# Patient Record
Sex: MALE | Race: WHITE | NOT HISPANIC OR LATINO | Employment: OTHER | URBAN - METROPOLITAN AREA
[De-identification: names, ages, dates, MRNs, and addresses within clinical notes are randomized per-mention and may not be internally consistent; named-entity substitution may affect disease eponyms.]

---

## 2017-01-01 ENCOUNTER — APPOINTMENT (INPATIENT)
Dept: RADIOLOGY | Facility: HOSPITAL | Age: 82
DRG: 673 | End: 2017-01-01
Payer: MEDICARE

## 2017-01-01 ENCOUNTER — ALLSCRIPTS OFFICE VISIT (OUTPATIENT)
Dept: OTHER | Facility: OTHER | Age: 82
End: 2017-01-01

## 2017-01-01 ENCOUNTER — APPOINTMENT (INPATIENT)
Dept: DIALYSIS | Facility: HOSPITAL | Age: 82
DRG: 673 | End: 2017-01-01
Attending: INTERNAL MEDICINE
Payer: MEDICARE

## 2017-01-01 ENCOUNTER — GENERIC CONVERSION - ENCOUNTER (OUTPATIENT)
Dept: OTHER | Facility: OTHER | Age: 82
End: 2017-01-01

## 2017-01-01 ENCOUNTER — LAB REQUISITION (OUTPATIENT)
Dept: LAB | Facility: HOSPITAL | Age: 82
End: 2017-01-01
Payer: MEDICARE

## 2017-01-01 ENCOUNTER — APPOINTMENT (INPATIENT)
Dept: RADIOLOGY | Facility: HOSPITAL | Age: 82
DRG: 673 | End: 2017-01-01
Attending: INTERNAL MEDICINE
Payer: MEDICARE

## 2017-01-01 ENCOUNTER — HOSPITAL ENCOUNTER (INPATIENT)
Facility: HOSPITAL | Age: 82
LOS: 13 days | Discharge: NURSING, REHAB OR LONG-TERM CARE | DRG: 673 | End: 2018-01-08
Attending: EMERGENCY MEDICINE | Admitting: INTERNAL MEDICINE
Payer: MEDICARE

## 2017-01-01 ENCOUNTER — LAB CONVERSION - ENCOUNTER (OUTPATIENT)
Dept: OTHER | Facility: OTHER | Age: 82
End: 2017-01-01

## 2017-01-01 ENCOUNTER — HOSPITAL ENCOUNTER (OUTPATIENT)
Dept: RADIOLOGY | Facility: MEDICAL CENTER | Age: 82
Discharge: HOME | End: 2017-01-31
Payer: MEDICARE

## 2017-01-01 ENCOUNTER — APPOINTMENT (INPATIENT)
Dept: DIALYSIS | Facility: HOSPITAL | Age: 82
DRG: 673 | End: 2017-01-01
Payer: MEDICARE

## 2017-01-01 DIAGNOSIS — E87.5 HYPERKALEMIA: ICD-10-CM

## 2017-01-01 DIAGNOSIS — N18.9 CHRONIC KIDNEY DISEASE: ICD-10-CM

## 2017-01-01 DIAGNOSIS — N18.4 CKD (CHRONIC KIDNEY DISEASE) STAGE 4, GFR 15-29 ML/MIN (HCC): ICD-10-CM

## 2017-01-01 DIAGNOSIS — M79.672 PAIN OF LEFT FOOT: ICD-10-CM

## 2017-01-01 DIAGNOSIS — L03.119 CELLULITIS OF EXTREMITY: ICD-10-CM

## 2017-01-01 DIAGNOSIS — L98.9 DISORDER OF SKIN OR SUBCUTANEOUS TISSUE: ICD-10-CM

## 2017-01-01 DIAGNOSIS — N17.9 ACUTE RENAL FAILURE (ARF) (HCC): ICD-10-CM

## 2017-01-01 DIAGNOSIS — N17.9 AKI (ACUTE KIDNEY INJURY) (HCC): ICD-10-CM

## 2017-01-01 DIAGNOSIS — T50.4X5A: Primary | ICD-10-CM

## 2017-01-01 DIAGNOSIS — N17.9 ACUTE KIDNEY INJURY SUPERIMPOSED ON CHRONIC KIDNEY DISEASE (HCC): ICD-10-CM

## 2017-01-01 DIAGNOSIS — N18.9 ACUTE KIDNEY INJURY SUPERIMPOSED ON CHRONIC KIDNEY DISEASE (HCC): ICD-10-CM

## 2017-01-01 LAB
ALBUMIN SERPL BCP-MCNC: 2.9 G/DL (ref 3.5–5)
ALBUMIN SERPL BCP-MCNC: 3 G/DL (ref 3.5–5)
ALBUMIN SERPL BCP-MCNC: 3.1 G/DL (ref 3.5–5)
ALBUMIN SERPL BCP-MCNC: 3.2 G/DL (ref 3.5–5)
ALBUMIN SERPL BCP-MCNC: 3.2 G/DL (ref 3.5–5)
ALBUMIN SERPL BCP-MCNC: 3.6 G/DL (ref 3.5–5)
ALBUMIN SERPL BCP-MCNC: 3.6 G/DL (ref 3.5–5)
ALP SERPL-CCNC: 112 U/L (ref 46–116)
ALP SERPL-CCNC: 68 U/L (ref 46–116)
ALP SERPL-CCNC: 89 U/L (ref 46–116)
ALP SERPL-CCNC: 91 U/L (ref 46–116)
ALP SERPL-CCNC: 91 U/L (ref 46–116)
ALT SERPL W P-5'-P-CCNC: 18 U/L (ref 12–78)
ALT SERPL W P-5'-P-CCNC: 352 U/L (ref 12–78)
ALT SERPL W P-5'-P-CCNC: 55 U/L (ref 12–78)
ALT SERPL W P-5'-P-CCNC: 56 U/L (ref 12–78)
ALT SERPL W P-5'-P-CCNC: 56 U/L (ref 12–78)
ANION GAP SERPL CALCULATED.3IONS-SCNC: 10 MMOL/L (ref 4–13)
ANION GAP SERPL CALCULATED.3IONS-SCNC: 12 MMOL/L (ref 4–13)
ANION GAP SERPL CALCULATED.3IONS-SCNC: 12 MMOL/L (ref 4–13)
ANION GAP SERPL CALCULATED.3IONS-SCNC: 13 MMOL/L (ref 4–13)
ANION GAP SERPL CALCULATED.3IONS-SCNC: 13 MMOL/L (ref 4–13)
ANION GAP SERPL CALCULATED.3IONS-SCNC: 15 MMOL/L (ref 4–13)
ANION GAP SERPL CALCULATED.3IONS-SCNC: 6 MMOL/L (ref 4–13)
ANION GAP SERPL CALCULATED.3IONS-SCNC: 9 MMOL/L (ref 4–13)
APTT PPP: 27 SECONDS (ref 23–35)
AST SERPL W P-5'-P-CCNC: 11 U/L (ref 5–45)
AST SERPL W P-5'-P-CCNC: 180 U/L (ref 5–45)
AST SERPL W P-5'-P-CCNC: 38 U/L (ref 5–45)
ATRIAL RATE: 60 BPM
BACTERIA UR QL AUTO: ABNORMAL /HPF
BASOPHILS # BLD AUTO: 0.03 THOUSANDS/ΜL (ref 0–0.1)
BASOPHILS # BLD AUTO: 0.04 THOUSANDS/ΜL (ref 0–0.1)
BASOPHILS # BLD AUTO: 0.9 %
BASOPHILS # BLD AUTO: 59 CELLS/UL (ref 0–200)
BASOPHILS NFR BLD AUTO: 0 % (ref 0–1)
BASOPHILS NFR BLD AUTO: 1 % (ref 0–1)
BILIRUB DIRECT SERPL-MCNC: 0.42 MG/DL (ref 0–0.2)
BILIRUB SERPL-MCNC: 0.31 MG/DL (ref 0.2–1)
BILIRUB SERPL-MCNC: 0.34 MG/DL (ref 0.2–1)
BILIRUB SERPL-MCNC: 0.36 MG/DL (ref 0.2–1)
BILIRUB SERPL-MCNC: 0.57 MG/DL (ref 0.2–1)
BILIRUB SERPL-MCNC: 0.82 MG/DL (ref 0.2–1)
BILIRUB UR QL STRIP: ABNORMAL
BUN SERPL-MCNC: 100 MG/DL (ref 5–25)
BUN SERPL-MCNC: 109 MG/DL (ref 5–25)
BUN SERPL-MCNC: 56 MG/DL (ref 7–25)
BUN SERPL-MCNC: 57 MG/DL (ref 7–25)
BUN SERPL-MCNC: 58 MG/DL (ref 7–25)
BUN SERPL-MCNC: 63 MG/DL (ref 5–25)
BUN SERPL-MCNC: 63 MG/DL (ref 7–25)
BUN SERPL-MCNC: 64 MG/DL (ref 5–25)
BUN SERPL-MCNC: 80 MG/DL (ref 5–25)
BUN SERPL-MCNC: 84 MG/DL (ref 5–25)
BUN SERPL-MCNC: 89 MG/DL (ref 5–25)
BUN SERPL-MCNC: 90 MG/DL (ref 5–25)
BUN/CREA RATIO (HISTORICAL): 19 (CALC) (ref 6–22)
BUN/CREA RATIO (HISTORICAL): 21 (CALC) (ref 6–22)
BUN/CREA RATIO (HISTORICAL): 22 (CALC) (ref 6–22)
BUN/CREA RATIO (HISTORICAL): 23 (CALC) (ref 6–22)
CALCIUM SERPL-MCNC: 8.1 MG/DL (ref 8.3–10.1)
CALCIUM SERPL-MCNC: 8.2 MG/DL (ref 8.3–10.1)
CALCIUM SERPL-MCNC: 8.3 MG/DL (ref 8.3–10.1)
CALCIUM SERPL-MCNC: 8.4 MG/DL (ref 8.3–10.1)
CALCIUM SERPL-MCNC: 8.4 MG/DL (ref 8.3–10.1)
CALCIUM SERPL-MCNC: 8.5 MG/DL (ref 8.3–10.1)
CALCIUM SERPL-MCNC: 8.9 MG/DL (ref 8.6–10.3)
CALCIUM SERPL-MCNC: 9.1 MG/DL (ref 8.6–10.3)
CALCIUM SERPL-MCNC: 9.1 MG/DL (ref 8.6–10.3)
CALCIUM SERPL-MCNC: 9.2 MG/DL (ref 8.3–10.1)
CALCIUM SERPL-MCNC: 9.3 MG/DL (ref 8.6–10.3)
CALCIUM SERPL-MCNC: 9.4 MG/DL (ref 8.3–10.1)
CHLORIDE SERPL-SCNC: 100 MMOL/L (ref 100–108)
CHLORIDE SERPL-SCNC: 100 MMOL/L (ref 100–108)
CHLORIDE SERPL-SCNC: 101 MMOL/L (ref 100–108)
CHLORIDE SERPL-SCNC: 101 MMOL/L (ref 100–108)
CHLORIDE SERPL-SCNC: 101 MMOL/L (ref 98–110)
CHLORIDE SERPL-SCNC: 102 MMOL/L (ref 100–108)
CHLORIDE SERPL-SCNC: 102 MMOL/L (ref 98–110)
CHLORIDE SERPL-SCNC: 102 MMOL/L (ref 98–110)
CHLORIDE SERPL-SCNC: 104 MMOL/L (ref 98–110)
CHLORIDE SERPL-SCNC: 99 MMOL/L (ref 100–108)
CK SERPL-CCNC: 103 U/L (ref 39–308)
CLARITY UR: CLEAR
CO2 SERPL-SCNC: 22 MMOL/L (ref 21–32)
CO2 SERPL-SCNC: 22 MMOL/L (ref 21–32)
CO2 SERPL-SCNC: 24 MMOL/L (ref 21–32)
CO2 SERPL-SCNC: 24 MMOL/L (ref 21–32)
CO2 SERPL-SCNC: 26 MMOL/L (ref 21–32)
CO2 SERPL-SCNC: 27 MMOL/L (ref 21–32)
CO2 SERPL-SCNC: 28 MMOL/L (ref 21–32)
CO2 SERPL-SCNC: 30 MMOL/L (ref 20–31)
CO2 SERPL-SCNC: 31 MMOL/L (ref 20–31)
CO2 SERPL-SCNC: 32 MMOL/L (ref 20–31)
CO2 SERPL-SCNC: 33 MMOL/L (ref 20–31)
CO2 SERPL-SCNC: 36 MMOL/L (ref 21–32)
COLOR UR: YELLOW
CREAT SERPL-MCNC: 2.49 MG/DL (ref 0.7–1.11)
CREAT SERPL-MCNC: 2.67 MG/DL (ref 0.6–1.3)
CREAT SERPL-MCNC: 2.8 MG/DL (ref 0.7–1.11)
CREAT SERPL-MCNC: 2.9 MG/DL (ref 0.7–1.11)
CREAT SERPL-MCNC: 2.96 MG/DL (ref 0.7–1.11)
CREAT SERPL-MCNC: 4.03 MG/DL (ref 0.6–1.3)
CREAT SERPL-MCNC: 4.37 MG/DL (ref 0.6–1.3)
CREAT SERPL-MCNC: 4.42 MG/DL (ref 0.6–1.3)
CREAT SERPL-MCNC: 4.43 MG/DL (ref 0.6–1.3)
CREAT SERPL-MCNC: 4.5 MG/DL (ref 0.6–1.3)
CREAT SERPL-MCNC: 4.7 MG/DL (ref 0.6–1.3)
CREAT SERPL-MCNC: 5.33 MG/DL (ref 0.6–1.3)
CREAT UR-MCNC: 287 MG/DL
DEPRECATED RDW RBC AUTO: 12.4 % (ref 11–15)
EGFR AFRICAN AMERICAN (HISTORICAL): 21 ML/MIN/1.73M2
EGFR AFRICAN AMERICAN (HISTORICAL): 21 ML/MIN/1.73M2
EGFR AFRICAN AMERICAN (HISTORICAL): 22 ML/MIN/1.73M2
EGFR AFRICAN AMERICAN (HISTORICAL): 26 ML/MIN/1.73M2
EGFR-AMERICAN CALC (HISTORICAL): 18 ML/MIN/1.73M2
EGFR-AMERICAN CALC (HISTORICAL): 18 ML/MIN/1.73M2
EGFR-AMERICAN CALC (HISTORICAL): 19 ML/MIN/1.73M2
EGFR-AMERICAN CALC (HISTORICAL): 22 ML/MIN/1.73M2
EOSINOPHIL # BLD AUTO: 0.06 THOUSAND/ΜL (ref 0–0.61)
EOSINOPHIL # BLD AUTO: 0.15 THOUSAND/ΜL (ref 0–0.61)
EOSINOPHIL # BLD AUTO: 1.2 %
EOSINOPHIL # BLD AUTO: 78 CELLS/UL (ref 15–500)
EOSINOPHIL NFR BLD AUTO: 1 % (ref 0–6)
EOSINOPHIL NFR BLD AUTO: 2 % (ref 0–6)
EOSINOPHIL NFR URNS MANUAL: 0 %
ERYTHROCYTE [DISTWIDTH] IN BLOOD BY AUTOMATED COUNT: 13.6 % (ref 11.6–15.1)
ERYTHROCYTE [DISTWIDTH] IN BLOOD BY AUTOMATED COUNT: 14.3 % (ref 11.6–15.1)
ERYTHROCYTE [DISTWIDTH] IN BLOOD BY AUTOMATED COUNT: 14.4 % (ref 11.6–15.1)
ERYTHROCYTE [DISTWIDTH] IN BLOOD BY AUTOMATED COUNT: 14.4 % (ref 11.6–15.1)
ERYTHROCYTE [DISTWIDTH] IN BLOOD BY AUTOMATED COUNT: 14.5 % (ref 11.6–15.1)
ERYTHROCYTE [DISTWIDTH] IN BLOOD BY AUTOMATED COUNT: 14.6 % (ref 11.6–15.1)
ERYTHROCYTE [DISTWIDTH] IN BLOOD BY AUTOMATED COUNT: 14.7 % (ref 11.6–15.1)
ERYTHROCYTE [SEDIMENTATION RATE] IN BLOOD: 11 MM/HOUR (ref 0–10)
FERRITIN SERPL-MCNC: 116 NG/ML (ref 8–388)
FOLATE SERPL-MCNC: >20 NG/ML (ref 3.1–17.5)
GFR SERPL CREATININE-BSD FRML MDRD: 10 ML/MIN/1.73SQ M
GFR SERPL CREATININE-BSD FRML MDRD: 11 ML/MIN/1.73SQ M
GFR SERPL CREATININE-BSD FRML MDRD: 12 ML/MIN/1.73SQ M
GFR SERPL CREATININE-BSD FRML MDRD: 22.8 ML/MIN/1.73SQ M
GFR SERPL CREATININE-BSD FRML MDRD: 9 ML/MIN/1.73SQ M
GLUCOSE (HISTORICAL): 113 MG/DL (ref 65–99)
GLUCOSE (HISTORICAL): 120 MG/DL (ref 65–99)
GLUCOSE (HISTORICAL): 125 MG/DL (ref 65–99)
GLUCOSE (HISTORICAL): 97 MG/DL (ref 65–99)
GLUCOSE SERPL-MCNC: 100 MG/DL (ref 65–140)
GLUCOSE SERPL-MCNC: 101 MG/DL (ref 65–140)
GLUCOSE SERPL-MCNC: 110 MG/DL (ref 65–140)
GLUCOSE SERPL-MCNC: 115 MG/DL (ref 65–140)
GLUCOSE SERPL-MCNC: 142 MG/DL (ref 65–140)
GLUCOSE SERPL-MCNC: 145 MG/DL (ref 65–140)
GLUCOSE SERPL-MCNC: 152 MG/DL (ref 65–140)
GLUCOSE SERPL-MCNC: 96 MG/DL (ref 65–140)
GLUCOSE UR STRIP-MCNC: NEGATIVE MG/DL
HBV CORE AB SER QL: NORMAL
HBV CORE IGM SER QL: NORMAL
HBV SURFACE AB SER-ACNC: <3.1 MIU/ML
HBV SURFACE AG SER QL: NORMAL
HCT VFR BLD AUTO: 31.7 % (ref 36.5–49.3)
HCT VFR BLD AUTO: 33.4 % (ref 36.5–49.3)
HCT VFR BLD AUTO: 34.3 % (ref 36.5–49.3)
HCT VFR BLD AUTO: 35.5 % (ref 36.5–49.3)
HCT VFR BLD AUTO: 35.7 % (ref 36.5–49.3)
HCT VFR BLD AUTO: 37.5 % (ref 36.5–49.3)
HCT VFR BLD AUTO: 37.8 % (ref 38.5–50)
HCT VFR BLD AUTO: 42.8 % (ref 36.5–49.3)
HCV AB SER QL: NORMAL
HEMOCCULT STL QL: POSITIVE
HGB BLD-MCNC: 10.1 G/DL (ref 12–17)
HGB BLD-MCNC: 10.6 G/DL (ref 12–17)
HGB BLD-MCNC: 10.9 G/DL (ref 12–17)
HGB BLD-MCNC: 11.1 G/DL (ref 12–17)
HGB BLD-MCNC: 11.5 G/DL (ref 12–17)
HGB BLD-MCNC: 12 G/DL (ref 12–17)
HGB BLD-MCNC: 12.5 G/DL (ref 13.2–17.1)
HGB BLD-MCNC: 13.5 G/DL (ref 12–17)
HGB UR QL STRIP.AUTO: NEGATIVE
INR PPP: 1.12 (ref 0.86–1.16)
IRON SATN MFR SERPL: 11 %
IRON SERPL-MCNC: 34 UG/DL (ref 65–175)
KETONES UR STRIP-MCNC: NEGATIVE MG/DL
LEUKOCYTE ESTERASE UR QL STRIP: NEGATIVE
LIPASE SERPL-CCNC: 74 U/L (ref 73–393)
LYMPHOCYTES # BLD AUTO: 0.94 THOUSANDS/ΜL (ref 0.6–4.47)
LYMPHOCYTES # BLD AUTO: 1.01 THOUSANDS/ΜL (ref 0.6–4.47)
LYMPHOCYTES # BLD AUTO: 12.7 %
LYMPHOCYTES # BLD AUTO: 826 CELLS/UL (ref 850–3900)
LYMPHOCYTES NFR BLD AUTO: 11 % (ref 14–44)
LYMPHOCYTES NFR BLD AUTO: 12 % (ref 14–44)
MAGNESIUM SERPL-MCNC: 2.9 MG/DL (ref 1.6–2.6)
MCH RBC QN AUTO: 30.4 PG (ref 26.8–34.3)
MCH RBC QN AUTO: 30.5 PG (ref 27–33)
MCH RBC QN AUTO: 30.7 PG (ref 26.8–34.3)
MCH RBC QN AUTO: 30.8 PG (ref 26.8–34.3)
MCH RBC QN AUTO: 31.1 PG (ref 26.8–34.3)
MCH RBC QN AUTO: 31.2 PG (ref 26.8–34.3)
MCHC RBC AUTO-ENTMCNC: 31.3 G/DL (ref 31.4–37.4)
MCHC RBC AUTO-ENTMCNC: 31.5 G/DL (ref 31.4–37.4)
MCHC RBC AUTO-ENTMCNC: 31.7 G/DL (ref 31.4–37.4)
MCHC RBC AUTO-ENTMCNC: 31.8 G/DL (ref 31.4–37.4)
MCHC RBC AUTO-ENTMCNC: 31.9 G/DL (ref 31.4–37.4)
MCHC RBC AUTO-ENTMCNC: 32 G/DL (ref 31.4–37.4)
MCHC RBC AUTO-ENTMCNC: 32.2 G/DL (ref 31.4–37.4)
MCHC RBC AUTO-ENTMCNC: 33.1 G/DL (ref 32–36)
MCV RBC AUTO: 92.2 FL (ref 80–100)
MCV RBC AUTO: 96 FL (ref 82–98)
MCV RBC AUTO: 96 FL (ref 82–98)
MCV RBC AUTO: 97 FL (ref 82–98)
MCV RBC AUTO: 98 FL (ref 82–98)
MCV RBC AUTO: 99 FL (ref 82–98)
MONOCYTES # BLD AUTO: 0.71 THOUSAND/ΜL (ref 0.17–1.22)
MONOCYTES # BLD AUTO: 0.99 THOUSAND/ΜL (ref 0.17–1.22)
MONOCYTES # BLD AUTO: 748 CELLS/UL (ref 200–950)
MONOCYTES (HISTORICAL): 11.5 %
MONOCYTES NFR BLD AUTO: 11 % (ref 4–12)
MONOCYTES NFR BLD AUTO: 9 % (ref 4–12)
NEUTROPHILS # BLD AUTO: 4791 CELLS/UL (ref 1500–7800)
NEUTROPHILS # BLD AUTO: 6.52 THOUSANDS/ΜL (ref 1.85–7.62)
NEUTROPHILS # BLD AUTO: 6.55 THOUSANDS/ΜL (ref 1.85–7.62)
NEUTROPHILS # BLD AUTO: 73.7 %
NEUTS SEG NFR BLD AUTO: 75 % (ref 43–75)
NEUTS SEG NFR BLD AUTO: 78 % (ref 43–75)
NITRITE UR QL STRIP: NEGATIVE
NON-SQ EPI CELLS URNS QL MICRO: ABNORMAL /HPF
NRBC BLD AUTO-RTO: 0 /100 WBCS
NRBC BLD AUTO-RTO: 0 /100 WBCS
P AXIS: -7 DEGREES
PH UR STRIP.AUTO: 5 [PH] (ref 4.5–8)
PHOSPHATE SERPL-MCNC: 5 MG/DL (ref 2.3–4.1)
PHOSPHATE SERPL-MCNC: 6.1 MG/DL (ref 2.3–4.1)
PLATELET # BLD AUTO: 137 THOUSANDS/UL (ref 149–390)
PLATELET # BLD AUTO: 166 THOUSANDS/UL (ref 149–390)
PLATELET # BLD AUTO: 172 THOUSANDS/UL (ref 149–390)
PLATELET # BLD AUTO: 172 THOUSANDS/UL (ref 149–390)
PLATELET # BLD AUTO: 179 THOUSANDS/UL (ref 149–390)
PLATELET # BLD AUTO: 194 THOUSAND/UL (ref 140–400)
PLATELET # BLD AUTO: 199 THOUSANDS/UL (ref 149–390)
PLATELET # BLD AUTO: 244 THOUSANDS/UL (ref 149–390)
PMV BLD AUTO: 11.1 FL (ref 8.9–12.7)
PMV BLD AUTO: 11.5 FL (ref 7.5–12.5)
PMV BLD AUTO: 11.6 FL (ref 8.9–12.7)
PMV BLD AUTO: 11.7 FL (ref 8.9–12.7)
PMV BLD AUTO: 11.7 FL (ref 8.9–12.7)
PMV BLD AUTO: 11.8 FL (ref 8.9–12.7)
PMV BLD AUTO: 12 FL (ref 8.9–12.7)
PMV BLD AUTO: 12.1 FL (ref 8.9–12.7)
POTASSIUM SERPL-SCNC: 4.3 MMOL/L (ref 3.5–5.3)
POTASSIUM SERPL-SCNC: 4.4 MMOL/L (ref 3.5–5.3)
POTASSIUM SERPL-SCNC: 4.7 MMOL/L (ref 3.5–5.3)
POTASSIUM SERPL-SCNC: 4.7 MMOL/L (ref 3.5–5.3)
POTASSIUM SERPL-SCNC: 4.8 MMOL/L (ref 3.5–5.3)
POTASSIUM SERPL-SCNC: 4.8 MMOL/L (ref 3.5–5.3)
POTASSIUM SERPL-SCNC: 4.9 MMOL/L (ref 3.5–5.3)
POTASSIUM SERPL-SCNC: 5 MMOL/L (ref 3.5–5.3)
POTASSIUM SERPL-SCNC: 5.2 MMOL/L (ref 3.5–5.3)
POTASSIUM SERPL-SCNC: 5.3 MMOL/L (ref 3.5–5.3)
POTASSIUM SERPL-SCNC: 5.4 MMOL/L (ref 3.5–5.3)
POTASSIUM SERPL-SCNC: 5.6 MMOL/L (ref 3.5–5.3)
PR INTERVAL: 204 MS
PROT SERPL-MCNC: 6.5 G/DL (ref 6.4–8.2)
PROT SERPL-MCNC: 6.6 G/DL (ref 6.4–8.2)
PROT SERPL-MCNC: 6.8 G/DL (ref 6.4–8.2)
PROT SERPL-MCNC: 7.6 G/DL (ref 6.4–8.2)
PROT SERPL-MCNC: 7.7 G/DL (ref 6.4–8.2)
PROT UR STRIP-MCNC: ABNORMAL MG/DL
PROT UR-MCNC: 34 MG/DL
PROT/CREAT UR: 0.12 MG/G{CREAT} (ref 0–0.1)
PROTHROMBIN TIME: 14.5 SECONDS (ref 12.1–14.4)
PTH-INTACT SERPL-MCNC: 453.5 PG/ML (ref 14–72)
QRS AXIS: 12 DEGREES
QRSD INTERVAL: 100 MS
QT INTERVAL: 462 MS
QTC INTERVAL: 462 MS
RBC # BLD AUTO: 3.32 MILLION/UL (ref 3.88–5.62)
RBC # BLD AUTO: 3.44 MILLION/UL (ref 3.88–5.62)
RBC # BLD AUTO: 3.54 MILLION/UL (ref 3.88–5.62)
RBC # BLD AUTO: 3.62 MILLION/UL (ref 3.88–5.62)
RBC # BLD AUTO: 3.73 MILLION/UL (ref 3.88–5.62)
RBC # BLD AUTO: 3.85 MILLION/UL (ref 3.88–5.62)
RBC # BLD AUTO: 4.1 MILLION/UL (ref 4.2–5.8)
RBC # BLD AUTO: 4.34 MILLION/UL (ref 3.88–5.62)
RBC #/AREA URNS AUTO: ABNORMAL /HPF
SODIUM SERPL-SCNC: 136 MMOL/L (ref 136–145)
SODIUM SERPL-SCNC: 137 MMOL/L (ref 136–145)
SODIUM SERPL-SCNC: 137 MMOL/L (ref 136–145)
SODIUM SERPL-SCNC: 138 MMOL/L (ref 136–145)
SODIUM SERPL-SCNC: 138 MMOL/L (ref 136–145)
SODIUM SERPL-SCNC: 139 MMOL/L (ref 136–145)
SODIUM SERPL-SCNC: 139 MMOL/L (ref 136–145)
SODIUM SERPL-SCNC: 142 MMOL/L (ref 136–145)
SODIUM SERPL-SCNC: 143 MMOL/L (ref 135–146)
SODIUM SERPL-SCNC: 144 MMOL/L (ref 135–146)
SP GR UR STRIP.AUTO: 1.02 (ref 1–1.03)
T WAVE AXIS: 228 DEGREES
TIBC SERPL-MCNC: 316 UG/DL (ref 250–450)
URATE SERPL-MCNC: 12.3 MG/DL (ref 4.2–8)
URATE SERPL-MCNC: 13.9 MG/DL (ref 4.2–8)
UROBILINOGEN UR QL STRIP.AUTO: 0.2 E.U./DL
VENTRICULAR RATE: 60 BPM
VIT B12 SERPL-MCNC: 706 PG/ML (ref 100–900)
WBC # BLD AUTO: 6.5 THOUSAND/UL (ref 3.8–10.8)
WBC # BLD AUTO: 8.36 THOUSAND/UL (ref 4.31–10.16)
WBC # BLD AUTO: 8.46 THOUSAND/UL (ref 4.31–10.16)
WBC # BLD AUTO: 8.48 THOUSAND/UL (ref 4.31–10.16)
WBC # BLD AUTO: 8.65 THOUSAND/UL (ref 4.31–10.16)
WBC # BLD AUTO: 8.77 THOUSAND/UL (ref 4.31–10.16)
WBC # BLD AUTO: 9.55 THOUSAND/UL (ref 4.31–10.16)
WBC # BLD AUTO: 9.94 THOUSAND/UL (ref 4.31–10.16)
WBC #/AREA URNS AUTO: ABNORMAL /HPF

## 2017-01-01 PROCEDURE — 87340 HEPATITIS B SURFACE AG IA: CPT | Performed by: INTERNAL MEDICINE

## 2017-01-01 PROCEDURE — 80069 RENAL FUNCTION PANEL: CPT | Performed by: PHYSICIAN ASSISTANT

## 2017-01-01 PROCEDURE — 83690 ASSAY OF LIPASE: CPT | Performed by: EMERGENCY MEDICINE

## 2017-01-01 PROCEDURE — 96361 HYDRATE IV INFUSION ADD-ON: CPT

## 2017-01-01 PROCEDURE — 82746 ASSAY OF FOLIC ACID SERUM: CPT | Performed by: INTERNAL MEDICINE

## 2017-01-01 PROCEDURE — 71020 HB CHEST X-RAY 2VW FRONTAL&LATL: CPT

## 2017-01-01 PROCEDURE — 77001 FLUOROGUIDE FOR VEIN DEVICE: CPT

## 2017-01-01 PROCEDURE — 71020 HB X-RAY EXAM CHEST 2 VIEWS: CPT

## 2017-01-01 PROCEDURE — 83735 ASSAY OF MAGNESIUM: CPT | Performed by: EMERGENCY MEDICINE

## 2017-01-01 PROCEDURE — 73630 X-RAY EXAM OF FOOT: CPT

## 2017-01-01 PROCEDURE — C1752 CATH,HEMODIALYSIS,SHORT-TERM: HCPCS

## 2017-01-01 PROCEDURE — 93005 ELECTROCARDIOGRAM TRACING: CPT

## 2017-01-01 PROCEDURE — 86803 HEPATITIS C AB TEST: CPT | Performed by: INTERNAL MEDICINE

## 2017-01-01 PROCEDURE — 82728 ASSAY OF FERRITIN: CPT | Performed by: PHYSICIAN ASSISTANT

## 2017-01-01 PROCEDURE — 85027 COMPLETE CBC AUTOMATED: CPT | Performed by: PHYSICIAN ASSISTANT

## 2017-01-01 PROCEDURE — 83970 ASSAY OF PARATHORMONE: CPT | Performed by: PHYSICIAN ASSISTANT

## 2017-01-01 PROCEDURE — 36415 COLL VENOUS BLD VENIPUNCTURE: CPT | Performed by: EMERGENCY MEDICINE

## 2017-01-01 PROCEDURE — 81001 URINALYSIS AUTO W/SCOPE: CPT

## 2017-01-01 PROCEDURE — 80048 BASIC METABOLIC PNL TOTAL CA: CPT | Performed by: PHYSICIAN ASSISTANT

## 2017-01-01 PROCEDURE — 85610 PROTHROMBIN TIME: CPT | Performed by: EMERGENCY MEDICINE

## 2017-01-01 PROCEDURE — 80048 BASIC METABOLIC PNL TOTAL CA: CPT | Performed by: INTERNAL MEDICINE

## 2017-01-01 PROCEDURE — 02HV33Z INSERTION OF INFUSION DEVICE INTO SUPERIOR VENA CAVA, PERCUTANEOUS APPROACH: ICD-10-PCS | Performed by: RADIOLOGY

## 2017-01-01 PROCEDURE — 84156 ASSAY OF PROTEIN URINE: CPT | Performed by: INTERNAL MEDICINE

## 2017-01-01 PROCEDURE — 5A1D70Z PERFORMANCE OF URINARY FILTRATION, INTERMITTENT, LESS THAN 6 HOURS PER DAY: ICD-10-PCS | Performed by: INTERNAL MEDICINE

## 2017-01-01 PROCEDURE — 80053 COMPREHEN METABOLIC PANEL: CPT | Performed by: EMERGENCY MEDICINE

## 2017-01-01 PROCEDURE — 36556 INSERT NON-TUNNEL CV CATH: CPT

## 2017-01-01 PROCEDURE — 80053 COMPREHEN METABOLIC PANEL: CPT | Performed by: PHYSICIAN ASSISTANT

## 2017-01-01 PROCEDURE — 85027 COMPLETE CBC AUTOMATED: CPT | Performed by: EMERGENCY MEDICINE

## 2017-01-01 PROCEDURE — 83540 ASSAY OF IRON: CPT | Performed by: INTERNAL MEDICINE

## 2017-01-01 PROCEDURE — 85652 RBC SED RATE AUTOMATED: CPT | Performed by: PHYSICIAN ASSISTANT

## 2017-01-01 PROCEDURE — 85730 THROMBOPLASTIN TIME PARTIAL: CPT | Performed by: EMERGENCY MEDICINE

## 2017-01-01 PROCEDURE — 82272 OCCULT BLD FECES 1-3 TESTS: CPT | Performed by: INTERNAL MEDICINE

## 2017-01-01 PROCEDURE — 99285 EMERGENCY DEPT VISIT HI MDM: CPT

## 2017-01-01 PROCEDURE — 86706 HEP B SURFACE ANTIBODY: CPT | Performed by: INTERNAL MEDICINE

## 2017-01-01 PROCEDURE — 83550 IRON BINDING TEST: CPT | Performed by: INTERNAL MEDICINE

## 2017-01-01 PROCEDURE — 93005 ELECTROCARDIOGRAM TRACING: CPT | Performed by: EMERGENCY MEDICINE

## 2017-01-01 PROCEDURE — 81002 URINALYSIS NONAUTO W/O SCOPE: CPT | Performed by: EMERGENCY MEDICINE

## 2017-01-01 PROCEDURE — 96374 THER/PROPH/DIAG INJ IV PUSH: CPT

## 2017-01-01 PROCEDURE — 80076 HEPATIC FUNCTION PANEL: CPT | Performed by: PHYSICIAN ASSISTANT

## 2017-01-01 PROCEDURE — 82570 ASSAY OF URINE CREATININE: CPT | Performed by: INTERNAL MEDICINE

## 2017-01-01 PROCEDURE — 85025 COMPLETE CBC W/AUTO DIFF WBC: CPT | Performed by: EMERGENCY MEDICINE

## 2017-01-01 PROCEDURE — 84550 ASSAY OF BLOOD/URIC ACID: CPT | Performed by: PHYSICIAN ASSISTANT

## 2017-01-01 PROCEDURE — 76937 US GUIDE VASCULAR ACCESS: CPT

## 2017-01-01 PROCEDURE — 84550 ASSAY OF BLOOD/URIC ACID: CPT | Performed by: INTERNAL MEDICINE

## 2017-01-01 PROCEDURE — 87205 SMEAR GRAM STAIN: CPT | Performed by: INTERNAL MEDICINE

## 2017-01-01 PROCEDURE — 82550 ASSAY OF CK (CPK): CPT | Performed by: EMERGENCY MEDICINE

## 2017-01-01 PROCEDURE — 82607 VITAMIN B-12: CPT | Performed by: INTERNAL MEDICINE

## 2017-01-01 PROCEDURE — 86705 HEP B CORE ANTIBODY IGM: CPT | Performed by: INTERNAL MEDICINE

## 2017-01-01 PROCEDURE — 85025 COMPLETE CBC W/AUTO DIFF WBC: CPT | Performed by: PHYSICIAN ASSISTANT

## 2017-01-01 PROCEDURE — 86704 HEP B CORE ANTIBODY TOTAL: CPT | Performed by: INTERNAL MEDICINE

## 2017-01-01 RX ORDER — HEPARIN SODIUM 5000 [USP'U]/ML
5000 INJECTION, SOLUTION INTRAVENOUS; SUBCUTANEOUS EVERY 8 HOURS SCHEDULED
Status: DISCONTINUED | OUTPATIENT
Start: 2017-01-01 | End: 2017-01-01

## 2017-01-01 RX ORDER — HEPARIN SODIUM 5000 [USP'U]/ML
5000 INJECTION, SOLUTION INTRAVENOUS; SUBCUTANEOUS EVERY 8 HOURS SCHEDULED
Status: DISCONTINUED | OUTPATIENT
Start: 2017-01-01 | End: 2018-01-01 | Stop reason: HOSPADM

## 2017-01-01 RX ORDER — ACETAMINOPHEN 160 MG/5ML
650 SUSPENSION, ORAL (FINAL DOSE FORM) ORAL EVERY 6 HOURS PRN
Status: DISCONTINUED | OUTPATIENT
Start: 2017-01-01 | End: 2018-01-01 | Stop reason: HOSPADM

## 2017-01-01 RX ORDER — SODIUM CHLORIDE 9 MG/ML
60 INJECTION, SOLUTION INTRAVENOUS CONTINUOUS
Status: DISCONTINUED | OUTPATIENT
Start: 2017-01-01 | End: 2017-01-01

## 2017-01-01 RX ORDER — ONDANSETRON 2 MG/ML
4 INJECTION INTRAMUSCULAR; INTRAVENOUS ONCE
Status: COMPLETED | OUTPATIENT
Start: 2017-01-01 | End: 2017-01-01

## 2017-01-01 RX ORDER — CARVEDILOL 3.12 MG/1
3.12 TABLET ORAL 2 TIMES DAILY WITH MEALS
Status: DISCONTINUED | OUTPATIENT
Start: 2017-01-01 | End: 2018-01-01

## 2017-01-01 RX ORDER — ONDANSETRON 2 MG/ML
4 INJECTION INTRAMUSCULAR; INTRAVENOUS EVERY 6 HOURS PRN
Status: DISCONTINUED | OUTPATIENT
Start: 2017-01-01 | End: 2018-01-01 | Stop reason: HOSPADM

## 2017-01-01 RX ORDER — ASPIRIN 81 MG/1
81 TABLET ORAL DAILY
Status: DISCONTINUED | OUTPATIENT
Start: 2017-01-01 | End: 2018-01-01 | Stop reason: HOSPADM

## 2017-01-01 RX ORDER — COLCHICINE 0.6 MG/1
0.6 TABLET ORAL EVERY OTHER DAY
COMMUNITY
End: 2018-01-01 | Stop reason: HOSPADM

## 2017-01-01 RX ORDER — METOCLOPRAMIDE HYDROCHLORIDE 5 MG/ML
10 INJECTION INTRAMUSCULAR; INTRAVENOUS EVERY 6 HOURS PRN
Status: DISCONTINUED | OUTPATIENT
Start: 2017-01-01 | End: 2017-01-01

## 2017-01-01 RX ORDER — PANTOPRAZOLE SODIUM 40 MG/1
40 TABLET, DELAYED RELEASE ORAL
Status: DISCONTINUED | OUTPATIENT
Start: 2017-01-01 | End: 2018-01-01 | Stop reason: HOSPADM

## 2017-01-01 RX ORDER — CALCITRIOL 0.25 UG/1
0.25 CAPSULE, LIQUID FILLED ORAL DAILY
Status: DISCONTINUED | OUTPATIENT
Start: 2017-01-01 | End: 2017-01-01

## 2017-01-01 RX ORDER — METOCLOPRAMIDE HYDROCHLORIDE 5 MG/ML
5 INJECTION INTRAMUSCULAR; INTRAVENOUS EVERY 8 HOURS PRN
Status: DISCONTINUED | OUTPATIENT
Start: 2017-01-01 | End: 2018-01-01 | Stop reason: HOSPADM

## 2017-01-01 RX ORDER — DEXTROSE AND SODIUM CHLORIDE 5; .45 G/100ML; G/100ML
100 INJECTION, SOLUTION INTRAVENOUS CONTINUOUS
Status: DISCONTINUED | OUTPATIENT
Start: 2017-01-01 | End: 2017-01-01

## 2017-01-01 RX ORDER — ACETAMINOPHEN 325 MG/1
650 TABLET ORAL EVERY 6 HOURS PRN
Status: DISCONTINUED | OUTPATIENT
Start: 2017-01-01 | End: 2018-01-01 | Stop reason: HOSPADM

## 2017-01-01 RX ORDER — SODIUM CHLORIDE 9 MG/ML
125 INJECTION, SOLUTION INTRAVENOUS CONTINUOUS
Status: DISCONTINUED | OUTPATIENT
Start: 2017-01-01 | End: 2017-01-01

## 2017-01-01 RX ORDER — SODIUM CHLORIDE, SODIUM LACTATE, POTASSIUM CHLORIDE, CALCIUM CHLORIDE 600; 310; 30; 20 MG/100ML; MG/100ML; MG/100ML; MG/100ML
100 INJECTION, SOLUTION INTRAVENOUS CONTINUOUS
Status: DISCONTINUED | OUTPATIENT
Start: 2017-01-01 | End: 2017-01-01

## 2017-01-01 RX ORDER — GUAIFENESIN 100 MG/5ML
200 SOLUTION ORAL EVERY 4 HOURS PRN
Status: DISCONTINUED | OUTPATIENT
Start: 2017-01-01 | End: 2018-01-01 | Stop reason: HOSPADM

## 2017-01-01 RX ORDER — IRON POLYSACCHARIDE COMPLEX 150 MG
150 CAPSULE ORAL DAILY
Status: DISCONTINUED | OUTPATIENT
Start: 2017-01-01 | End: 2017-01-01

## 2017-01-01 RX ORDER — LEVOTHYROXINE SODIUM 0.05 MG/1
50 TABLET ORAL
Status: DISCONTINUED | OUTPATIENT
Start: 2017-01-01 | End: 2018-01-01 | Stop reason: HOSPADM

## 2017-01-01 RX ORDER — FUROSEMIDE 10 MG/ML
60 INJECTION INTRAMUSCULAR; INTRAVENOUS
Status: DISCONTINUED | OUTPATIENT
Start: 2017-01-01 | End: 2018-01-01

## 2017-01-01 RX ADMIN — HEPARIN SODIUM 5000 UNITS: 5000 INJECTION, SOLUTION INTRAVENOUS; SUBCUTANEOUS at 06:05

## 2017-01-01 RX ADMIN — PANTOPRAZOLE SODIUM 40 MG: 40 TABLET, DELAYED RELEASE ORAL at 16:13

## 2017-01-01 RX ADMIN — FUROSEMIDE 60 MG: 10 INJECTION, SOLUTION INTRAMUSCULAR; INTRAVENOUS at 16:13

## 2017-01-01 RX ADMIN — FUROSEMIDE 60 MG: 10 INJECTION, SOLUTION INTRAMUSCULAR; INTRAVENOUS at 12:23

## 2017-01-01 RX ADMIN — ONDANSETRON 4 MG: 2 INJECTION INTRAMUSCULAR; INTRAVENOUS at 20:34

## 2017-01-01 RX ADMIN — ONDANSETRON 4 MG: 2 INJECTION INTRAMUSCULAR; INTRAVENOUS at 13:06

## 2017-01-01 RX ADMIN — ACETAMINOPHEN 650 MG: 160 SUSPENSION ORAL at 23:59

## 2017-01-01 RX ADMIN — PANTOPRAZOLE SODIUM 40 MG: 40 TABLET, DELAYED RELEASE ORAL at 06:14

## 2017-01-01 RX ADMIN — LEVOTHYROXINE SODIUM 50 MCG: 50 TABLET ORAL at 06:03

## 2017-01-01 RX ADMIN — ASPIRIN 81 MG: 81 TABLET, COATED ORAL at 09:14

## 2017-01-01 RX ADMIN — LEVOTHYROXINE SODIUM 50 MCG: 50 TABLET ORAL at 06:05

## 2017-01-01 RX ADMIN — HEPARIN SODIUM 5000 UNITS: 5000 INJECTION, SOLUTION INTRAVENOUS; SUBCUTANEOUS at 06:16

## 2017-01-01 RX ADMIN — HEPARIN SODIUM 5000 UNITS: 5000 INJECTION, SOLUTION INTRAVENOUS; SUBCUTANEOUS at 21:27

## 2017-01-01 RX ADMIN — ASPIRIN 81 MG: 81 TABLET, COATED ORAL at 08:38

## 2017-01-01 RX ADMIN — ACETAMINOPHEN 650 MG: 160 SUSPENSION ORAL at 21:43

## 2017-01-01 RX ADMIN — HEPARIN SODIUM 5000 UNITS: 5000 INJECTION, SOLUTION INTRAVENOUS; SUBCUTANEOUS at 05:59

## 2017-01-01 RX ADMIN — DEXTROSE AND SODIUM CHLORIDE 100 ML/HR: 5; .45 INJECTION, SOLUTION INTRAVENOUS at 17:36

## 2017-01-01 RX ADMIN — PANTOPRAZOLE SODIUM 40 MG: 40 TABLET, DELAYED RELEASE ORAL at 06:00

## 2017-01-01 RX ADMIN — ONDANSETRON 4 MG: 2 INJECTION INTRAMUSCULAR; INTRAVENOUS at 14:25

## 2017-01-01 RX ADMIN — HEPARIN SODIUM 5000 UNITS: 5000 INJECTION, SOLUTION INTRAVENOUS; SUBCUTANEOUS at 13:40

## 2017-01-01 RX ADMIN — HEPARIN SODIUM 5000 UNITS: 5000 INJECTION, SOLUTION INTRAVENOUS; SUBCUTANEOUS at 14:08

## 2017-01-01 RX ADMIN — LEVOTHYROXINE SODIUM 50 MCG: 50 TABLET ORAL at 06:04

## 2017-01-01 RX ADMIN — SODIUM CHLORIDE 125 ML/HR: 0.9 INJECTION, SOLUTION INTRAVENOUS at 16:17

## 2017-01-01 RX ADMIN — PANTOPRAZOLE SODIUM 40 MG: 40 TABLET, DELAYED RELEASE ORAL at 06:03

## 2017-01-01 RX ADMIN — GUAIFENESIN 200 MG: 100 SOLUTION ORAL at 12:50

## 2017-01-01 RX ADMIN — HEPARIN SODIUM 5000 UNITS: 5000 INJECTION, SOLUTION INTRAVENOUS; SUBCUTANEOUS at 22:49

## 2017-01-01 RX ADMIN — HEPARIN SODIUM 5000 UNITS: 5000 INJECTION, SOLUTION INTRAVENOUS; SUBCUTANEOUS at 21:42

## 2017-01-01 RX ADMIN — LEVOTHYROXINE SODIUM 50 MCG: 50 TABLET ORAL at 06:14

## 2017-01-01 RX ADMIN — ASPIRIN 81 MG: 81 TABLET, COATED ORAL at 13:24

## 2017-01-01 RX ADMIN — PANTOPRAZOLE SODIUM 40 MG: 40 TABLET, DELAYED RELEASE ORAL at 17:07

## 2017-01-01 RX ADMIN — ACETAMINOPHEN 650 MG: 160 SUSPENSION ORAL at 22:43

## 2017-01-01 RX ADMIN — HEPARIN SODIUM 5000 UNITS: 5000 INJECTION, SOLUTION INTRAVENOUS; SUBCUTANEOUS at 06:04

## 2017-01-01 RX ADMIN — HEPARIN SODIUM 5000 UNITS: 5000 INJECTION, SOLUTION INTRAVENOUS; SUBCUTANEOUS at 13:25

## 2017-01-01 RX ADMIN — LEVOTHYROXINE SODIUM 50 MCG: 50 TABLET ORAL at 05:59

## 2017-01-01 RX ADMIN — HEPARIN SODIUM 5000 UNITS: 5000 INJECTION, SOLUTION INTRAVENOUS; SUBCUTANEOUS at 14:18

## 2017-01-01 RX ADMIN — ONDANSETRON HYDROCHLORIDE 4 MG: 2 INJECTION, SOLUTION INTRAVENOUS at 15:38

## 2017-01-01 RX ADMIN — HEPARIN SODIUM 5000 UNITS: 5000 INJECTION, SOLUTION INTRAVENOUS; SUBCUTANEOUS at 14:53

## 2017-01-01 RX ADMIN — PANTOPRAZOLE SODIUM 40 MG: 40 TABLET, DELAYED RELEASE ORAL at 06:04

## 2017-01-01 RX ADMIN — ASPIRIN 81 MG: 81 TABLET, COATED ORAL at 12:32

## 2017-01-01 RX ADMIN — SODIUM CHLORIDE 500 ML: 0.9 INJECTION, SOLUTION INTRAVENOUS at 15:38

## 2017-01-01 RX ADMIN — ASPIRIN 81 MG: 81 TABLET, COATED ORAL at 12:17

## 2017-01-01 RX ADMIN — PANTOPRAZOLE SODIUM 40 MG: 40 TABLET, DELAYED RELEASE ORAL at 16:03

## 2017-01-01 RX ADMIN — CALCITRIOL 0.25 MCG: 0.25 CAPSULE ORAL at 08:38

## 2017-01-01 RX ADMIN — GUAIFENESIN 200 MG: 100 SOLUTION ORAL at 12:16

## 2017-01-01 RX ADMIN — SODIUM CHLORIDE 60 ML/HR: 0.9 INJECTION, SOLUTION INTRAVENOUS at 14:09

## 2017-01-01 RX ADMIN — PANTOPRAZOLE SODIUM 40 MG: 40 TABLET, DELAYED RELEASE ORAL at 06:05

## 2017-01-01 RX ADMIN — METOCLOPRAMIDE 5 MG: 5 INJECTION, SOLUTION INTRAMUSCULAR; INTRAVENOUS at 17:57

## 2017-01-01 RX ADMIN — Medication 150 MG: at 08:38

## 2017-01-01 RX ADMIN — ACETAMINOPHEN 650 MG: 325 TABLET, FILM COATED ORAL at 14:54

## 2017-01-01 RX ADMIN — PANTOPRAZOLE SODIUM 40 MG: 40 TABLET, DELAYED RELEASE ORAL at 16:05

## 2017-01-01 RX ADMIN — SODIUM CHLORIDE 60 ML/HR: 0.9 INJECTION, SOLUTION INTRAVENOUS at 06:11

## 2017-01-01 RX ADMIN — HEPARIN SODIUM 5000 UNITS: 5000 INJECTION, SOLUTION INTRAVENOUS; SUBCUTANEOUS at 21:09

## 2017-01-01 RX ADMIN — PANTOPRAZOLE SODIUM 40 MG: 40 TABLET, DELAYED RELEASE ORAL at 16:54

## 2017-01-12 ENCOUNTER — LAB REQUISITION (OUTPATIENT)
Dept: LAB | Facility: HOSPITAL | Age: 82
End: 2017-01-12
Payer: MEDICARE

## 2017-01-12 ENCOUNTER — ALLSCRIPTS OFFICE VISIT (OUTPATIENT)
Dept: OTHER | Facility: OTHER | Age: 82
End: 2017-01-12

## 2017-01-12 DIAGNOSIS — E07.9 DISORDER OF THYROID: ICD-10-CM

## 2017-01-12 PROCEDURE — 84443 ASSAY THYROID STIM HORMONE: CPT | Performed by: PHYSICIAN ASSISTANT

## 2017-01-13 ENCOUNTER — GENERIC CONVERSION - ENCOUNTER (OUTPATIENT)
Dept: OTHER | Facility: OTHER | Age: 82
End: 2017-01-13

## 2017-01-13 LAB — TSH SERPL DL<=0.05 MIU/L-ACNC: 1.83 UIU/ML (ref 0.36–3.74)

## 2017-01-24 ENCOUNTER — ALLSCRIPTS OFFICE VISIT (OUTPATIENT)
Dept: OTHER | Facility: OTHER | Age: 82
End: 2017-01-24

## 2017-11-02 NOTE — PROGRESS NOTES
Assessment  Assessed    1  AI (aortic insufficiency) (424 1) (I35 1)   2  Benign essential hypertension (401 1) (I10)   3  Combined systolic and diastolic heart failure (820 16) (I50 40)   4  PAF (paroxysmal atrial fibrillation) (427 31) (I48 0)   5  Severe aortic stenosis (424 1) (I35 0)    Plan  AI (aortic insufficiency), Combined systolic and diastolic heart failure, PAF (paroxysmal  atrial fibrillation), Severe aortic stenosis    · EKG/ECG- POC; Status:Complete;   Done: 81ZCM8681 02:04PM   Perform: In Office; Last Updated By:Irwin Logan; 11/1/2017 2:04:28 PM;Ordered; For:AI (aortic insufficiency), Combined systolic and diastolic heart failure, PAF (paroxysmal atrial fibrillation), Severe aortic stenosis; Ordered By:Darren Donato;  PAF (paroxysmal atrial fibrillation), Severe aortic stenosis    · Follow-up visit in 4 Months Evaluation and Treatment  Follow-up  Status: Hold For -  Scheduling  Requested for: 53HYC4949   Ordered; For: PAF (paroxysmal atrial fibrillation), Severe aortic stenosis; Ordered By: Shital Renteria Performed:  Due: 81LTY8357    Discussion/Summary  Cardiology Discussion Summary Free Text Note Form [address]:   #1  Chronic combined congestive heart failure: New [place] Heart Association class II-III  Stable, no progression of chronic stable exertional dyspnea  He is able to do his usual activities without much trouble, and exertional fatigue actually improved since PPM placement  Con't furosemide, coreg  No change since last visit in terms of functional capacity her symptoms  Cardiomyopathy, ejection fraction 30%: Suspect secondary to severe aortic stenosis  Cannot rule out coronary artery disease, no prior evaluation  Continue furosemide and Coreg  No ACE inhibitor/ARB secondary to significant chronic kidney disease  Pt continues to prefer conservative management which I think is reasonable due to multiple comorbidities  Severe aortic stenosis: Suspect patient suboptimal candidate for open heart surgery, and transcatheter aortic valve replacement in light of comorbidities including cardiomyopathy/chronic kidney disease/advanced age and frail appearance  Both pt and son continue to prefer conservative approach w/ medical management  Syncope secondary to sinus pause: 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  Continue interrogations as scheduled  Paroxysmal atrial fibrillation: Seen on device check from April 2017 with one atrial fibrillation episode lasting for hours  No recurrent atrial fibrillation seen on prior device check of June 2017  This was discussed with the patient in April 2017 and we had decided to hold off in light of history of hemorrhagic gastritis resulting in hospitalization February 2016, as well as multiple comorbidities and elevated bleeding risk   Will continue to monitor frequency of paroxysmal atrial fibrillation by device interrogations  Will continue readdressing in the future  in 4 months, pt to call if any changes  Chief Complaint  Chief Complaint Free Text Note Form: AS      History of Present Illness  Cardiology HPI Free Text Note Form [facility]: This is an 71-year-old male who is hospitalized at [facility] in December 2015 secondary to shortness of breath  He was noted to be in congestive heart failure with a large left pleural effusion  He underwent thoracentesis with a transmitted effusion noted  Echocardiogram had revealed moderate to severe aortic regurgitation  As per discharge summary, there was also a cardiomyopathy present, with unknown ejection fraction  He was discharged on furosemide  Thereafter, in February 2016, he had hemorrhagic gastritis and anemia  At that time he had poor oral intake, and had significant weight loss  Since then, his diet has progressively improved and he has gained much of his weight back    echocardiogram on 8/12/16 revealed an ejection fraction of 48%, grade 2 diastolic dysfunction, severe aortic valve stenosis, moderate aortic regurgitation  Thereafter, he had opted for conservative management, as he would likely be a poor candidate for open heart surgery, and a suboptimal candidate for transcatheter aortic valve replacement   early October 2016, he had 2 syncopal episodes while in the passenger seat of a car  He was admitted to the hospital, and was noted to have 2 separate 5-second pauses on telemetry on 10/8/16  Thereafter, he underwent placement of a dual chamber permanent pacemaker on 11/2/2016  pacemaker implantation he actually feels better, less tired with physical exertion  He has chronic stable exertional dyspnea, denies lower extremity edema, exertional chest discomfort, or any further episodes of presyncope or syncope  He walks outside when the weather is good, with a cane, and feels very well  His ISAAC has not been worsening  HE HAS NO NEW COMPLAINTS ON TODAY'S VISIT  Review of Systems  Cardiology Male ROS:     Cardiac: No complaints of chest pain, no palpitations, no fainiting  Skin: No complaints of nonhealing sores or skin rash  Genitourinary: No complaints of recurrent urinary tract infections, frequent urination at night, difficult urination, blood in urine, kidney stones, loss of bladder control, no kidney or prostate problems, no erectile dysfunction  Psychological: No complaints of feeling depressed, anxiety, panic attacks, or difficulty concentrating  General: trouble sleeping, but-- no appetite changes,-- no changes in weight,-- no lack of energy/fatigue,-- no fever,-- no night sweats-- and-- no frequent infections  Respiratory: No complaints of shortness of breath, cough with sputum, or wheezing  HEENT: No complaints of serious problems, hearing problems, nose problems, throat problems, or snoring  Gastrointestinal: No complaints of liver problems, nausea, vomiting, heartburn, constipation, bloody stools, diarrhea, problems swallowing, adbominal pain, or rectal bleeding     Hematologic: No complaints of bleeding disorders, anemia, blood clots, or excessive brusing  Neurological: No complaints of numbness, tingling, dizziness, weakness, seizures, headaches, syncope or fainting, AM fatigue, daytime sleepiness, no witnessed apnea episodes  Musculoskeletal: arthritis, but-- no back pain-- and-- no swelling/pain       ROS Reviewed:   ROS reviewed  Active Problems  Problems    1  Abnormal chest CT (793 2) (R93 8)   2  AI (aortic insufficiency) (424 1) (I35 1)   3  Benign essential hypertension (401 1) (I10)   4  Cellulitis of foot (682 7) (L03 119)   5  Chronic pneumothorax (512 83) (J93 81)   6  Chronic renal insufficiency (585 9) (N18 9)   7  Combined systolic and diastolic heart failure (316 57) (I50 40)   8  Impacted cerumen of both ears (380 4) (H61 23)   9  Ingrown toenail (703 0) (L60 0)   10  Iron deficiency anemia (280 9) (D50 9)   11  Left foot pain (729 5) (M79 672)   12  PAF (paroxysmal atrial fibrillation) (427 31) (I48 0)   13  Pleural effusion on left (511 9) (J90)   14  Secondary pulmonary hypertension (416 8)   15  Severe aortic stenosis (424 1) (I35 0)   16  Skin lesion of face (709 9) (L98 9)   17  Thyroid disorder (246 9) (E07 9)    Past Medical History  Problems    1  History of Acute gastritis with hemorrhage (535 01) (K29 01)   2  History of Fatigue (780 79) (R53 83)   3  History of congestive heart failure (V12 59) (Z86 79)   4  History of insomnia (V13 89) (Z87 898)   5  History of weakness (V13 89) (Z87 898)   6  History of Medicare annual wellness visit, initial (V70 0) (Z00 00)   7  History of Melena (578 1) (K92 1)   8  History of Moderate protein-calorie malnutrition (263 0) (E44 0)   9  History of Need for immunization against influenza (V04 81) (Z23)   10  History of Need for shingles vaccine (V04 89) (Z23)   11  History of Need for vaccination with 13-polyvalent pneumococcal conjugate vaccine    (V03 82) (Z23)   12  History of Nocturia (788 43) (R35 1)   13   History of Screening for depression (V79 0) (Z13 89)   14  History of Screening for genitourinary condition (V81 6) (Z13 89)   15  History of Screening for neurological condition (V80 09) (Z13 89)  Active Problems And Past Medical History Reviewed: The active problems and past medical history were reviewed and updated today  Surgical History  Problems    1  History of Thoracentesis (Diagnostic)   2  History of Thoracentesis (Therapeutic)  Surgical History Reviewed: The surgical history was reviewed and updated today  Family History  Mother    1  No pertinent family history  Father    2  No pertinent family history  Family History Reviewed: The family history was reviewed and updated today  Social History  Problems    · Denied: Alcohol use (V49 89) (Z78 9)   · Current every day smoker (305 1) (F17 200)   · History of Current Some Day Smoker (305 1)   · Daily caffeine consumption, 1 serving a day   · Denied: Drug use (305 90) (F19 90)  Social History Reviewed: The social history was reviewed and updated today  Current Meds   1  Aspirin 81 MG Oral Tablet Delayed Release; TAKE 1 TABLET DAILY AS DIRECTED; Therapy: 74WWL7463 to Recorded   2  Calcitriol 0 25 MCG Oral Capsule; TAKE ONE CAPSULE BY MOUTH ONCE DAILY; Therapy: 64BIA7711 to (Evaluate:31Mar2018)  Requested for: 94FIG4515; Last   Rx:49Kev9609 Ordered   3  Carvedilol 3 125 MG Oral Tablet; TAKE 1 TABLET TWICE DAILY  Requested for:   50FSU4552; Last Rx:09Vyr2595 Ordered   4  Ferrex 150 150 MG Oral Capsule; TAKE ONE CAPSULE BY MOUTH TWICE DAILY; Therapy: 41Ewr4580 to (Evaluate:50Acw6678)  Requested for: 86Isu5174; Last   Rx:13Evu0411 Ordered   5  Furosemide 20 MG Oral Tablet; TAKE 1 TABLET TWICE DAILY; Therapy: 43Xhk4197 to (Dearl Rohith)  Requested for: 04Itr7933; Last   Rx:30Tdm8409 Ordered   6  iFerex 150 150 MG Oral Capsule; Take 1 capsule twice daily; Therapy: 38QFV0120 to (Last Rx:40Rvn8177)  Requested for: 52Zww5152 Ordered   7   Levothyroxine Sodium 50 MCG Oral Tablet; TAKE ONE TABLET BY MOUTH ONCE DAILY; Therapy: 72PIQ0439 to (Evaluate:31Mar2018)  Requested for: 02Oct2017; Last   Rx:02Oct2017 Ordered   8  Pantoprazole Sodium 40 MG Oral Tablet Delayed Release; take one tablet by mouth   twice daily; Therapy: 44DGQ9452 to (Evaluate:82Fgs5152)  Requested for: 01Aug2017; Last   Rx:51Mqq6271 Ordered  Medication List Reviewed: The medication list was reviewed and updated today  Allergies  Medication    1  No Known Drug Allergies    Vitals  Vital Signs    Recorded: 85BAM3172 02:14PM   Heart Rate 60   Systolic 253   Diastolic 82   Weight 619 lb 2 oz   BMI Calculated 23 13   BSA Calculated 1 82     Physical Exam    Constitutional   General appearance: No acute distress, well appearing and well nourished  Eyes   Conjunctiva and Sclera examination: Conjunctiva pink, sclera anicteric  Neck   Neck and thyroid: Normal, supple, trachea midline, no thyromegaly  Pulmonary   Respiratory effort: No increased work of breathing or signs of respiratory distress  Auscultation of lungs: Abnormal  -- Mildly decreased breath sounds on left, mild bibasilar crackles  Cardiovascular   Auscultation of heart: Abnormal  -- 3/6 systolic ejection murmur, minimally audible S2, diastolic murmur heard at left and right upper sternal borders  Carotid pulses: Normal, 2+ bilaterally  Pedal pulses: Normal, 2+ bilaterally  Examination of extremities for edema and/or varicosities: Normal  -- no edema  Abdomen   Abdomen: Non-tender and no distention  Liver and spleen: No hepatomegaly or splenomegaly  Musculoskeletal Gait and station: Normal gait  -- Digits and nails: Abnormal -- Arthritis changes in both hands  -- Inspection/palpation of joints, bones, and muscles: Abnormal -- Arthritis changes in both hands  Skin - Skin and subcutaneous tissue: Abnormal -- Thin skin with no significant rash  Neurologic - Cranial nerves: II - XII intact  -- Speech: Normal     Psychiatric - Orientation to person, place, and time: Normal -- Mood and affect: Normal       Future Appointments    Date/Time Provider Specialty Site   03/13/2018 01:00 PM Cardiology, Device Clinic [facility] CARDIOLOGY  [place]   12/12/2017 01:00 PM Cardiology[address]   11/15/2017 03:00 PM GUSTAVO Blanco   Nephrology [facility]     Signatures   Electronically signed by : Jennifer Willett DO; Nov 1 2017  2:38PM EST                       (Author)

## 2017-11-16 NOTE — PROGRESS NOTES
Assessment  1  Chronic renal insufficiency (585 9) (N18 9)   2  Benign essential hypertension (401 1) (I10)    Plan  Chronic renal insufficiency    · (1) BASIC METABOLIC PROFILE; Status:Active; Requested for:02Apr2018; Perform:[facility]; JRU:10HET1753;IWDMFIC; For:Chronic renal insufficiency; Ordered By:Phillip Odom;   · (1) CBC/PLT/DIFF; Status:Active; Requested for:02Apr2018; Perform:[facility]; TVT:30KFE7557;OPBFKOC;SFSGI insufficiency; Ordered By:Phillip Odom;  Stable kidney function  No changes today  Follow up as scheduled  Discussion/Summary  The creatinine is stable in patient baseline range  He has no volume overloaded and is well compensated from a CHF standpoint  Continue current dose of diuretics  No changes  Reason For Visit  follow up chronic kidney disease      History of Present Illness  Here for routine follow up  No acute complaints  No changes in medications  Review of Systems   Constitutional: no fever,-- no chills-- and-- no anorexia  Gastrointestinal: no abdominal pain,-- no nausea,-- no diarrhea-- and-- no vomiting  Respiratory: no shortness of breath-- and-- no cough  Cardiovascular: no orthopnea,-- no chest pain-- and-- no lower extremity edema  Neurological: no headache,-- no lightheadedness-- and-- no dizziness  Genitourinary: nocturia-- and-- occasional incontinence ( stress ), but-- no dysuria  Current Meds   1  Aspirin 81 MG Oral Tablet Delayed Release; TAKE 1 TABLET DAILY AS DIRECTED; Therapy: 71WOH4155 to Recorded   2  Calcitriol 0 25 MCG Oral Capsule; TAKE ONE CAPSULE BY MOUTH ONCE DAILY; Therapy: 84ICQ0677 to (Evaluate:31Mar2018)  Requested for: 38KTK5399; Last Rx:02Oct2017 Ordered   3  Carvedilol 3 125 MG Oral Tablet; TAKE 1 TABLET TWICE DAILY  Requested for: 89BYQ8945; Last Rx:10Try1592 Ordered   4  Ferrex 150 150 MG Oral Capsule; TAKE ONE CAPSULE BY MOUTH TWICE DAILY;  Therapy: 86Hdt6096 to (Evaluate:07Fxv9726)  Requested for: 49QWZ9330; Last Rx:86Efk7298 Ordered   5  Furosemide 20 MG Oral Tablet; TAKE 1 TABLET TWICE DAILY; Therapy: 86Ylj5791 to (Marleni Guillermo)  Requested for: 05Gpk7171; Last Rx:39Dqz1513 Ordered   6  iFerex 150 150 MG Oral Capsule; Take 1 capsule twice daily; Therapy: 92TPS5610 to (Last Rx:93Sem7026)  Requested for: 66Hnm7498 Ordered   7  Levothyroxine Sodium 50 MCG Oral Tablet; TAKE ONE TABLET BY MOUTH ONCE DAILY; Therapy: 03NON8407 to (Evaluate:31Mar2018)  Requested for: 02Oct2017; Last Rx:05Dmb6936 Ordered   8  Pantoprazole Sodium 40 MG Oral Tablet Delayed Release; take one tablet by mouth twice daily; Therapy: 56FNQ5667 to (Evaluate:49Abs8321)  Requested for: 03Rvu4728; Last Rx:60Zsy3569 Ordered    The medication list was reviewed and updated today  Allergies  1  No Known Drug Allergies    Vitals  Vital Signs    Recorded: 48DYC6426 03:32PM Recorded: 97APY4812 03:10PM   Heart Rate 62    Respiration 16    Systolic 984    Diastolic 78    Height  5 ft 8 in   Weight  154 lb    BMI Calculated  23 42   BSA Calculated  1 83       Physical Exam   Constitutional: General appearance: No acute distress, well appearing and well nourished  ENT: External ears and nose appear normal     Eyes: Anicteric sclerae  JVD:  No JVD present  Pulmonary: Respiratory effort: No increased work of breathing or signs of respiratory distress  -- Auscultation of lungs: Clear to auscultation  Cardiovascular: 9-0/3 systolic murmur Regular  Abdomen: Non-tender, no masses  Extremities: No cyanosis, clubbing or edema    Neurologic: Non Focal     Psychiatric: Orientation to person, place, and time: Normal        Results/Data  (1) BASIC METABOLIC PROFILE 09HJB1556 10:57AM Maye Santiago     Test Name Result Flag Reference   GLUCOSE 120 mg/dL H 65-99     Fasting reference interval   For someone without known diabetes, a glucose value between 100 and 125 mg/dL is consistent with prediabetes and should be confirmed with a follow-up test  UREA NITROGEN (BUN) 63 mg/dL H 7-25   CREATININE 2 90 mg/dL H 0 70-1 11     For patients >52years of age, the reference limit for Creatinine is approximately 13% higher for people identified as -American  eGFR NON-AFR  AMERICAN 18 mL/min/1 73m2 L > OR = 60   eGFR AFRICAN AMERICAN 21 mL/min/1 73m2 L > OR = 60   BUN/CREATININE RATIO 22 (calc)  6-22   SODIUM 143 mmol/L  135-146   POTASSIUM 4 8 mmol/L  3 5-5 3   CHLORIDE 102 mmol/L     CARBON DIOXIDE 30 mmol/L  20-31   CALCIUM 9 1 mg/dL  8 6-10 3     (1) CBC/PLT/DIFF 12MHA5904 10:57AM Phillip Odom     REPORT COMMENT: FASTING:NO     Test Name Result Flag Reference   WHITE BLOOD CELL COUNT 6 5 Thousand/uL  3 8-10 8   RED BLOOD CELL COUNT 4 10 Million/uL L 4 20-5 80   HEMOGLOBIN 12 5 g/dL L 13 2-17 1   HEMATOCRIT 37 8 % L 38 5-50 0   MCV 92 2 fL  80 0-100 0   MCH 30 5 pg  27 0-33 0   MCHC 33 1 g/dL  32 0-36 0   RDW 12 4 %  11 0-15 0   PLATELET COUNT 373 Thousand/uL  140-400   ABSOLUTE NEUTROPHILS 4791 cells/uL  2248-0308   ABSOLUTE LYMPHOCYTES 826 cells/uL L 850-3900   ABSOLUTE MONOCYTES 748 cells/uL  200-950   ABSOLUTE EOSINOPHILS 78 cells/uL     ABSOLUTE BASOPHILS 59 cells/uL  0-200   NEUTROPHILS 73 7 %     LYMPHOCYTES 12 7 %     MONOCYTES 11 5 %     EOSINOPHILS 1 2 %     BASOPHILS 0 9 %     MPV 11 5 fL  7 5-12 5       Future Appointments    Date/Time Provider Specialty Site   03/13/2018 01:00 PM Cardiology, Device Clinic [facility] CARDIOLOGY  [place]   12/12/2017 01:00 PM Cardiology, Device Remote  [address]       Signatures   Electronically signed by :  GUSTAVO More ; Nov 15 2017  3:33PM EST                       (Author)

## 2017-12-26 NOTE — ED PROVIDER NOTES
History  Chief Complaint   Patient presents with    Vomiting     Patient reports having a few episodes of diarrhea and vomiting over last few days  Unable to keep down food and liquids  Denies abdominal pain, chest pain or SOB  Thinks its r/t his gout medication  Denies sick contacts  History provided by:  Patient and relative   used: No    Medical Problem - Major   Location:  Not feeling well, vomiting and diarrhea and anorexia  Severity:  Moderate  Onset quality:  Sudden  Duration: 2-3 days  Timing:  Constant  Progression:  Worsening  Chronicity:  New  Relieved by:  Nothing  Worsened by:  Nothing  Ineffective treatments:  None tried  Associated symptoms: diarrhea, nausea and vomiting    Associated symptoms: no abdominal pain, no chest pain, no congestion, no cough, no fever, no headaches, no rash, no shortness of breath and no sore throat     Patient was recently prescribed colchicine 0 6 mg to take every other day on the 21st and ever since he started taking this medicine is when he has been feeling bad  He has not taken any for the last several days  He does have underlying renal insufficiency as well as cardiac disease, pacemaker and cardiomyopathy  He also has aortic stenosis  Complains of a lot of generalized symptoms such as anorexia generalized weakness  He has had nausea and vomiting as well as some diarrhea  He states he has only been throwing up 1 time a day and had a loose stool 1 time a day for the last couple of days  There is no chest pain or shortness of breath  Prior to Admission Medications   Prescriptions Last Dose Informant Patient Reported?  Taking?   acetaminophen (TYLENOL) 325 mg tablet   No Yes   Sig: Take 2 tablets by mouth every 6 (six) hours as needed for mild pain   aspirin (ECOTRIN LOW STRENGTH) 81 mg EC tablet   Yes Yes   Sig: Take 81 mg by mouth daily   calcitriol (ROCALTROL) 0 25 mcg capsule   Yes Yes   Sig: Take 0 25 mcg by mouth daily carvedilol (COREG) 3 125 mg tablet   No Yes   Sig: Take 1 tablet by mouth 2 (two) times a day with meals for 30 days   colchicine (COLCRYS) 0 6 mg tablet   Yes Yes   Sig: Take 0 6 mg by mouth every other day   furosemide (LASIX) 20 mg tablet   Yes Yes   Sig: Take 20 mg by mouth 2 (two) times a day   iron polysaccharides (NIFEREX) 150 mg capsule   Yes Yes   Sig: Take 150 mg by mouth 2 (two) times a day   levothyroxine 50 mcg tablet   Yes Yes   Sig: Take 50 mcg by mouth daily   pantoprazole (PROTONIX) 40 mg tablet   Yes Yes   Sig: Take 40 mg by mouth 2 (two) times a day      Facility-Administered Medications: None       Past Medical History:   Diagnosis Date    Aortic stenosis     severe    Cardiomyopathy ([address] )     [facility]     CKD (chronic kidney disease) stage 3, GFR 30-59 ml/min     Combined systolic and diastolic heart failure (HCC)     GERD (gastroesophageal reflux disease)     Heart murmur     Hypertension     Hypothyroidism     hypothyroid    Pulmonary hypertension     Pulmonary hypertension, moderate to severe     Severe aortic stenosis     Sinus pause     Syncope        Past Surgical History:   Procedure Laterality Date    BACK SURGERY      BACK SURGERY      disc surgery    LUNG BIOPSY      pneumothorax       Family History   Problem Relation Age of Onset    Cancer Brother      I have reviewed and agree with the history as documented  Social History   Substance Use Topics    Smoking status: Former Smoker    Smokeless tobacco: Never Used    Alcohol use No        Review of Systems   Constitutional: Negative for chills and fever  HENT: Negative for congestion and sore throat  Respiratory: Negative for cough, chest tightness and shortness of breath  Cardiovascular: Negative for chest pain  Gastrointestinal: Positive for diarrhea, nausea and vomiting  Negative for abdominal pain  Genitourinary: Negative for dysuria and flank pain     Musculoskeletal: Negative for neck pain  Skin: Negative for rash  Neurological: Negative for weakness, numbness and headaches  All other systems reviewed and are negative  Physical Exam  ED Triage Vitals   Temperature Pulse Respirations Blood Pressure SpO2   12/26/17 1453 12/26/17 1447 12/26/17 1447 12/26/17 1447 12/26/17 1447   97 9 °F (36 6 °C) 69 18 124/69 96 %      Temp Source Heart Rate Source Patient Position - Orthostatic VS BP Location FiO2 (%)   12/26/17 1453 12/26/17 1447 12/26/17 1447 12/26/17 1447 --   Temporal Monitor Sitting Left arm       Pain Score       12/26/17 1447       No Pain           Orthostatic Vital Signs  Vitals:    12/26/17 1447 12/26/17 1639 12/26/17 1739   BP: 124/69 127/58 120/53   Pulse: 69 61 60   Patient Position - Orthostatic VS: Sitting Sitting        Physical Exam   Constitutional: He appears well-developed and well-nourished  He is cooperative  Non-toxic appearance  He does not have a sickly appearance  He does not appear ill  No distress  HENT:   Head: Normocephalic and atraumatic  Right Ear: Hearing normal  No drainage or swelling  Left Ear: Hearing normal  No drainage or swelling  Mouth/Throat: Uvula is midline and oropharynx is clear and moist  Mucous membranes are dry  Eyes: Conjunctivae and lids are normal  Right eye exhibits no discharge  Left eye exhibits no discharge  Neck: Trachea normal and normal range of motion  No JVD present  Cardiovascular: Normal rate, regular rhythm, intact distal pulses and normal pulses  Exam reveals no gallop and no friction rub  Murmur heard  Pulmonary/Chest: Effort normal and breath sounds normal  No stridor  No respiratory distress  He has no wheezes  He has no rales  Abdominal: Soft  Normal appearance  He exhibits no distension, no ascites and no mass  There is no hepatosplenomegaly  There is no tenderness  There is no rebound, no guarding and no CVA tenderness  Musculoskeletal: Normal range of motion   He exhibits no edema    Lymphadenopathy:     He has no cervical adenopathy  Right: No inguinal adenopathy present  Left: No inguinal adenopathy present  Neurological: He is alert  He has normal strength  He exhibits normal muscle tone  Gait normal  GCS eye subscore is 4  GCS verbal subscore is 5  GCS motor subscore is 6  Skin: Skin is warm, dry and intact  No rash noted  He is not diaphoretic  No pallor  Psychiatric: He has a normal mood and affect  His speech is normal  Cognition and memory are normal    Nursing note and vitals reviewed  ED Medications  Medications   dextrose 5 % and sodium chloride 0 45 % infusion (100 mL/hr Intravenous New Bag 12/26/17 1736)   sodium chloride 0 9 % bolus 500 mL (0 mL Intravenous Stopped 12/26/17 1615)   ondansetron (ZOFRAN) injection 4 mg (4 mg Intravenous Given 12/26/17 1538)       Diagnostic Studies  Results Reviewed     Procedure Component Value Units Date/Time    CK Total with Reflex CKMB [[id number]]  (Normal) Collected:  12/26/17 1457    Lab Status:  Final result Specimen:  Blood from Arm, Right Updated:  12/26/17 1738     Total  U/L     Protime-INR [[id number]]  (Abnormal) Collected:  12/26/17 1457    Lab Status:  Final result Specimen:  Blood from Arm, Right Updated:  12/26/17 1558     Protime 14 5 (H) seconds      INR 1 12    APTT [[id number]]  (Normal) Collected:  12/26/17 1457    Lab Status:  Final result Specimen:  Blood from Arm, Right Updated:  12/26/17 1558     PTT 27 seconds     Narrative:          Therapeutic Heparin Range = 60-90 seconds    Comprehensive metabolic panel [[id number]]  (Abnormal) Collected:  12/26/17 1457    Lab Status:  Final result Specimen:  Blood from Arm, Right Updated:  12/26/17 1544     Sodium 138 mmol/L      Potassium 5 4 (H) mmol/L      Chloride 101 mmol/L      CO2 28 mmol/L      Anion Gap 9 mmol/L      BUN 84 (H) mg/dL      Creatinine 4 03 (H) mg/dL      Glucose 145 (H) mg/dL      Calcium 9 4 mg/dL      AST 38 U/L      ALT 55 U/L Alkaline Phosphatase 91 U/L      Total Protein 7 7 g/dL      Albumin 3 6 g/dL      Total Bilirubin 0 57 mg/dL      eGFR 12 ml/min/1 73sq m     Narrative:         National Kidney Disease Education Program recommendations are as follows:  GFR calculation is accurate only with a steady state creatinine  Chronic Kidney disease less than 60 ml/min/1 73 sq  meters  Kidney failure less than 15 ml/min/1 73 sq  meters      Lipase [[id number]]  (Normal) Collected:  12/26/17 1457    Lab Status:  Final result Specimen:  Blood from Arm, Right Updated:  12/26/17 1544     Lipase 74 u/L     Magnesium [[id number]]  (Abnormal) Collected:  12/26/17 1457    Lab Status:  Final result Specimen:  Blood from Arm, Right Updated:  12/26/17 1544     Magnesium 2 9 (H) mg/dL     CBC and differential [[id number]]  (Abnormal) Collected:  12/26/17 1457    Lab Status:  Final result Specimen:  Blood from Arm, Right Updated:  12/26/17 1528     WBC 8 36 Thousand/uL      RBC 3 85 (L) Million/uL      Hemoglobin 12 0 g/dL      Hematocrit 37 5 %      MCV 97 fL      MCH 31 2 pg      MCHC 32 0 g/dL      RDW 14 3 %      MPV 12 1 fL      Platelets 769 Thousands/uL      nRBC 0 /100 WBCs      Neutrophils Relative 78 (H) %      Lymphocytes Relative 12 (L) %      Monocytes Relative 9 %      Eosinophils Relative 1 %      Basophils Relative 0 %      Neutrophils Absolute 6 55 Thousands/µL      Lymphocytes Absolute 1 01 Thousands/µL      Monocytes Absolute 0 71 Thousand/µL      Eosinophils Absolute 0 06 Thousand/µL      Basophils Absolute 0 03 Thousands/µL     POCT urinalysis dipstick [[id number]]     Lab Status:  No result Specimen:  Urine                  No orders to display              Procedures  ECG 12 Lead Documentation  Date/Time: 12/26/2017 5:00 PM  Performed by: Junaid Cortez by: Amadou Mendoza     Indications / Diagnosis:  Renal failure and hyperkalemia  ECG reviewed by me, the ED Provider: yes    Patient location:  ED  Previous ECG:     Previous ECG:  Compared to current    Similarity:  Changes noted  Interpretation:     Interpretation: non-specific    Rate:     ECG rate assessment: normal    Rhythm:     Rhythm: paced    Pacing:     Type of pacing:  Atrial  Ectopy:     Ectopy: none    QRS:     QRS axis:  Left  ST segments:     ST segments:  Non-specific  T waves:     T waves: inverted      Inverted:  II and V6  Other findings:     Other findings: LVH    CriticalCare Time  Performed by: Lizett Blanc by: Josseline Brown     Critical care provider statement:     Critical care time (minutes):  35    Critical care time was exclusive of:  Separately billable procedures and treating other patients and teaching time    Critical care was necessary to treat or prevent imminent or life-threatening deterioration of the following conditions:  Toxidrome and renal failure    Critical care was time spent personally by me on the following activities:  Development of treatment plan with patient or surrogate, obtaining history from patient or surrogate, discussions with consultants, discussions with primary provider, evaluation of patient's response to treatment, examination of patient, review of old charts, re-evaluation of patient's condition and ordering and review of laboratory studies    I assumed direction of critical care for this patient from another provider in my specialty: no             Phone Contacts  ED Phone Contact    ED Course  ED Course                                MDM  Number of Diagnoses or Management Options  Acute renal failure (ARF) ([facility] [address] ):   Colchicine adverse reaction:   Diagnosis management comments: There is concerned that the patient has an adverse reaction to the colchicine which is cause renal failure acutely given his nonspecific symptoms as well  Also could be dehydrated so I started some IV fluids on him    I did discuss the case with the  who agrees with the current treatment plan of stopping the colchicine  and continuing some IV fluid  Case was discussed with nephrology and they recommend D5 half normal saline as hydration fluid an are not concerned about the potassium level at its present level  They wanted to stop all of his antihypertensives and diuretics  A fluid bolus was ordered and given to the patient as well as he started on maintenance fluid  Admitted to Internal Medicine  Amount and/or Complexity of Data Reviewed  Clinical lab tests: reviewed and ordered  Tests in the medicine section of CPT®: ordered and reviewed  Obtain history from someone other than the patient: yes  Discuss the patient with other providers: yes    Patient Progress  Patient progress: stable    The patient presented with a condition in which there was a high probability of imminent or life-threatening deterioration, and critical care services (excluding separately billable procedures) totalled 30-74 minutes  Disposition  Final diagnoses:   Colchicine adverse reaction   Acute renal failure (ARF) ([facility] [address] )     Time reflects when diagnosis was documented in both MDM as applicable and the Disposition within this note     Time User Action Codes Description Comment    12/26/2017  4:33 PM Mee Degroot [T50 [id number]] Colchicine adverse reaction     12/26/2017  5:14 PM Mee Degroot [N17 9] Acute renal failure (ARF) [facility])       ED Disposition     ED Disposition Condition Comment    Admit  Case was discussed with NP and the patient's admission status was agreed to be Admission Status: inpatient status to the service of Dr Alec Oconnor     Follow-up Information    None       Patient's Medications   Discharge Prescriptions    No medications on file     No discharge procedures on file      ED Provider  Electronically Signed by           Elsa Crystal MD  12/26/17 0621

## 2017-12-27 PROBLEM — N17.9 AKI (ACUTE KIDNEY INJURY) (HCC): Status: ACTIVE | Noted: 2017-01-01

## 2017-12-27 PROBLEM — E87.5 HYPERKALEMIA: Status: ACTIVE | Noted: 2017-01-01

## 2017-12-27 NOTE — PLAN OF CARE

## 2017-12-27 NOTE — CONSULTS
Consultation - Medical Toxicology  Lidia Robison 80 y o  male MRN: [id number]  Unit/Bed#: [place] [address] 223-02 Encounter: [id number]      Reason for Consult / Principal Problem: chronic colchicine toxicity   Inpatient consult to Toxicology  Consult performed by: Osvaldo Haas ordered by: Keny Dominguez        12/27/17  1500PM    ASSESSMENT:   80-year-old male with chronic colchicine toxicity  1  Chronic colchicine toxicity  2  Acute on chronic renal insufficiency       RECOMMENDATIONS:     Please continue to provide supportive care with intravenous fluids and p o  diet to tolerance  The patient's presentation is consistent with chronic colchicine toxicity  Colchicine should be discontinued and he should not go back on colchicine at any point  Also, please hold any diuretics at this time unless discussed with nephrology as volume status should be closely monitored  Patient may be cleared from toxicological standpoint once renal function has improved and he has no gastrointestinal symptoms for 12 hours and laboratory markers have normalized, in particular renal function, white blood cells, and platelets  There is no specific antidote for colchicine  Should GI symptoms continue, please keep patient admitted and monitor for leukopenia, thrombocytopenia, and elevated CK  Please draw baseline CK at this time and check CBC and BMP Q6-8 hours initially  If stable, please then check CBC Q12 hours monitoring for leukopenia and thrombocytopenia as markers of bone marrow suppression  Please also monitor for worsening renal insufficiency and consider dialysis should patient be unable to urinate or meet other acute indications as colchicine is not removed by dialysis  His worsening renal dysfunction is likely associated with and precipitated by colchicine use  I defer care in this matter to nephrology  Should acidosis occur with his GI symptoms consider checking iron concentration   Once patient is discharged, he should have follow-up CBC and CMP at days 2-3 and 7-10 post discharge to reevaluate for delayed bone marrow suppression, transaminitis and renal dysfunction         Colchicine, often prescribed for gout among other indications, can be deadly in acute overdose  In chronic toxicity can occur with therapeutic use, and predisposition can be secondary to preexisiting renal or hepatic dysfunction  In acute toxicity, three phases generally occur: Phase 1 (0-24 hours; nausea, vomiting, bloody diarrhea, volume depletion, leukocytosis), Phase 2 (1-7 days; risk of sudden cardiac death, dysrhythmias, confusion, seizure, coma, pancytopenia, renal failure, hepatic failure, acute lung injury, rhabdomyolysis, death), Phase 3 (over 7 days; recovery or death, rebound leukocytosis, myopathy, neuropathy, alopecia, respiratory or cardiac failure)  Elevated white blood cell count greater than 18,000 and elevated INR within 24 hours of ingestion are poor prognostic indicators after an acute overdose  Mechanism of action for colchicine is inhibition of microtubular formation and function, arresting cell division during mitosis  Colchicine is rapidly absorbed intracellularly and is not amenable to enhanced elimination  It is eliminated by the liver with a half-life of 4-31 hours  Colchicine toxicity should be suspected in chronic setting when accompanied by early symptoms of gastrointestinal distress including nausea, vomiting, diarrhea and abdominal pain  The syndrome may progress with potential for severe bloody diarrhea, shock, depressed cardiac contractility, and possibly delirium  The factors that contribute to precipitation of chronic colchicine poisoning include renal insufficiency, liver disease, and other drug interactions that may inhibit colchicine clearance  In this patient's setting, he has pre-existing renal insufficiency that likely contributed to colchicine toxicity   The diagnosis is clinical  Colchicine levels are not readily attainable and there is no specific antidote  Emergent complications may include supportive treatment of sudden respiratory depression, cardiac arrest, shock, rhabdomyolysis, and bone marrow suppression  Later complications include bone marrow suppression and patient should be monitored for leukopenia and thrombocytopenia which may occur as late as 4-5 days out  Alopecia may occur in 2-3 weeks  Myopathy and polyneuropathy may also result from chronic toxicity  Treatment is supportive  Dialysis is ineffective  Colchicine undergoes enterohepatic recirculation and multidose activated charcoal may assist in elimination, but is not a proven treatment  Should patient becomes severely leukopenic, please treat with granulocyte colony-stimulating factor  Please discuss this with toxicology if considered         For further questions, please call [facility]  Service or Patient [address] to reach the medical  on call  Available on [employer] Text when on call  Please see additional teaching note below (if available)      HPI: Zeferino Neri is a 80y o  year old male who presents with nausea, vomiting and diarrhea that occurred for about 2 days, while taking colchicine as an outpatient for his gout  He reports taking 0 6mg every other day for a total of four doses  After second dose, he began to feel nauseated  By third dose he was worsening with diarrhea  Then after vomiting on day of fourth dose he discontinued therapy and sought medical care  He was found to have worsened renal failure with creatinine elevated form baseline and was admitted to hospital  I was initially consulted by phone and performed bedside consultation next day  Patient now has been closely followed by nephrology and his renal function seems to have stabilized  He has urinated twice today and reports no further vomiting or diarrhea in 24 hours but still has nausea  He is tolerating a normal diet       Review of Systems Constitutional: Negative  HENT: Negative  Respiratory: Negative  Cardiovascular: Negative  Gastrointestinal: Positive for nausea  Negative for blood in stool, diarrhea and vomiting  Genitourinary: Positive for decreased urine volume  Musculoskeletal: Negative  Skin: Negative  Neurological: Negative  Hematological: Negative  Psychiatric/Behavioral: Negative  All other systems reviewed and are negative  Historical Information   Past Medical History:   Diagnosis Date    [facility] 12/27/2017    Aortic stenosis     severe    [facility]omy[facility] )     [facility]     CKD (chronic kidney disease) stage 3, GFR 30-59 ml/min     Combined systolic and diastolic heart failure ([facility])     GERD (gastroesophageal reflux disease)     Heart murmur     Hyperkalemia 12/27/2017    Hypertension     Hypothyroidism     hypothyroid    Pulmonary hypertension     Pulmonary hypertension, moderate to severe     Severe aortic stenosis     Sinus pause     Syncope      Past Surgical History:   Procedure Laterality Date    BACK SURGERY      BACK SURGERY      disc surgery    LUNG BIOPSY      pneumothorax     Social History   History   Alcohol Use No     History   Drug Use No     History   Smoking Status    Former Smoker   Smokeless Tobacco    Never Used     Family History   Problem Relation Age of Onset    Cancer Brother         Prior to Admission medications    Medication Sig Start Date End Date Taking?  Authorizing Provider   acetaminophen (TYLENOL) 325 mg tablet Take 2 tablets by mouth every 6 (six) hours as needed for mild pain 11/3/16  Yes Darren Donato, DO   aspirin (ECOTRIN LOW STRENGTH) 81 mg EC tablet Take 81 mg by mouth daily   Yes Historical Provider, MD   calcitriol (ROCALTROL) 0 25 mcg capsule Take 0 25 mcg by mouth daily   Yes Historical Provider, MD   carvedilol (COREG) 3 125 mg tablet Take 1 tablet by mouth 2 (two) times a day with meals for 30 days 11/3/16 12/26/17 Yes Rujul Donato, DO   colchicine (COLCRYS) 0 6 mg tablet Take 0 6 mg by mouth every other day   Yes Historical Provider, MD   furosemide (LASIX) 20 mg tablet Take 20 mg by mouth 2 (two) times a day   Yes Historical Provider, MD   iron polysaccharides (NIFEREX) 150 mg capsule Take 150 mg by mouth 2 (two) times a day   Yes Historical Provider, MD   levothyroxine 50 mcg tablet Take 50 mcg by mouth daily   Yes Historical Provider, MD   pantoprazole (PROTONIX) 40 mg tablet Take 40 mg by mouth 2 (two) times a day   Yes Historical Provider, MD       Current Facility-Administered Medications   Medication Dose Route Frequency    acetaminophen (TYLENOL) oral suspension 650 mg  650 mg Oral Q6H PRN    acetaminophen (TYLENOL) tablet 650 mg  650 mg Oral Q6H PRN    aspirin (ECOTRIN LOW STRENGTH) EC tablet 81 mg  81 mg Oral Daily    heparin (porcine) subcutaneous injection 5,000 Units  5,000 Units Subcutaneous Q8H [address]    heparin (porcine) subcutaneous injection 5,000 Units  5,000 Units Subcutaneous Q8H [address]    levothyroxine tablet 50 mcg  50 mcg Oral Early Morning    ondansetron (ZOFRAN) injection 4 mg  4 mg Intravenous Q6H PRN    ondansetron (ZOFRAN) injection 4 mg  4 mg Intravenous Q6H PRN    pantoprazole (PROTONIX) EC tablet 40 mg  40 mg Oral BID AC    sodium chloride 0 9 % infusion  60 mL/hr Intravenous Continuous       No Known Allergies    Objective       Intake/Output Summary (Last 24 hours) at 12/27/17 1403  Last data filed at 12/26/17 1640   Gross per 24 hour   Intake              600 ml   Output                0 ml   Net              600 ml       Invasive Devices:   Peripheral IV 12/26/17 Right Antecubital (Active)   Site Assessment Clean;Dry; Intact 12/27/2017  8:00 AM   Dressing Type Transparent 12/27/2017  8:00 AM   Line Status Infusing 12/27/2017  8:00 AM   Dressing Status Clean;Dry; Intact 12/27/2017  8:00 AM       Vitals   Vitals:    12/26/17 1739 12/26/17 1812 12/27/17 0016 12/27/17 0718   BP: 120/53 121/56 118/57 107/54   TempSrc:  Tympanic Tympanic Tympanic   Pulse: 60 60 60 61   Resp: 18 17 17 18   Patient Position - Orthostatic VS:  Lying Lying Lying   Temp:  97 5 °F (36 4 °C) 97 8 °F (36 6 °C) (!) 97 4 °F (36 3 °C)       Physical Exam   Constitutional: He is oriented to person, place, and time  HENT:   Head: Normocephalic  Eyes: Conjunctivae and EOM are normal  Pupils are equal, round, and reactive to light  Neck: Normal range of motion  Cardiovascular: Normal rate, regular rhythm, normal heart sounds and intact distal pulses  Pulmonary/Chest: Effort normal and breath sounds normal    Abdominal: Soft  Bowel sounds are normal  He exhibits no distension  There is no tenderness  Neurological: He is alert and oriented to person, place, and time  Skin: Skin is warm and dry  Psychiatric: He has a normal mood and affect  His behavior is normal  Judgment and thought content normal    Nursing note and vitals reviewed  Lab Results: I have personally reviewed pertinent reports  Labs:    Results from last 7 days  Lab Units 12/27/17  0554  12/26/17  1457   WBC Thousand/uL 8 48  < > 8 36   HEMOGLOBIN g/dL 10 6*  < > 12 0   HEMATOCRIT % 33 4*  < > 37 5   PLATELETS Thousands/uL 172  < > 199   NEUTROS PCT %  --   --  78*   LYMPHS PCT %  --   --  12*   MONOS PCT %  --   --  9   < > = values in this interval not displayed  Results from last 7 days  Lab Units 12/27/17  0555  12/26/17  1457   SODIUM mmol/L 139  < > 138   POTASSIUM mmol/L 4 9  < > 5 4*   CHLORIDE mmol/L 102  < > 101   CO2 mmol/L 24  < > 28   BUN mg/dL 90*  < > 84*   CREATININE mg/dL 4 37*  < > 4 03*   CALCIUM mg/dL 8 3  < > 9 4   TOTAL PROTEIN g/dL 6 5  < > 7 7   BILIRUBIN TOTAL mg/dL 0 31  < > 0 57   ALK PHOS U/L 89  < > 91   ALT U/L 56  < > 55   AST U/L 38  < > 38   GLUCOSE RANDOM mg/dL 142*  < > 145*   MAGNESIUM mg/dL  --   --  2 9*   < > = values in this interval not displayed       Results from last 7 days  Lab Units 12/26/17  1457   INR  1 12   PTT seconds 27                         Counseling / Coordination of Care  Total floor / unit time spent today 62 minutes  Greater than 50% of total time was spent with the patient and / or family counseling and / or coordination of care

## 2017-12-27 NOTE — SOCIAL WORK
CM met with patient to discuss d/c plan  Patient's wife, Tam Tenorio was also present  Patient lives in a home with his wife and son  He is independent with ADLs, uses a cane, and has a hx of VNA through [facility]  Patient does nto drive, and wife or son will transport home at time of d/c  Patient has help in the home and rx coverage, uses [facility] in [facility]  PCP is Dr Lauri Sadler  No d/c needs expressed or identified at this time  CM to follow as needed

## 2017-12-27 NOTE — CASE MANAGEMENT
Initial Clinical Review    Admission: Date/Time/Statement: 12/26/17 @ 1715     Orders Placed This Encounter   Procedures    Inpatient Admission (expected length of stay for this patient is greater than two midnights)     Standing Status:   Standing     Number of Occurrences:   1     Order Specific Question:   Admitting Physician     Answer:   Derrick Stanley [[id number]]     Order Specific Question:   Level of Care     Answer:   Med Surg [[id number]]     Order Specific Question:   Estimated length of stay     Answer:   More than 2 Midnights     Order Specific Question:   Certification     Answer:   I certify that inpatient services are medically necessary for this patient for a duration of greater than two midnights  See H&P and MD Progress Notes for additional information about the patient's course of treatment  ED: Date/Time/Mode of Arrival:   ED Arrival Information     Expected Arrival Acuity Means of Arrival Escorted By Service Admission Type    - 12/26/2017 14:43 Urgent Ambulance [facility] Urgent    Arrival Complaint    ABDOMINAL PAIN          Chief Complaint:   Chief Complaint   Patient presents with    Vomiting     Patient reports having a few episodes of diarrhea and vomiting over last few days  Unable to keep down food and liquids  Denies abdominal pain, chest pain or SOB  Thinks its r/t his gout medication  Denies sick contacts  History of Illness: Becca Valdovinos is a 80 y o  male with nausea, vomiting and diarrhea that started last evening  It began out of the blue  He did recently start colchicine  He has had decreased oral intake because of decreased appetite  He reports not seeing blood in either the vomit or his diarrhea but did see some red specks that he attributed to cranberry sauce  He has no dysuria  He has no chest pain, shortness of breath    He has no abdominal pain        ED Vital Signs:   ED Triage Vitals   Temperature Pulse Respirations Blood Pressure SpO2 12/26/17 1453 12/26/17 1447 12/26/17 1447 12/26/17 1447 12/26/17 1447   97 9 °F (36 6 °C) 69 18 124/69 96 %      Temp Source Heart Rate Source Patient Position - Orthostatic VS BP Location FiO2 (%)   12/26/17 1453 12/26/17 1447 12/26/17 1447 12/26/17 1447 --   Temporal Monitor Sitting Left arm       Pain Score       12/26/17 1447       No Pain        Wt Readings from Last 1 Encounters:   12/26/17 68 9 kg (152 lb)       Vital Signs (abnormal): wnl     Abnormal Labs/Diagnostic Test Results: pt inr  14 5  1 12, k   5 4, BUN creat   84  4 03, gluc 145, mg   2 9  EKG- atrial paced     ED Treatment:   Medication Administration from 12/26/2017 1443 to 12/26/2017 1803       Date/Time Order Dose Route Action Action by Comments     12/26/2017 1615 sodium chloride 0 9 % bolus 500 mL 0 mL Intravenous Stopped Wilda Degroot RN      12/26/2017 1538 sodium chloride 0 9 % bolus 500 mL 500 mL Intravenous New Arcenio Lan RN      12/26/2017 1640 sodium chloride 0 9 % infusion 0 mL/hr Intravenous Stopped Wilda Degroot RN      12/26/2017 1617 sodium chloride 0 9 % infusion 125 mL/hr Intravenous [facility] Vj Mancini RN      12/26/2017 1538 ondansetron (ZOFRAN) injection 4 mg 4 mg Intravenous Given Lorie Lan RN      12/26/2017 1736 dextrose 5 % and sodium chloride 0 45 % infusion 100 mL/hr Intravenous New Bag Lorie Lan RN           Past Medical/Surgical History:    Active Ambulatory Problems     Diagnosis Date Noted    CKD (chronic kidney disease) stage 4, GFR 15-29 ml/min ([facility])     Hypertension     Severe aortic stenosis     [facility]     Hypothyroidism     Pulmonary hypertension, moderate to severe     Syncope and collapse 10/07/2016    Pneumothorax 10/07/2016    GERD (gastroesophageal reflux disease)     Sinus bradycardia 10/07/2016    Sinus pause      Resolved Ambulatory Problems     Diagnosis Date Noted    No Resolved Ambulatory Problems Past Medical History:   Diagnosis Date    KEO (acute kidney injury) ([facility] ) 12/27/2017    Aortic stenosis     Cardiomyopathy ([place] [address] )     [facility]     CKD (chronic kidney disease) stage 3, GFR 30-59 ml/min     Combined systolic and diastolic heart failure ([facility])     GERD (gastroesophageal reflux disease)     Heart murmur     Hyperkalemia 12/27/2017    Hypertension     Hypothyroidism     Pulmonary hypertension     Pulmonary hypertension, moderate to severe     Severe aortic stenosis     Sinus pause     Syncope        Admitting Diagnosis: Abdominal pain [R10 9]  Acute renal failure (ARF) ([facility]) [N17 9]  Colchicine adverse reaction [T50 4X5A]    Age/Sex: 80 y o  male    Assessment/Plan: Assessment:     KEO (acute kidney injury) ([place] [address] )     Plan:     KEO (acute kidney injury) ([place] [address] )  · Creatinine today:  4 03; creatinine 1/2017:  2 67  · Underlying chronic renal insufficiency  · Will hydrate with D5 0 5% normal saline  · Possible reaction to recent initiation of colchicine  · Will consult Nephrology  · CK: 103     Hyperkalemia  · Will recheck in the morning     Benign essential hypertension  · Will hold antihypertensives and diuretics at this time as recommendation of Nephrology     Combined systolic and diastolic congestive heart failure  · Pre hospital, patient was on furosemide, carvedilol which will be held     Severe aortic stenosis     Paroxysmal atrial fibrillation  · Pacemaker implantation November 2016 with scheduled interrogations as per Cardiology     Cardiomyopathy  · Ejection fraction of 30%  · Echocardiogram performed August 12, 2016     History of syncope secondary to sinus pause  · Pacemaker     History of left-sided pneumothorax  · Stable, chronic       Admission Orders:  Scheduled Meds:   aspirin 81 mg Oral Daily   calcitriol 0 25 mcg Oral Daily   heparin (porcine) 5,000 Units Subcutaneous Q8H [facility]   heparin (porcine) 5,000 Units Subcutaneous Q8H [facility]   iron polysaccharides 150 mg Oral Daily   levothyroxine 50 mcg Oral Early Morning   pantoprazole 40 mg Oral BID AC     Continuous Infusions:   dextrose 5 % and sodium chloride 0 45 % 100 mL/hr     PRN Meds:   acetaminophen    acetaminophen    ondansetron      NPO   Act as apolinar   BRP   Up and OOB as apolinar   Nephrology consult   Toxicology consult   12/27  Cmp, cbc   BUN creat   89  4 42, gluc  152, H&H   10 9  34 3    Toxicology consult   ASSESSMENT:   61-year-old male with chronic colchicine toxicity  1  Chronic colchicine toxicity  2  Acute on chronic renal insufficiency  3  Positive troponin     RECOMMENDATIONS:     Please continue to provide supportive care with intravenous fluids and p o  diet to tolerance  Colchicine should be discontinued and he should not go back on colchicine at any point  Patient may be cleared from toxicological standpoint once renal function has improved and he has no gastrointestinal symptoms for 12 hours and laboratory markers have normalized, in particular renal function, white blood cells, and platelets  There is no specific antidote for colchicine  Should GI symptoms continue, please keep patient admitted and monitor for leukopenia, thrombocytopenia, and elevated CK  Please draw baseline CK at this time and check CBC and BMP Q6-8 hours  Please also monitor for worsening renal insufficiency and consider dialysis should patient be unable to urinate or meet other acute indications as colchicine is not removed by dialysis  Should acidosis occur with his GI symptoms consider checking iron concentration       The patient's presentation is consistent with chronic colchicine toxicity  Mechanism of action for colchicine is inhibition of microtubular formation and function, arresting cell division during mitosis  Colchicine is rapidly absorbed intracellularly and is not amenable to enhanced elimination  It is eliminated by the liver with a half-life of 4-31 hours   Colchicine toxicity should be suspected in chronic setting when accompanied by early symptoms of gastrointestinal distress including nausea, vomiting, diarrhea and abdominal pain  The syndrome may progress with potential for severe bloody diarrhea, shock, depressed cardiac contractility, and possibly delirium  The factors that contribute to precipitation of chronic colchicine poisoning include renal insufficiency, liver disease, and other drug interactions that may inhibit colchicine clearance  In this patient's setting, he has pre-existing renal insufficiency that likely contributed to colchicine toxicity  The diagnosis is clinical  Colchicine levels are not readily attainable and there is no specific antidote  Emergent complications may include supportive treatment of sudden respiratory depression, cardiac arrest, shock, rhabdomyolysis, and bone marrow suppression  Later complications include bone marrow suppression and patient should be monitored for leukopenia and thrombocytopenia which may occur as late as 4-5 days out  Alopecia may occur in 2-3 weeks  Myopathy and polyneuropathy may also result from chronic toxicity  Treatment is supportive  Dialysis is ineffective  Colchicine undergoes enterohepatic recirculation and multidose activated charcoal may assist in elimination, but is not a proven treatment  Should patient becomes severely leukopenic, please treat with granulocyte colony-stimulating factor  Please discuss this with toxicology if considered

## 2017-12-27 NOTE — CONSULTS
Consultation - Nephrology   Vlad Koroma 80 y o  male MRN: [id number]  Unit/Bed#: [facility] 2 [address] 223-02 Encounter: [id number]      ASSESSMENT and PLAN:    1  Acute kidney injury  -etiology has been thought to be related to a chronic colchicine toxicity per Toxicology note  -patient's creatinine has remained elevated around 4 3 and his BUN is around 90 fortunately is not increasing  -he states his urine output has been minimal  -urinalysis does show trace proteinuria, will check a urine protein to creatinine ratio  -check a bladder scan  -strict intakes and outputs and daily weights  -check a urine eosinophil, 2-4 WBCs on urinalysis  -decrease IV fluids, he is currently on D5 half normal saline-decrease rate to 75 cc an hour  -currently NPO potentially related to his colchicine toxicity, if cleared would encourage oral intake increase solute intake can help increased blood pressure  -hold all BP meds to avoid further hypotension  -stop all nonessential medicines    2  Stage 4 chronic kidney disease with a baseline creatinine of around 2 9 and a BUN of around 6  -baseline creatinine is around 2 9  -hopeful that renal replacement therapy will not be needed initiated  -continue to monitor until numbers improve currently asymptomatic    3  Gout  -check a uric acid, uric acid nephropathy could be contributing  -will need higher doses of prednisone for gout flares    4  Anemia of chronic kidney disease  -currently on oral iron supplementation as an outpatient  -check iron studies, B12, folate, stool occult  -hemoglobins currently stable around 10 MCV around 90    5  Electrolyte  -electrolytes are currently stable potassium was slightly elevated at 5 4 this is improved now to 4 9  -avoid Kayexalate for increased risk of bowel necrosis    6   Acid-base status  -currently acceptable no need for bicarb supplementation    7  Bone mineral disease  -will check PTH, vitamin-D, monitor calcium as an outpatient    8    Combined systolic and diastolic congestive heart failure  -avoid fluid overload, will check daily weights, if more short of breath okay to give a a dose of Lasix 40 mg IV x1  -will change to normal saline at 60 cc an hour for now  -if cleared by Toxicology okay for oral intake      HISTORY OF PRESENT ILLNESS:  Requesting Physician: Sergei Hopkins MD  Reason for Consult:  Acute kidney injury    Perry Hubbard is a 80y o  year old male who was admitted to [facility] after presenting with abnormal labs nausea vomiting diarrhea  A renal consultation is requested today for assistance in the management of acute kidney injury  He presented yesterday evening with increased nausea vomiting and diarrhea that started a day prior  Patient had a gout flare-up 1 was originally treated with prednisone however this was not continued because of concern for side effects so colchicine was initiated for every other day  Patient continued to have gout but also subsequently had diarrhea along with nausea and vomiting that increased  He came into the ER yesterday evening and was found to have a creatinine that had gone up to 4 3 with his baseline at 2 9 he had a significant azotemia as well around   He is feeling better today with the nausea and vomiting improving his diarrhea also has improved as well  He has been kept NPO overnight as well  His renal function has remained stable although he states his urine output has been minimal   He has a significant history of cardiomyopathy with valvular disease and follows up with Cardiology regularly    Currently is not complaining of any shortness of breath he denies any fevers or chills no blood in his urine no foamy urine no increased swelling    PAST MEDICAL HISTORY:  Past Medical History:   Diagnosis Date    [facility] 12[facility]27/2017    Aortic stenosis     severe    Cardiomyopathy ([facility] )     CHF (congestive heart failure) (HCC)     CKD (chronic kidney disease) stage 3, GFR 30-59 ml/min     Combined systolic and diastolic heart failure (HCC)     GERD (gastroesophageal reflux disease)     Heart murmur     Hyperkalemia 12/27/2017    Hypertension     Hypothyroidism     hypothyroid    Pulmonary hypertension     Pulmonary hypertension, moderate to severe     Severe aortic stenosis     Sinus pause     Syncope        PAST SURGICAL HISTORY:  Past Surgical History:   Procedure Laterality Date    BACK SURGERY      BACK SURGERY      disc surgery    LUNG BIOPSY      pneumothorax       ALLERGIES:  No Known Allergies    SOCIAL HISTORY:  History   Alcohol Use No     History   Drug Use No     History   Smoking Status    Former Smoker   Smokeless Tobacco    Never Used       FAMILY HISTORY:  Family History   Problem Relation Age of Onset    Cancer Brother        MEDICATIONS:    Current Facility-Administered Medications:     acetaminophen (TYLENOL) oral suspension 650 mg, 650 mg, Oral, Q6H PRN, Angeles Ally, PA-C, 650 mg at 12/26/17 5919    acetaminophen (TYLENOL) tablet 650 mg, 650 mg, Oral, Q6H PRN, Angeles Ally, PA-C    aspirin (ECOTRIN LOW STRENGTH) EC tablet 81 mg, 81 mg, Oral, Daily, Homero Mercer, PA-C, 81 mg at 12/27/17 1827    calcitriol (ROCALTROL) capsule 0 25 mcg, 0 25 mcg, Oral, Daily, Angeles Ally, PA-C, 0 25 mcg at 12/27/17 4481    dextrose 5 % and sodium chloride 0 45 % infusion, 100 mL/hr, Intravenous, Continuous, Angeles Ally, PA-C    heparin (porcine) subcutaneous injection 5,000 Units, 5,000 Units, Subcutaneous, Q8H [address] **AND** Platelet count, , , Once, Angeles Ally, PA-C    heparin (porcine) subcutaneous injection 5,000 Units, 5,000 Units, Subcutaneous, Q8H [address], 5,000 Units at 12/27/17 0604 **AND** Platelet count, , , Once, Angeles Ally, PA-C    iron polysaccharides (NIFEREX) capsule 150 mg, 150 mg, Oral, Daily, Angeles Ally, PA-C, 150 mg at 12/27/17 6008    levothyroxine tablet 50 mcg, 50 mcg, Oral, Early Morning, Augustine Dutta BETTY Mercer, 50 mcg at 12/27/17 0604    ondansetron (ZOFRAN) injection 4 mg, 4 mg, Intravenous, Q6H PRN, Yumiko Rousseau PA-C    ondansetron [facility]) injection 4 mg, 4 mg, Intravenous, Q6H PRN, Yumiko Rousseau PA-C    pantoprazole (PROTONIX) EC tablet 40 mg, 40 mg, Oral, BID AC, Yumiko Rousseau PA-C, 40 mg at 12/27/17 6516    REVIEW OF SYSTEMS:  All the systems were reviewed and were negative except as documented on the HPI        PHYSICAL EXAM:  Current Weight: Weight - Scale: 68 9 kg (152 lb)  First Weight: Weight - Scale: 68 9 kg (152 lb)  Vitals:    12/26/17 1739 12/26/17 1812 12/27/17 0016 12/27/17 0718   BP: 120/53 121/56 118/57 107/54   Pulse: 60 60 60 61   Resp: 18 17 17 18   Temp:  97 5 °F (36 4 °C) 97 8 °F (36 6 °C) (!) 97 4 °F (36 3 °C)   TempSrc:  Tympanic Tympanic Tympanic   SpO2: 97% 98% 97% 94%   Weight: 68 9 kg (152 lb)      Height: 5' 5" (1 651 m)          Intake/Output Summary (Last 24 hours) at 12/27/17 1224  Last data filed at 12/26/17 1640   Gross per 24 hour   Intake              600 ml   Output                0 ml   Net              600 ml     General: conscious, cooperative, in not acute distress  Eyes: conjunctivae pink, anicteric sclerae  ENT: lips and mucous membranes moist  Neck: supple, no JVD  Chest: clear breath sounds bilateral, no crackles, ronchus or wheezings  CVS:  Positive murmur normal rate, regular rhythm  Abdomen: soft, non-tender, non-distended, normoactive bowel sounds  Extremities: no edema of both legs  Skin: no rash  Neuro: awake, alert, oriented     Lab Results:     Results from last 7 days  Lab Units 12/27/17  0555 12/27/17  0554 12/27/17  0159 12/26/17  1812 12/26/17  1457   WBC Thousand/uL  --  8 48 9 55  --  8 36   HEMOGLOBIN g/dL  --  10 6* 10 9*  --  12 0   HEMATOCRIT %  --  33 4* 34 3*  --  37 5   PLATELETS Thousands/uL  --  172 172  --  199   SODIUM mmol/L 139  --  139  --  138   POTASSIUM mmol/L 4 9  --  5 3  --  5 4*   CHLORIDE mmol/L 102  --  102  --  101 CO2 mmol/L 24  --  27  --  28   BUN mg/dL 90*  --  89*  --  84*   CREATININE mg/dL 4 37*  --  4 42*  --  4 03*   CALCIUM mg/dL 8 3  --  8 4  --  9 4   MAGNESIUM mg/dL  --   --   --   --  2 9*   ALBUMIN g/dL 3 0*  --  3 1*  --  3 6   TOTAL PROTEIN g/dL 6 5  --  6 6  --  7 7   BILIRUBIN TOTAL mg/dL 0 31  --  0 34  --  0 57   ALK PHOS U/L 89  --  91  --  91   ALT U/L 56  --  56  --  55   AST U/L 38  --  38  --  38   GLUCOSE RANDOM mg/dL 142*  --  152*  --  145*   PROTEIN UA mg/dl  --   --   --  Trace*  --    NITRITE UA   --   --   --  Negative  --    BLOOD UA   --   --   --  Negative  --    LEUKOCYTES UA   --   --   --  Negative  --

## 2017-12-27 NOTE — PLAN OF CARE
DISCHARGE PLANNING     Discharge to home or other facility with appropriate resources Progressing        Knowledge Deficit     Patient/family/caregiver demonstrates understanding of disease process, treatment plan, medications, and discharge instructions Progressing        Potential for Falls     Patient will remain free of falls Progressing

## 2017-12-27 NOTE — H&P
History and Physical - [facility] Internal Medicine    Patient Information: Rafa Paulson 80 y o  male MRN: [id number]  [facility] [address] [facility] 223-02 Encounter: [id number]  Admitting Physician: Jesse Grimaldo PA-C  PCP: Pamela Dill DO  Date of Admission:  12/27/17      Assessment:    KEO (acute kidney injury) ([facility] )    Plan:    KEO (acute kidney injury) ([facility] )  · Creatinine today:  4 03; creatinine 1/2017:  2 67  · Underlying chronic renal insufficiency  · Will hydrate with D5 0 5% normal saline  · Possible reaction to recent initiation of colchicine  · Will consult Nephrology  · CK: 103    Hyperkalemia  · Will recheck in the morning    Benign essential hypertension  · Will hold antihypertensives and diuretics at this time as recommendation of Nephrology    Combined systolic and diastolic congestive heart failure  · Pre hospital, patient was on furosemide, carvedilol which will be held    Severe aortic stenosis    Paroxysmal atrial fibrillation  · Pacemaker implantation November 2016 with scheduled interrogations as per Cardiology    Cardiomyopathy  · Ejection fraction of 30%  · Echocardiogram performed August 12, 2016    History of syncope secondary to sinus pause  · Pacemaker    History of left-sided pneumothorax  · Stable, chronic    HPI:   Jailyn Montiel is a 80 y o  male with nausea, vomiting and diarrhea that started last evening  It began out of the blue  He did recently start colchicine  He has had decreased oral intake because of decreased appetite  He reports not seeing blood in either the vomit or his diarrhea but did see some red specks that he attributed to cranberry sauce  He has no dysuria  He has no chest pain, shortness of breath  He has no abdominal pain  Denies: Chest pain, Shortness of Breath, Nausea, Vomiting, Diarrhea, Dysuria    ROS:  A 12-point review of systems was done  Please see the HPI for the full details  All other systems negative      PMH:  Principal Problem:    KEO (acute kidney injury) ([facility] )  Active Problems:    CKD (chronic kidney disease) stage 4, GFR 15-29 ml/min ([facility])    Hypertension    Severe aortic stenosis    Combined systolic and diastolic heart failure ([facility])    Hypothyroidism    Pneumothorax    Sinus bradycardia    Sinus pause    Hyperkalemia      Past Medical History:   Diagnosis Date    KEO (acute kidney injury) ([facility] ) 12/27/2017    Aortic stenosis     severe    Cardiomyopathy ([facility] )     CHF [facility]congestive heart failure) ([facility])     CKD (chronic kidney disease) stage 3, GFR 30-59 ml/min     Combined systolic and diastolic heart failure ([facility])     GERD (gastroesophageal reflux disease)     Heart murmur     Hyperkalemia 12/27/2017    Hypertension     Hypothyroidism     hypothyroid    Pulmonary hypertension     Pulmonary hypertension, moderate to severe     Severe aortic stenosis     Sinus pause     Syncope      Past Surgical History:   Procedure Laterality Date    BACK SURGERY      BACK SURGERY      disc surgery    LUNG BIOPSY      pneumothorax     Social History     Social History    Marital status: /Civil Union     Spouse name: N/A    Number of children: N/A    Years of education: N/A     Occupational History     [employer]     Social History Main Topics    Smoking status: Former Smoker    Smokeless tobacco: Never Used    Alcohol use No    Drug use: No    Sexual activity: Not Asked     Other Topics Concern    None     Social History Narrative    None     Family History   Problem Relation Age of Onset    Cancer Brother        MED/ALLERGIES:  Current Facility-Administered Medications   Medication Dose Route Frequency Provider Last Rate Last Dose    acetaminophen (TYLENOL) oral suspension 650 mg  650 mg Oral Q6H PRN Montse Cunningham PA-C   650 mg at 12/26/17 5819    acetaminophen (TYLENOL) tablet 650 mg  650 mg Oral Q6H PRN Montse Cunningham PA-C        aspirin (ECOTRIN LOW STRENGTH) EC tablet 81 mg  81 mg Oral Daily Montse Cunningham PA-C  calcitriol (ROCALTROL) capsule 0 25 mcg  0 25 mcg Oral Daily Shamrock Heater, PA-C        dextrose 5 % and sodium chloride 0 45 % infusion  100 mL/hr Intravenous Continuous Rigoberto Catalan  mL/hr at 12/26/17 1736 100 mL/hr at 12/26/17 1736    dextrose 5 % and sodium chloride 0 45 % infusion  100 mL/hr Intravenous Continuous Shamrock Heater, PA-C        heparin (porcine) subcutaneous injection 5,000 Units  5,000 Units Subcutaneous [facility] Heater, PA-C        heparin (porcine) subcutaneous injection 5,000 Units  5,000 Units Subcutaneous [facility] Heater, PA-C        iron polysaccharides (NIFEREX) capsule 150 mg  150 mg Oral Daily Shamrock Heater, PA-C        levothyroxine tablet 50 mcg  50 mcg Oral Daily Shamrock Heater, PA-C        ondansetron [facility]) injection 4 mg  4 mg Intravenous Q6H PRN Shamrock Heater, PA-C        ondansetron [facility]) injection 4 mg  4 mg Intravenous Q6H PRN Shamrock Heater, PA-C        pantoprazole (PROTONIX) EC tablet 40 mg  40 mg Oral BID AC Shamrock Heater, PA-C         No Known Allergies    OBJECTIVE:    Current Vitals:   Blood Pressure: 118/57 (12/27/17 0016)  Pulse: 60 (12/27/17 0016)  Temperature: 97 8 °F (36 6 °C) (12/27/17 0016)  Temp Source: Tympanic (12/27/17 0016)  Respirations: 17 (12/27/17 0016)  Height: 5' 5" (165 1 cm) (12/26/17 1739)  Weight - Scale: 68 9 kg (152 lb) (12/26/17 1739)  SpO2: 97 % (12/27/17 0016)      Intake/Output Summary (Last 24 hours) at 12/27/17 0029  Last data filed at 12/26/17 1640   Gross per 24 hour   Intake              600 ml   Output                0 ml   Net              600 ml       Invasive Devices     Peripheral Intravenous Line            Peripheral IV 12/26/17 Right Antecubital less than 1 day                  Physical Exam   Constitutional: He is oriented to person, place, and time  Vital signs are normal  He appears well-developed and well-nourished  Non-toxic appearance  He does not have a sickly appearance     HENT: Head: Normocephalic and atraumatic  Right Ear: Hearing and external ear normal    Left Ear: Hearing and external ear normal    Nose: Nose normal    Mouth/Throat: Uvula is midline, oropharynx is clear and moist and mucous membranes are normal    Neck: Carotid bruit is not present  No thyroid mass and no thyromegaly present  Cardiovascular: Normal rate  An irregular rhythm present  Murmur heard  Systolic murmur is present with a grade of 3/6   Pulses:       Radial pulses are 2+ on the right side, and 2+ on the left side  Pulmonary/Chest: Breath sounds normal  No accessory muscle usage  No respiratory distress  Abdominal: Soft  Bowel sounds are normal  There is no tenderness  Neurological: He is alert and oriented to person, place, and time  No cranial nerve deficit or sensory deficit  Skin: Skin is warm and dry  Psychiatric: He has a normal mood and affect  His speech is normal and behavior is normal  Judgment and thought content normal  Cognition and memory are normal                                                      Lab Results:   Results from last 7 days  Lab Units 12/26/17  1457   WBC Thousand/uL 8 36   HEMOGLOBIN g/dL 12 0   HEMATOCRIT % 37 5   PLATELETS Thousands/uL 199      Results from last 7 days  Lab Units 12/26/17  1457   SODIUM mmol/L 138   POTASSIUM mmol/L 5 4*   CHLORIDE mmol/L 101   CO2 mmol/L 28   BUN mg/dL 84*   CREATININE mg/dL 4 03*   CALCIUM mg/dL 9 4   TOTAL PROTEIN g/dL 7 7   BILIRUBIN TOTAL mg/dL 0 57   ALK PHOS U/L 91   ALT U/L 55   AST U/L 38   GLUCOSE RANDOM mg/dL 145*     Lab Results   Component Value Date    CKTOTAL 103 12/26/2017    TROPONINI 0 16 (H) 10/08/2016         EKG, Pathology, and Other Studies:  EKG:  Atrial paced rhythm, LVH with secondary ST-T wave abnormalities without significant change compared to November 2016 EKG    VTE Prophylaxis: Heparin    Code Status: FULL    Counseling / Coordination of Care:    Total floor / unit time spent today 30 minutes  Anticipated Length of Stay will be: MORE THAN 2 (TWO) Midnights     Bibi Finney PA-C    This note has been constructed using a voice recognition system

## 2017-12-27 NOTE — CONSULTS
PHONE [address] Toxicology  Zeferino Neri 80 y o  male MRN: [id number]  Unit/Bed#: [place] [facility] 87 223-02 Encounter: [id number]      Reason for Consult / Principal Problem:  Chronic colchicine toxicity  Inpatient consult to Toxicology  Consult performed by: Miracle Christine ordered by: Jeanette Solomon        12/26/17  1600PM - initial consultation and recommendations     12/27/17  1:40AM - completion of consultation  ASSESSMENT:   71-year-old male with chronic colchicine toxicity  1  Chronic colchicine toxicity  2  Acute on chronic renal insufficiency  3  Positive troponin    RECOMMENDATIONS:    Please continue to provide supportive care with intravenous fluids and p o  diet to tolerance  Colchicine should be discontinued and he should not go back on colchicine at any point  Patient may be cleared from toxicological standpoint once renal function has improved and he has no gastrointestinal symptoms for 12 hours and laboratory markers have normalized, in particular renal function, white blood cells, and platelets  There is no specific antidote for colchicine  Should GI symptoms continue, please keep patient admitted and monitor for leukopenia, thrombocytopenia, and elevated CK  Please draw baseline CK at this time and check CBC and BMP Q6-8 hours  Please also monitor for worsening renal insufficiency and consider dialysis should patient be unable to urinate or meet other acute indications as colchicine is not removed by dialysis  Should acidosis occur with his GI symptoms consider checking iron concentration  The patient's presentation is consistent with chronic colchicine toxicity  Mechanism of action for colchicine is inhibition of microtubular formation and function, arresting cell division during mitosis  Colchicine is rapidly absorbed intracellularly and is not amenable to enhanced elimination  It is eliminated by the liver with a half-life of 4-31 hours   Colchicine toxicity should be suspected in chronic setting when accompanied by early symptoms of gastrointestinal distress including nausea, vomiting, diarrhea and abdominal pain  The syndrome may progress with potential for severe bloody diarrhea, shock, depressed cardiac contractility, and possibly delirium  The factors that contribute to precipitation of chronic colchicine poisoning include renal insufficiency, liver disease, and other drug interactions that may inhibit colchicine clearance  In this patient's setting, he has pre-existing renal insufficiency that likely contributed to colchicine toxicity  The diagnosis is clinical  Colchicine levels are not readily attainable and there is no specific antidote  Emergent complications may include supportive treatment of sudden respiratory depression, cardiac arrest, shock, rhabdomyolysis, and bone marrow suppression  Later complications include bone marrow suppression and patient should be monitored for leukopenia and thrombocytopenia which may occur as late as 4-5 days out  Alopecia may occur in 2-3 weeks  Myopathy and polyneuropathy may also result from chronic toxicity  Treatment is supportive  Dialysis is ineffective  Colchicine undergoes enterohepatic recirculation and multidose activated charcoal may assist in elimination, but is not a proven treatment  Should patient becomes severely leukopenic, please treat with granulocyte colony-stimulating factor  Please discuss this with toxicology if considered  1:46 AM  Recommendations discussed and reviewed with overnight ROBI Galeano    For further questions, please call [facility]  Service or Patient Access Center to reach the medical  on call  I also may be reached via [employer] [facility]  Please see additional teaching note below (if available)    Hx and PE limited by the dynamics of a phone consultation   I have not personally interviewed or evaluated the patient, but only advised based on the information provided to me  Primary provider is responsible for all clinical decisions  Pertinent history, physical exam and clinical findings and course discussed: Kj Rose is a 80y o  year old male who presents with vomiting and diarrhea, with worsening renal function after being started on colchicine for his gout as an outpatient  He has baseline renal dysfunction that has worsened since being on colchicine  Suicidality and self harm attempt denied by patient  Review of systems and physical exam not performed by me  Historical Information   Past Medical History:   Diagnosis Date    KEO (acute kidney [facility]) ([facility] [address] ) 12/27/2017    Aortic stenosis     severe    Cardiomyopathy ([facility] )     [facility]est[facility]     CKD (chronic kidney disease) stage 3, GFR 30-59 ml/min     Combined systolic and diastolic heart failure ([facility])     GERD (gastroesophageal reflux disease)     Heart murmur     Hyperkalemia 12/27/2017    Hypertension     Hypothyroidism     hypothyroid    Pulmonary hypertension     Pulmonary hypertension, moderate to severe     Severe aortic stenosis     Sinus pause     Syncope      Past Surgical History:   Procedure Laterality Date    BACK SURGERY      BACK SURGERY      disc surgery    LUNG BIOPSY      pneumothorax     Social History   History   Alcohol Use No     History   Drug Use No     History   Smoking Status    Former Smoker   Smokeless Tobacco    Never Used     Family History   Problem Relation Age of Onset    Cancer Brother         Prior to Admission medications    Medication Sig Start Date End Date Taking?  Authorizing Provider   acetaminophen (TYLENOL) 325 mg tablet Take 2 tablets by mouth every 6 (six) hours as needed for mild pain 11/3/16  Yes Darren Donato, DO   aspirin (ECOTRIN LOW STRENGTH) 81 mg EC tablet Take 81 mg by mouth daily   Yes Historical Provider, MD   calcitriol (ROCALTROL) 0 25 mcg capsule Take 0 25 mcg by mouth daily   Yes Historical Provider, MD   carvedilol (COREG) 3 125 mg tablet Take 1 tablet by mouth 2 (two) times a day with meals for 30 days 11/3/16 12/26/17 Yes Darren Donato DO   colchicine (COLCRYS) 0 6 mg tablet Take 0 6 mg by mouth every other day   Yes Historical Provider, MD   furosemide (LASIX) 20 mg tablet Take 20 mg by mouth 2 (two) times a day   Yes Historical Provider, MD   iron polysaccharides (NIFEREX) 150 mg capsule Take 150 mg by mouth 2 (two) times a day   Yes Historical Provider, MD   levothyroxine 50 mcg tablet Take 50 mcg by mouth daily   Yes Historical Provider, MD   pantoprazole (PROTONIX) 40 mg tablet Take 40 mg by mouth 2 (two) times a day   Yes Historical Provider, MD       Current Facility-Administered Medications   Medication Dose Route Frequency    acetaminophen (TYLENOL) oral suspension 650 mg  650 mg Oral Q6H PRN    acetaminophen (TYLENOL) tablet 650 mg  650 mg Oral Q6H PRN    aspirin (ECOTRIN LOW STRENGTH) EC tablet 81 mg  81 mg Oral Daily    calcitriol (ROCALTROL) capsule 0 25 mcg  0 25 mcg Oral Daily    dextrose 5 % and sodium chloride 0 45 % infusion  100 mL/hr Intravenous Continuous    dextrose 5 % and sodium chloride 0 45 % infusion  100 mL/hr Intravenous Continuous    heparin (porcine) subcutaneous injection 5,000 Units  5,000 Units Subcutaneous Q8H [facility]    heparin (porcine) subcutaneous injection 5,000 Units  5,000 Units Subcutaneous Q8H [facility]    iron polysaccharides (NIFEREX) capsule 150 mg  150 mg Oral Daily    levothyroxine tablet 50 mcg  50 mcg Oral Early Morning    ondansetron (ZOFRAN) injection 4 mg  4 mg Intravenous Q6H PRN    ondansetron (ZOFRAN) injection 4 mg  4 mg Intravenous Q6H PRN    pantoprazole (PROTONIX) EC tablet 40 mg  40 mg Oral BID AC       No Known Allergies    Objective       Intake/Output Summary (Last 24 hours) at 12/27/17 0122  Last data filed at 12/26/17 1640   Gross per 24 hour   Intake              600 ml   Output                0 ml   Net              600 ml Invasive Devices:   Peripheral IV 12/26/17 Right Antecubital (Active)   Site Assessment Clean;Dry; Intact 12/26/2017  6:00 PM   Dressing Type Transparent 12/26/2017  6:00 PM   Line Status Infusing 12/26/2017  6:00 PM   Dressing Status Clean;Dry; Intact 12/26/2017  6:00 PM       Vitals   Vitals:    12/26/17 1639 12/26/17 1739 12/26/17 1812 12/27/17 0016   BP: 127/58 120/53 121/56 118/57   TempSrc:   Tympanic Tympanic   Pulse: 61 60 60 60   Resp: 16 18 17 17   Patient Position - Orthostatic VS: Sitting  Lying Lying   Temp:   97 5 °F (36 4 °C) 97 8 °F (36 6 °C)         EKG, Pathology, and/or Other Studies: I have personally reviewed pertinent reports  Lab Results: I have personally reviewed pertinent reports  Labs:    Results from last 7 days  Lab Units 12/26/17  1457   WBC Thousand/uL 8 36   HEMOGLOBIN g/dL 12 0   HEMATOCRIT % 37 5   PLATELETS Thousands/uL 199   NEUTROS PCT % 78*   LYMPHS PCT % 12*   MONOS PCT % 9        Results from last 7 days  Lab Units 12/26/17  1457   SODIUM mmol/L 138   POTASSIUM mmol/L 5 4*   CHLORIDE mmol/L 101   CO2 mmol/L 28   BUN mg/dL 84*   CREATININE mg/dL 4 03*   CALCIUM mg/dL 9 4   TOTAL PROTEIN g/dL 7 7   BILIRUBIN TOTAL mg/dL 0 57   ALK PHOS U/L 91   ALT U/L 55   AST U/L 38   GLUCOSE RANDOM mg/dL 145*   MAGNESIUM mg/dL 2 9*        Results from last 7 days  Lab Units 12/26/17  1457   INR  1 12   PTT seconds 27                             Counseling / Coordination of Care  Total floor / unit time spent today 0 minutes  This was a phone consultation

## 2017-12-28 NOTE — PROGRESS NOTES
NEPHROLOGY PROGRESS NOTE   Jake Roblero 80 y o  male MRN: [id number]  Unit/Bed#: [place] [address] [facility] 87 223-02 Encounter: [id number]      ASSESSMENT & PLAN:    1  Acute kidney injury  -etiology has been thought to be related to a chronic colchicine toxicity per Toxicology note  -creatinine is not worsening up to 4 7 with a BUN of 100  -he states his urine output has been minimal  -urinalysis does show trace proteinuria, urine protein to creatinine ratio is 0 12  -strict intakes and outputs and daily weights  -urine eosinophil at 0%  -stop IV fluids  -hold all BP meds to avoid further hypotension  -will give 40 mg of IV Lasix b i d   -slightly elevated potassium, subtle signs of uremia, increased shortness of breath on exertion  -will plan on a non tunneled dialysis catheter to be placed and will need to initiate dialysis will schedule for tomorrow morning       2  Stage 4 chronic kidney disease with a baseline creatinine of around 2 9 and a BUN of around 6  -baseline creatinine is around 2 9    3  Gout  -uric acid is elevated at 13 will hold off on acute treatment for now  -will need higher doses of prednisone for gout flares     4  Anemia of chronic kidney disease  -currently on oral iron supplementation as an outpatient  -check iron studies, B12, folate, stool occult  -hemoglobins currently stable around 10 MCV around 90     5  Electrolyte  -electrolytes are currently stable potassium was slightly elevated at 5 4 this is improved now to 4 9  -avoid Kayexalate for increased risk of bowel necrosis     6   Acid-base status  -currently acceptable no need for bicarb supplementation     7  Bone mineral disease  -will check PTH, vitamin-D, monitor calcium as an outpatient     8    Combined systolic and diastolic congestive heart failure  -avoid fluid overload, start IV Lasix 40 mg IV b i d          SUBJECTIVE:    Patient seen now with more nausea and vomiting symptoms more shortness of minimal urine output    OBJECTIVE:  Current Weight: Weight - Scale: 72 1 kg (158 lb 15 2 oz)  Vitals:    12/28/17 0714   BP: 115/53   Pulse: 73   Resp: 20   Temp: (!) 97 1 °F (36 2 °C)   SpO2: 96%       Intake/Output Summary (Last 24 hours) at 12/28/17 1339  Last data filed at 12/28/17 1338   Gross per 24 hour   Intake             1404 ml   Output              275 ml   Net             1129 ml       General: conscious, cooperative, in not acute distress  Eyes: conjunctivae pink, anicteric sclerae  ENT: lips and mucous membranes moist  Neck: supple, no JVD  Chest: clear breath sounds bilateral, no crackles, ronchus or wheezings  CVS:  Systolic ejection murmur  Abdomen: soft, non-tender, non-distended, normoactive bowel sounds  Extremities: no edema of both legs  Skin: no rash  Neuro: awake, alert, oriented    Medications:    Current Facility-Administered Medications:     acetaminophen (TYLENOL) oral suspension 650 mg, 650 mg, Oral, Q6H PRN, ROBI Barakat-AKASH, 650 mg at 12/26/17 2359    acetaminophen (TYLENOL) tablet 650 mg, 650 mg, Oral, Q6H PRN, Simeon Dao PA-C    aspirin (ECOTRIN LOW STRENGTH) EC tablet 81 mg, 81 mg, Oral, Daily, ROBI Posadas-AKASH, 81 mg at 12/28/17 0914    furosemide (LASIX) injection 60 mg, 60 mg, Intravenous, BID (diuretic), Garrett Crump DO    heparin (porcine) subcutaneous injection 5,000 Units, 5,000 Units, Subcutaneous, Q8H [facility], 5,000 Units at 12/28/17 0605 **AND** Platelet count, , , Once, Simeon Dao PA-C    heparin (porcine) subcutaneous injection 5,000 Units, 5,000 Units, Subcutaneous, Q8H [facility], 5,000 Units at 12/27/17 1408 **AND** Platelet count, , , Once, Simeon Doa PA-C    levothyroxine tablet 50 mcg, 50 mcg, Oral, Early Morning, Simeon Dao PA-C, 50 mcg at 12/28/17 0605    ondansetron (ZOFRAN) injection 4 mg, 4 mg, Intravenous, Q6H PRN, Simeon Dao PA-C    ondansetron [facility]) injection 4 mg, 4 mg, Intravenous, Q6H PRN, Simeon Dao PA-C, 4 mg at 12/28/17 1301   pantoprazole (PROTONIX) EC tablet 40 mg, 40 mg, Oral, BID John MATTHEWS BETTY Downey, 40 mg at 12/28/17 6278    Invasive Devices:      Lab Results:     Results from last 7 days  Lab Units 12/28/17  0452 12/27/17  1545 12/27/17  0555 12/27/17  0554 12/27/17  0159 12/26/17  1812 12/26/17  1457   WBC Thousand/uL  --  9 94  --  8 48 9 55  --  8 36   HEMOGLOBIN g/dL  --  11 1*  --  10 6* 10 9*  --  12 0   HEMATOCRIT %  --  35 5*  --  33 4* 34 3*  --  37 5   PLATELETS Thousands/uL  --  179  --  172 172  --  199   SODIUM mmol/L 136  --  139  --  139  --  138   POTASSIUM mmol/L 5 6*  --  4 9  --  5 3  --  5 4*   CHLORIDE mmol/L 102  --  102  --  102  --  101   CO2 mmol/L 22  --  24  --  27  --  28   BUN mg/dL 100*  --  90*  --  89*  --  84*   CREATININE mg/dL 4 70*  --  4 37*  --  4 42*  --  4 03*   CALCIUM mg/dL 8 4  --  8 3  --  8 4  --  9 4   MAGNESIUM mg/dL  --   --   --   --   --   --  2 9*   ALBUMIN g/dL  --   --  3 0*  --  3 1*  --  3 6   TOTAL PROTEIN g/dL  --   --  6 5  --  6 6  --  7 7   BILIRUBIN TOTAL mg/dL  --   --  0 31  --  0 34  --  0 57   ALK PHOS U/L  --   --  89  --  91  --  91   ALT U/L  --   --  56  --  56  --  55   AST U/L  --   --  38  --  38  --  38   GLUCOSE RANDOM mg/dL 110  --  142*  --  152*  --  145*   PROTEIN UA mg/dl  --   --   --   --   --  Trace*  --    LEUKOCYTES UA   --   --   --   --   --  Negative  --    BLOOD UA   --   --   --   --   --  Negative  --        Previous work up:  Please see previous notes

## 2017-12-28 NOTE — PROCEDURES
Central Line Insertion  Date/Time: 12/28/2017 2:20 PM  Performed by: Tatiana Valderrama  Authorized by: Tatiana Valderrama     Patient location:  IR  Consent:     Consent obtained:  Written    Consent given by:  Patient    Risks discussed:  Bleeding and infection    Alternatives discussed:  No treatment  Universal protocol:     Procedure explained and questions answered to patient or proxy's satisfaction: yes      Relevant documents present and verified: yes      Test results available and properly labeled: yes      Imaging studies available: yes      Required blood products, implants, devices, and special equipment available: yes      Site/side marked: yes      Immediately prior to procedure, a time out was called: yes      Patient identity confirmed:  Verbally with patient and arm band  Pre-procedure details:     Hand hygiene: Hand hygiene performed prior to insertion      Sterile barrier technique: All elements of maximal sterile technique followed      Skin preparation:  ChloraPrep    Skin preparation agent: Skin preparation agent completely dried prior to procedure    Indications:     Central line indications: dialysis    Anesthesia (see MAR for exact dosages): Anesthesia method:  Local infiltration    Local anesthetic:  Lidocaine 1% w/o epi  Procedure details:     Location:  Right internal jugular    Vessel type: vein      Laterality:  Right    Approach: percutaneous technique used      Patient position:  Flat    Catheter type:  Double lumen    Catheter size:  14 Fr    Ultrasound guidance: yes      Sterile ultrasound techniques: Sterile gel and sterile probe covers were used      Number of attempts:  1    Successful placement: yes      Vessel of catheter tip end:  RA/SVC  Post-procedure details:     Post-procedure:  Line sutured and dressing applied    Assessment:  Blood return through all ports, free fluid flow and placement verified by x-ray    Patient tolerance of procedure:   Tolerated well, no immediate complications

## 2017-12-28 NOTE — PROGRESS NOTES
Progress Note -  Internal Medicine / Hospitalists  Franco Leiva 80 y o  male MRN: [id number]  Unit/Bed#: [facility] 68 2 [address] [facility] 223-02 Encounter: [id number]      ASSESSMENT AND PLAN:    Principle problem(s):   1  Acute renal failure on chronic kidney disease stage 4:  Suspected etiology due to colchicine toxicity  Toxicology following  Baseline creatinine 2 9/BUN 6   Worsening creatinine today from 4 37 to 4 70  Nephrology following  Scheduled for placement of dialysis tunnel catheter via IR  Anticipated dialysis in a m  Avoid hypotension; hold BP meds  2   Decreased urinary output:  Continue strict Is&Os  Intravenous fluids discontinued  Scheduled Lasix 60 mg b i d  Slight urinary retention with pre-voiding volume of 50 cc and postvoid volume of 60 cc    3  Dyspnea on exertion:  POA  Secondary to uremia? Concern for volume overload with chest x-ray demonstrating small bilateral pleural effusions and rales on auscultation  IV Lasix/dialysis initiated by Nephrology  4   Hyperkalemia:  Potassium 5 6  Monitor on telemetry  Correction with dialysis in a m  Avoid Kayexalate with risk of bowel necrosis  Active Problem(s):    5  Anemia of chronic disease:  Likely secondary to CKD  Pending iron, B12, folate, and occult stool  Hemoglobin stable at 10  6   Hypothyroidism:  Continue levothyroxine    7  Gout:  No acute flare  Uric acid level 13  Avoid colchicine for acute flares; utilize prednisone  8   GERD:  Continue PPI    9  Nausea without emesis:  Continue antiemetics    10  Severe aortic stenosis:  Avoid volume overload    Discharge Planning:     Code Status: Level 1 - Full Code  VTE Pharmacologic Prophylaxis: Heparin  VTE Mechanical Prophylaxis: sequential compression device     ____________________________________________________________    SUBJECTIVE:   Patient seen and examined  Patient with complaint of persistent nausea    Able to tolerate breakfast   No nausea with fluid removed it with 4 mg Zofran  Recent BM today  No dysuria  No abdominal tenderness or pain  REVIEW OF SYSTEMS    General:   No Fever or chills; No significant weight loss or gain  EENT:   No ear pain, facial swelling; No sneezing, sore throat  Skin:   No rashes, color changes  Respiratory:     No shortness of breath, cough, wheezing, stridor  Cardiovascular:     No chest pain, palpitations  Gastrointestinal:    No nausea, vomiting, diarrhea; No abdominal pain  Musculoskeletal:     No arthralgias, myalgias, swelling  Neurologic:   No dizziness, numbness, weakness  No speech difficulties  Psych:   No agitation, suicidal ideations  Otherwise, All other twelve-point review of systems normal      OBJECTIVE:     Vitals:     HR:  [55-73] 62  Resp:  [18-20] 20  BP: (115-128)/(51-62) 125/62  SpO2:  [96 %-97 %] 96 %  Temp (24hrs), Av 5 °F (36 4 °C), Min:97 1 °F (36 2 °C), Max:97 9 °F (36 6 °C)  Current: Temperature: (!) 97 1 °F (36 2 °C)    Intake/Output Summary (Last 24 hours) at 17 1510  Last data filed at 17 1338   Gross per 24 hour   Intake             1404 ml   Output              275 ml   Net             1129 ml       Physical Exam   Constitutional: He is oriented to person, place, and time  No distress  Mild discomfort secondary to nausea   HENT:   Head: Normocephalic  Mouth/Throat: Oropharynx is clear and moist    Eyes: Conjunctivae and EOM are normal  Pupils are equal, round, and reactive to light  Neck: Normal range of motion  Neck supple  No JVD present  No thyromegaly present  Cardiovascular: Regular rhythm  Murmur heard  Pulmonary/Chest: Effort normal  He has rales (Bilateral bases, L>R)  Abdominal: Soft  Bowel sounds are normal  He exhibits no distension  There is no tenderness  There is no rebound  Benign     Musculoskeletal: Normal range of motion  He exhibits no edema or tenderness  Lymphadenopathy:     He has no cervical adenopathy     Neurological: He is alert and oriented to person, place, and time  Skin: Skin is warm and dry  No rash noted  He is not diaphoretic  No erythema  Psychiatric: He has a normal mood and affect  His behavior is normal  Thought content normal      Lab, Imaging and other studies:    Results from last 7 days  Lab Units 12/27/17  1545  12/26/17  1457   WBC Thousand/uL 9 94  < > 8 36   HEMOGLOBIN g/dL 11 1*  < > 12 0   HEMATOCRIT % 35 5*  < > 37 5   PLATELETS Thousands/uL 179  < > 199   INR   --   --  1 12   < > = values in this interval not displayed  Results from last 7 days  Lab Units 12/28/17  0452 12/27/17  0555   SODIUM mmol/L 136 139   POTASSIUM mmol/L 5 6* 4 9   CHLORIDE mmol/L 102 102   CO2 mmol/L 22 24   BUN mg/dL 100* 90*   CREATININE mg/dL 4 70* 4 37*   CALCIUM mg/dL 8 4 8 3   TOTAL PROTEIN g/dL  --  6 5   BILIRUBIN TOTAL mg/dL  --  0 31   ALK PHOS U/L  --  89   ALT U/L  --  56   AST U/L  --  38   GLUCOSE RANDOM mg/dL 110 142*       Results from last 7 days  Lab Units 12/26/17  1457   CK TOTAL U/L 103     No results found for: Fatimah Kincaid, WOUNDCULT, SPUTUMCULTUR    Scheduled Meds:    aspirin 81 mg Oral Daily   furosemide 60 mg Intravenous BID (diuretic)   heparin (porcine) 5,000 Units Subcutaneous Q8H [address]   heparin (porcine) 5,000 Units Subcutaneous Q8H [address]   levothyroxine 50 mcg Oral Early Morning   pantoprazole 40 mg Oral BID AC     PRN Meds:    acetaminophen    acetaminophen    metoclopramide    ondansetron    ondansetron    Discussions with Specialists or Other Care Team Provider:  Plan of care discussed with attending physician, neurology, toxicology  Education and Discussions with Family / Patient:  Plan of care discussed with son, granddaughter, wife and the patient at bedside    Time Spent for Care: 36 minutes  More than 50% of total time spent on counseling and coordination of care as described above    Current Length of Stay: 2 day(s)  Current Patient Status: Inpatient     Today, Patient Was Seen By: Renaldo Fuchs, BETTY    NOTE: This record was composed with voice recognition software  Occasional wrong word or similar sounding substitutions result do to limitations of voice recognition software  Use context where substitutions of wrong wording have occurred

## 2017-12-29 NOTE — PLAN OF CARE
Problem: Potential for Falls  Goal: Patient will remain free of falls  INTERVENTIONS:  - Assess patient frequently for physical needs  -  Identify cognitive and physical deficits and behaviors that affect risk of falls  -  [place] fall precautions as indicated by assessment   - Educate patient/family on patient safety including physical limitations  - Instruct patient to call for assistance with activity based on assessment  - Modify environment to reduce risk of injury  - Consider OT/PT consult to assist with strengthening/mobility   Outcome: Progressing      Problem: DISCHARGE PLANNING  Goal: Discharge to home or other facility with appropriate resources  INTERVENTIONS:  - Identify barriers to discharge w/patient and caregiver  - Arrange for needed discharge resources and transportation as appropriate  - Identify discharge learning needs (meds, wound care, etc )  - Arrange for interpretive services to assist at discharge as needed  - Refer to Case Management Department for coordinating discharge planning if the patient needs post-hospital services based on physician/advanced practitioner order or complex needs related to functional status, cognitive ability, or social support system   Outcome: Progressing      Problem: Knowledge Deficit  Goal: Patient/family/caregiver demonstrates understanding of disease process, treatment plan, medications, and discharge instructions  Complete learning assessment and assess knowledge base    Interventions:  - Provide teaching at level of understanding  - Provide teaching via preferred learning methods   Outcome: Progressing      Problem: CARDIOVASCULAR - ADULT  Goal: Maintains optimal cardiac output and hemodynamic stability  INTERVENTIONS:  - Monitor I/O, vital signs and rhythm  - Monitor for S/S and trends of decreased cardiac output i e  bleeding, hypotension  - Administer and titrate ordered vasoactive medications to optimize hemodynamic stability  - Assess quality of pulses, skin color and temperature  - Assess for signs of decreased coronary artery perfusion - ex   Angina  - Instruct patient to report change in severity of symptoms  Outcome: Progressing    Goal: Absence of cardiac dysrhythmias or at baseline rhythm  INTERVENTIONS:  - Continuous cardiac monitoring, monitor vital signs, obtain 12 lead EKG if indicated  - Administer antiarrhythmic and heart rate control medications as ordered  - Monitor electrolytes and administer replacement therapy as ordered  Outcome: Progressing      Problem: GASTROINTESTINAL - ADULT  Goal: Minimal or absence of nausea and/or vomiting  INTERVENTIONS:  - Administer IV fluids as ordered to ensure adequate hydration  - Maintain NPO status until nausea and vomiting are resolved  - Nasogastric tube as ordered  - Administer ordered antiemetic medications as needed  - Provide nonpharmacologic comfort measures as appropriate  - Advance diet as tolerated, if ordered  - Nutrition services referral to assist patient with adequate nutrition and appropriate food choices  Outcome: Progressing    Goal: Maintains adequate nutritional intake  INTERVENTIONS:  - Monitor percentage of each meal consumed  - Identify factors contributing to decreased intake, treat as appropriate  - Assist with meals as needed  - Monitor I&O, WT and lab values  - Obtain nutrition services referral as needed  Outcome: Progressing      Problem: GENITOURINARY - ADULT  Goal: Maintains or returns to baseline urinary function  INTERVENTIONS:  - Assess urinary function  - Encourage oral fluids to ensure adequate hydration  - Administer IV fluids as ordered to ensure adequate hydration  - Administer ordered medications as needed  - Offer frequent toileting  - Follow urinary retention protocol if ordered  Outcome: Progressing    Goal: Absence of urinary retention  INTERVENTIONS:  - Assess patients ability to void and empty bladder  - Monitor I/O  - Bladder scan as needed  - Discuss with physician/AP medications to alleviate retention as needed  - Discuss catheterization for long term situations as appropriate  Outcome: Progressing      Problem: METABOLIC, FLUID AND ELECTROLYTES - ADULT  Goal: Electrolytes maintained within normal limits  INTERVENTIONS:  - Monitor labs and assess patient for signs and symptoms of electrolyte imbalances  - Administer electrolyte replacement as ordered  - Monitor response to electrolyte replacements, including repeat lab results as appropriate  - Instruct patient on fluid and nutrition as appropriate  Outcome: Progressing    Goal: Fluid balance maintained  INTERVENTIONS:  - Monitor labs and assess for signs and symptoms of volume excess or deficit  - Monitor I/O and WT  - Instruct patient on fluid and nutrition as appropriate  Outcome: Progressing      Problem: SKIN/TISSUE INTEGRITY - ADULT  Goal: Skin integrity remains intact  INTERVENTIONS  - Identify patients at risk for skin breakdown  - Assess and monitor skin integrity  - Assess and monitor nutrition and hydration status  - Monitor labs (i e  albumin)  - Assess for incontinence   - Turn and reposition patient  - Assist with mobility/ambulation  - Relieve pressure over bony prominences  - Avoid friction and shearing  - Provide appropriate hygiene as needed including keeping skin clean and dry  - Evaluate need for skin moisturizer/barrier cream  - Collaborate with interdisciplinary team (i e  Nutrition, Rehabilitation, etc )   - Patient/family teaching  Outcome: Progressing      Problem: MUSCULOSKELETAL - ADULT  Goal: Maintain or return mobility to safest level of function  INTERVENTIONS:  - Assess patient's ability to carry out ADLs; assess patient's baseline for ADL function and identify physical deficits which impact ability to perform ADLs (bathing, care of mouth/teeth, toileting, grooming, dressing, etc )  - Assess/evaluate cause of self-care deficits   - Assess range of motion  - Assess patient's mobility; develop plan if impaired  - Assess patient's need for assistive devices and provide as appropriate  - Encourage maximum independence but intervene and supervise when necessary  - Involve family in performance of ADLs  - Assess for home care needs following discharge   - Request OT consult to assist with ADL evaluation and planning for discharge  - Provide patient education as appropriate  Outcome: Progressing

## 2017-12-29 NOTE — PROGRESS NOTES
Progress Note -  Internal Medicine / Hospitalists  Janeregla Hubbard 80 y o  male MRN: [id number]  Unit/Bed#: [facility] 2 -01 Encounter: [id number]      ASSESSMENT AND PLAN:    Principle problem(s):   1  Acute renal failure on chronic kidney disease stage 4:  Etiology due to colchicine toxicity and ATN  Toxicology following  Baseline creatinine 2 9/BUN 6   Worsening creatinine today from 4 37 to 4 70 to 5 33  Nephrology following  Continue Lasix 60 mg IV b i d      - S/p  placement of dialysis tunnel catheter via IR; POD #1    - Tolerated dialysis with 1 5 L removal today  Anticipated dialysis in a m  and on Sunday (for holiday schedule )      - Avoid hypotension  If blood pressure stable, Coreg may be re-initiated at 3 125 mg b i d  following HD tomorrow    - Hyperkalemia improved with Lasix  Avoid Kayexalate with risk of bowel necrosis    - Hyperphosphatemia corrected with dialysis     - Decreased urinary output:  Continue strict Is&Os  Intravenous fluids discontinued  Scheduled Lasix 60 mg b i d       2  Dyspnea on exertion:   multifactorial with acute volume overload, pulmonary edema, aortic stenosis, and worsening renal function  IV Lasix/dialysis       Active Problem(s):     3  Anemia of chronic disease:  Likely secondary to CKD  Pending iron, B12, folate, and occult stool  Hemoglobin stable at 10      4  Hypothyroidism:  Continue levothyroxine     5   Gout:  No acute flare  Uric acid level 13  Avoid colchicine for acute flares; utilize prednisone      6  GERD:  Continue PPI     7  Nausea without emesis:  Continue antiemetics     8    Severe aortic stenosis:  Avoid volume overload    Discharge Planning: Not yet safe medically for d/c     Code Status: Level 1 - Full Code  VTE Pharmacologic Prophylaxis: Sequential compression device (Venodyne) Heparin   VTE Mechanical Prophylaxis: sequential compression device     ____________________________________________________________    SUBJECTIVE: Patient seen and examined  Patient is doing well  Tolerated dialysis without difficulty  Shortness of breath somewhat improved  Nausea resolved  Eating and drinking well post dialysis  REVIEW OF SYSTEMS    General:   No Fever or chills; No significant weight loss or gain  EENT:   No ear pain, facial swelling; No sneezing, sore throat  Skin:   No rashes, color changes  Respiratory:     No shortness of breath, cough, wheezing, stridor  Cardiovascular:     No chest pain, palpitations  Gastrointestinal:    No nausea, vomiting, diarrhea; No abdominal pain  Musculoskeletal:     No arthralgias, myalgias, swelling  Neurologic:   No dizziness, numbness, weakness  No speech difficulties  Psych:   No agitation, suicidal ideations  Otherwise, All other twelve-point review of systems normal      OBJECTIVE:     Vitals:     HR:  [62-74] 74  Resp:  [18-19] 18  BP: (119-140)/(51-66) 125/57  SpO2:  [96 %-99 %] 99 %  Temp (24hrs), Av 1 °F (36 7 °C), Min:98 °F (36 7 °C), Max:98 2 °F (36 8 °C)  Current: Temperature: 98 2 °F (36 8 °C)    Intake/Output Summary (Last 24 hours) at 17 1433  Last data filed at 17 1401   Gross per 24 hour   Intake              500 ml   Output             2442 ml   Net            -1942 ml       Physical Exam   Constitutional: He is oriented to person, place, and time  No distress  thin   HENT:   Head: Normocephalic  Mouth/Throat: Oropharynx is clear and moist    Eyes: Conjunctivae and EOM are normal  Pupils are equal, round, and reactive to light  Neck: Normal range of motion  Neck supple  No JVD present  No thyromegaly present  Cardiovascular:   Murmur (Loud aortic stenosis) heard  Pulmonary/Chest: He has rales (Faint crackles throughout lung fields bases greater than apex)  Dialysis access catheter on right chest wall   Abdominal: Soft  Bowel sounds are normal  He exhibits no distension  There is no tenderness  There is no rebound     Musculoskeletal: Normal range of motion  He exhibits no edema or tenderness  Lymphadenopathy:     He has no cervical adenopathy  Neurological: He is alert and oriented to person, place, and time  No cranial nerve deficit  Skin: Skin is warm and dry  No rash noted  He is not diaphoretic  No erythema  Psychiatric: He has a normal mood and affect  His behavior is normal  Thought content normal        Lab, Imaging and other studies:    Results from last 7 days  Lab Units 12/29/17  0610  12/26/17  1457   WBC Thousand/uL 8 77  < > 8 36   HEMOGLOBIN g/dL 11 5*  < > 12 0   HEMATOCRIT % 35 7*  < > 37 5   PLATELETS Thousands/uL 166  < > 199   INR   --   --  1 12   < > = values in this interval not displayed  Results from last 7 days  Lab Units 12/29/17  0610  12/27/17  0555   SODIUM mmol/L 137  < > 139   POTASSIUM mmol/L 5 2  < > 4 9   CHLORIDE mmol/L 100  < > 102   CO2 mmol/L 22  < > 24   BUN mg/dL 109*  < > 90*   CREATININE mg/dL 5 33*  < > 4 37*   CALCIUM mg/dL 8 5  < > 8 3   TOTAL PROTEIN g/dL  --   --  6 5   BILIRUBIN TOTAL mg/dL  --   --  0 31   ALK PHOS U/L  --   --  89   ALT U/L  --   --  56   AST U/L  --   --  38   GLUCOSE RANDOM mg/dL 115  < > 142*   < > = values in this interval not displayed      Results from last 7 days  Lab Units 12/26/17  1457   CK TOTAL U/L 103     No results found for: Madonna [place], WOUNDCULT, SPUTUMCULTUR    Scheduled Meds:    aspirin 81 mg Oral Daily   furosemide 60 mg Intravenous BID (diuretic)   heparin (porcine) 5,000 Units Subcutaneous Q8H [address]   heparin (porcine) 5,000 Units Subcutaneous Q8H [address]   levothyroxine 50 mcg Oral Early Morning   pantoprazole 40 mg Oral BID AC     PRN Meds:    acetaminophen    acetaminophen    guaiFENesin    metoclopramide    ondansetron    ondansetron    Discussions with Specialists or Other Care Team Provider:  Plan of care discussed with Nephrology; plan of care discussed with attending physician  Education and Discussions with Family / Patient:  Plan of care discussed with wife and patient at bedside    Time Spent for Care: 37 minutes  More than 50% of total time spent on counseling and coordination of care as described above  Current Length of Stay: 3 day(s)  Current Patient Status: Inpatient     Today, Patient Was Seen By: Jethro Coronel PA-C    NOTE: This record was composed with voice recognition software  Occasional wrong word or similar sounding substitutions result do to limitations of voice recognition software  Use context where substitutions of wrong wording have occurred

## 2017-12-29 NOTE — PROGRESS NOTES
NEPHROLOGY PROGRESS NOTE   Lidia Robison 80 y o  male MRN: [id number]  Unit/Bed#: [facility] 2 -01 Encounter: [id number]      ASSESSMENT & PLAN:    1  Acute kidney injury  -etiology likely multifactorial-has a significant cardiomyopathy, uric acid nephropathy could be playing a role, was on colchicine as an outpatient up dosed every other day, no thrombocytopenia, anemia essentially okay no leukopenia but also could have played a role less   -biopsy in this situation would likely not   -did make more urine over the course of the evening but renal function worsens with significant azotemia  -started on dialysis today indication:  Uremia, hyperkalemia, and volume him overload  -seen on dialysis at 8:59 a m -200 blood flow rate with a goal ultrafilter duration of 1 5 L, avoid hypotension  -will plan on a repeat treatment tomorrow, and Sunday (holiday schedule)  -Monday he will require a 24 hour creatinine clearance  -check BMPs daily  -continue Lasix 40 mg IV b i d   -urinalysis does show trace proteinuria, urine protein to creatinine ratio is 0 12  -strict intakes and outputs and daily weights  -urine eosinophil at 0%  -could restart Coreg added low-dose after dialysis in the next 24 hours at 3 125 mg b i d   -will give 40 mg of IV Lasix b i d   -consent obtained and in chart      2   Stage 4 chronic kidney disease with a baseline creatinine of around 2 9 and a BUN of around 6  -baseline creatinine is around 2 9  -monitor for renal recovery did have increased urine output yesterday  -24 hour creatinine clearance on Monday    3  Gout  -uric acid is elevated at 13 will hold off on acute treatment for now  -will need higher doses of prednisone for gout flares  -currently asymptomatic     4    Anemia of chronic kidney disease  -currently on oral iron supplementation as an outpatient  -iron saturation 11%, needs a ferritin, hemoglobin currently stage  -if requiring long-term dialysis can start Venofer 100 mg daily, after his 1st 3 treatments to avoid confusion if reaction occurred     5  Electrolyte  -hyperkalemia, K down to 5 2 with Lasix  -will place on a 2 potassium bath    6   Acid-base status  -currently acceptable no need for bicarb supplementation     7  Bone mineral disease  -will check PTH, vitamin-D, monitor calcium as an outpatient     8    Combined systolic and diastolic congestive heart failure  -avoid fluid overload, start IV Lasix 40 mg IV b i d, fluid removal with dialysis         SUBJECTIVE:    Patient seen no chest pain or shortness of Breath no fevers or chills nausea slightly better this morning urine output did increase somewhat    OBJECTIVE:  Current Weight: Weight - Scale: 72 1 kg (158 lb 15 2 oz)  Vitals:    12/29/17 0930   BP: 130/55   Pulse: 72   Resp:    Temp:    SpO2:        Intake/Output Summary (Last 24 hours) at 12/29/17 1009  Last data filed at 12/29/17 0840   Gross per 24 hour   Intake             1604 ml   Output              442 ml   Net             1162 ml       General: conscious, cooperative, in not acute distress  Eyes: conjunctivae pink, anicteric sclerae  ENT: lips and mucous membranes moist  Neck: supple, no JVD  Chest: clear breath sounds bilateral, no crackles, ronchus or wheezings  CVS:   systolic ejection murmur normal rate, regular rhythm  Abdomen: soft, non-tender, non-distended, normoactive bowel sounds  Extremities: no edema of both legs  Skin: no rash  Neuro: awake, alert, oriented      Medications:    Current Facility-Administered Medications:     acetaminophen (TYLENOL) oral suspension 650 mg, 650 mg, Oral, Q6H PRN, Louise Gannon PA-C, 650 mg at 12/28/17 2243    acetaminophen (TYLENOL) tablet 650 mg, 650 mg, Oral, Q6H PRN, Louise Gannon PA-C    aspirin (ECOTRIN LOW STRENGTH) EC tablet 81 mg, 81 mg, Oral, Daily, Homero Mercer PA-C, 81 mg at 12/28/17 0914    furosemide (LASIX) injection 60 mg, 60 mg, Intravenous, BID (diuretic), Jean Carlos Cookra, , 60 mg at 12/28/17 1613    heparin (porcine) subcutaneous injection 5,000 Units, 5,000 Units, Subcutaneous, Q8H [address], 5,000 Units at 12/28/17 0605 **AND** Platelet count, , , Once, Price Shukla PA-C    heparin (porcine) subcutaneous injection 5,000 Units, 5,000 Units, Subcutaneous, Q8H [address], 5,000 Units at 12/29/17 0559 **AND** Platelet count, , , Once, Price Shukla PA-C    levothyroxine tablet 50 mcg, 50 mcg, Oral, Early Morning, Price Shukla PA-C, 50 mcg at 12/29/17 0559    metoclopramide (REGLAN) injection 5 mg, 5 mg, Intravenous, Q8H PRN, Melody Gonzalez PA-C, 5 mg at 12/28/17 1757    [facility]ron [facility] injection 4 mg, 4 mg, Intravenous, Q6H PRN, Price Shukla PA-C, 4 mg at 12/28/17 2034    [facility]ron [facility] injection 4 mg, 4 mg, Intravenous, Q6H PRN, Price Shukla PA-C, 4 mg at 12/28/17 1306    pantoprazole (PROTONIX) EC tablet 40 mg, 40 mg, Oral, BID AC, Price Shukla PA-C, 40 mg at 12/29/17 0600    Invasive Devices:      Lab Results:     Results from last 7 days  Lab Units 12/29/17  0610 12/28/17  0452 12/27/17  1545 12/27/17  0555 12/27/17  0554 12/27/17  0159 12/26/17  1812 12/26/17  1457   WBC Thousand/uL 8 77  --  9 94  --  8 48 9 55  --  8 36   HEMOGLOBIN g/dL 11 5*  --  11 1*  --  10 6* 10 9*  --  12 0   HEMATOCRIT % 35 7*  --  35 5*  --  33 4* 34 3*  --  37 5   PLATELETS Thousands/uL 166  --  179  --  172 172  --  199   SODIUM mmol/L 137 136  --  139  --  139  --  138   POTASSIUM mmol/L 5 2 5 6*  --  4 9  --  5 3  --  5 4*   CHLORIDE mmol/L 100 102  --  102  --  102  --  101   CO2 mmol/L 22 22  --  24  --  27  --  28   BUN mg/dL 109* 100*  --  90*  --  89*  --  84*   CREATININE mg/dL 5 33* 4 70*  --  4 37*  --  4 42*  --  4 03*   CALCIUM mg/dL 8 5 8 4  --  8 3  --  8 4  --  9 4   MAGNESIUM mg/dL  --   --   --   --   --   --   --  2 9*   PHOSPHORUS mg/dL 6 1*  --   --   --   --   --   --   --    ALBUMIN g/dL 3 2*  --   --  3 0*  --  3 1*  --  3 6   TOTAL PROTEIN g/dL  --   --   --  6 5  --  6 6 --  7 7   BILIRUBIN TOTAL mg/dL  --   --   --  0 31  --  0 34  --  0 57   ALK PHOS U/L  --   --   --  89  --  91  --  91   ALT U/L  --   --   --  56  --  56  --  55   AST U/L  --   --   --  38  --  38  --  38   GLUCOSE RANDOM mg/dL 115 110  --  142*  --  152*  --  145*   PROTEIN UA mg/dl  --   --   --   --   --   --  Trace*  --    LEUKOCYTES UA   --   --   --   --   --   --  Negative  --    BLOOD UA   --   --   --   --   --   --  Negative  --        Previous work up:  Please see previous notes

## 2017-12-30 PROBLEM — N18.9 ACUTE KIDNEY INJURY SUPERIMPOSED ON CHRONIC KIDNEY DISEASE (HCC): Status: ACTIVE | Noted: 2017-01-01

## 2017-12-30 NOTE — PROGRESS NOTES
NEPHROLOGY PROGRESS NOTE   Perry Hubbard 80 y o  male MRN: [id number]  Unit/Bed#: [facility] 2 -01 Encounter: [id number]      ASSESSMENT and PLAN:  1  Acute kidney injury:  Felt to be multifactorial possibly from cardiomyopathy +/- uric acid nephropathy  He did not have a renal biopsy as this likely would not    -UA:  Trace protein, UPC ratio: 0 12g   -urine eos 0%   -   -due to azotemia and oliguria he was started on dialysis   -1st treatment yesterday   -currently seen at and examined on dialysis today:  3 hour treatment, blood flow 250, 2 K bath   -did have BP recorded in 80s last night but wonder if this was error as otherwise pressure has been ok    -made 175cc urine past 24 hours    -follow strict urine output   -plan is for treatment again tomorrow   -continue Lasix 60 IV b i d  to try and stimulate urine output   -monitor for signs of recovery   2  CKD stage 4:  Baseline creatinine mid to high 2s  Last 2 9 on 11/1   -follows with Dr Patricia Patel   3   Gout:  Uric acid was elevated at 13 but currently asymptomatic  4  Mineral bone disease of CKD: phos 5 and trending down    -not on binders   -check PTH  5  Anemia:  Iron sat 11%  Will check ferritin and then likely will need venofer    -would not start LB as hgb is at goal   6  Access:  Temporary HD catheter placed 12/28  7  Combined systolic and diastolic CHF:  On Lasix 60 IV b i d  and chest x-ray on 12/27 showed congestive heart failure and small bilateral pleural effusions        SUBJECTIVE:  Patient is seen and examined on dialysis and he is feeling okay today  He is urinating but states not very much at all  He has no chest pain or shortness of Breath    He is tolerating dialysis well    OBJECTIVE:  Current Weight: Weight - Scale: 72 2 kg (159 lb 2 8 oz)  Vitals:    12/30/17 0900   BP: 113/50   Pulse: 70   Resp:    Temp:    SpO2:        Intake/Output Summary (Last 24 hours) at 12/30/17 0696  Last data filed at 12/30/17 0820   Gross per 24 hour   Intake              500 ml   Output             2175 ml   Net            -1675 ml     General:  No acute distress  Skin:  No rash  Eyes:  Sclerae anicteric  ENT:  Moist mucous membranes  Neck:  Supple, symmetric  Chest:  Clear To auscultation bilaterally   CVS:  Regular rate and rhythm with systolic murmur  Abdomen:  Soft, nontender, nondistended  Extremities:  No edema  Neuro:  Awake and alert  Psych:  Appropriate     Medications:  Scheduled Meds:  aspirin 81 mg Oral Daily   furosemide 60 mg Intravenous BID (diuretic)   heparin (porcine) 5,000 Units Subcutaneous Q8H [address]   levothyroxine 50 mcg Oral Early Morning   pantoprazole 40 mg Oral BID AC       PRN Meds:   acetaminophen    acetaminophen    guaiFENesin    metoclopramide    ondansetron    ondansetron    Laboratory Results:  Lab Results   Component Value Date    WBC 8 46 12/30/2017    HGB 10 1 (L) 12/30/2017    HCT 31 7 (L) 12/30/2017    MCV 96 12/30/2017     (L) 12/30/2017     Lab Results   Component Value Date    GLUCOSE 101 12/30/2017    CALCIUM 8 1 (L) 12/30/2017     12/30/2017    K 4 4 12/30/2017    CO2 24 12/30/2017     12/30/2017    BUN 80 (H) 12/30/2017    CREATININE 4 43 (H) 12/30/2017     Lab Results   Component Value Date    CALCIUM 8 1 (L) 12/30/2017    PHOS 5 0 (H) 12/30/2017

## 2017-12-30 NOTE — PLAN OF CARE
Problem: Potential for Falls  Goal: Patient will remain free of falls  INTERVENTIONS:  - Assess patient frequently for physical needs  -  Identify cognitive and physical deficits and behaviors that affect risk of falls  -  [place] fall precautions as indicated by assessment   - Educate patient/family on patient safety including physical limitations  - Instruct patient to call for assistance with activity based on assessment  - Modify environment to reduce risk of injury  - Consider OT/PT consult to assist with strengthening/mobility   Outcome: Progressing      Problem: DISCHARGE PLANNING  Goal: Discharge to home or other facility with appropriate resources  INTERVENTIONS:  - Identify barriers to discharge w/patient and caregiver  - Arrange for needed discharge resources and transportation as appropriate  - Identify discharge learning needs (meds, wound care, etc )  - Arrange for interpretive services to assist at discharge as needed  - Refer to Case Management Department for coordinating discharge planning if the patient needs post-hospital services based on physician/advanced practitioner order or complex needs related to functional status, cognitive ability, or social support system   Outcome: Progressing      Problem: Knowledge Deficit  Goal: Patient/family/caregiver demonstrates understanding of disease process, treatment plan, medications, and discharge instructions  Complete learning assessment and assess knowledge base    Interventions:  - Provide teaching at level of understanding  - Provide teaching via preferred learning methods   Outcome: Progressing      Problem: CARDIOVASCULAR - ADULT  Goal: Maintains optimal cardiac output and hemodynamic stability  INTERVENTIONS:  - Monitor I/O, vital signs and rhythm  - Monitor for S/S and trends of decreased cardiac output i e  bleeding, hypotension  - Administer and titrate ordered vasoactive medications to optimize hemodynamic stability  - Assess quality of pulses, skin color and temperature  - Assess for signs of decreased coronary artery perfusion - ex   Angina  - Instruct patient to report change in severity of symptoms   Outcome: Progressing    Goal: Absence of cardiac dysrhythmias or at baseline rhythm  INTERVENTIONS:  - Continuous cardiac monitoring, monitor vital signs, obtain 12 lead EKG if indicated  - Administer antiarrhythmic and heart rate control medications as ordered  - Monitor electrolytes and administer replacement therapy as ordered   Outcome: Progressing      Problem: GASTROINTESTINAL - ADULT  Goal: Minimal or absence of nausea and/or vomiting  INTERVENTIONS:  - Administer IV fluids as ordered to ensure adequate hydration  - Maintain NPO status until nausea and vomiting are resolved  - Nasogastric tube as ordered  - Administer ordered antiemetic medications as needed  - Provide nonpharmacologic comfort measures as appropriate  - Advance diet as tolerated, if ordered  - Nutrition services referral to assist patient with adequate nutrition and appropriate food choices   Outcome: Progressing    Goal: Maintains adequate nutritional intake  INTERVENTIONS:  - Monitor percentage of each meal consumed  - Identify factors contributing to decreased intake, treat as appropriate  - Assist with meals as needed  - Monitor I&O, WT and lab values  - Obtain nutrition services referral as needed   Outcome: Progressing      Problem: GENITOURINARY - ADULT  Goal: Maintains or returns to baseline urinary function  INTERVENTIONS:  - Assess urinary function  - Encourage oral fluids to ensure adequate hydration  - Administer IV fluids as ordered to ensure adequate hydration  - Administer ordered medications as needed  - Offer frequent toileting  - Follow urinary retention protocol if ordered   Outcome: Progressing    Goal: Absence of urinary retention  INTERVENTIONS:  - Assess patients ability to void and empty bladder  - Monitor I/O  - Bladder scan as needed  - Discuss with physician/AP medications to alleviate retention as needed  - Discuss catheterization for long term situations as appropriate   Outcome: Progressing      Problem: METABOLIC, FLUID AND ELECTROLYTES - ADULT  Goal: Electrolytes maintained within normal limits  INTERVENTIONS:  - Monitor labs and assess patient for signs and symptoms of electrolyte imbalances  - Administer electrolyte replacement as ordered  - Monitor response to electrolyte replacements, including repeat lab results as appropriate  - Instruct patient on fluid and nutrition as appropriate   Outcome: Progressing    Goal: Fluid balance maintained  INTERVENTIONS:  - Monitor labs and assess for signs and symptoms of volume excess or deficit  - Monitor I/O and WT  - Instruct patient on fluid and nutrition as appropriate   Outcome: Progressing      Problem: SKIN/TISSUE INTEGRITY - ADULT  Goal: Skin integrity remains intact  INTERVENTIONS  - Identify patients at risk for skin breakdown  - Assess and monitor skin integrity  - Assess and monitor nutrition and hydration status  - Monitor labs (i e  albumin)  - Assess for incontinence   - Turn and reposition patient  - Assist with mobility/ambulation  - Relieve pressure over bony prominences  - Avoid friction and shearing  - Provide appropriate hygiene as needed including keeping skin clean and dry  - Evaluate need for skin moisturizer/barrier cream  - Collaborate with interdisciplinary team (i e  Nutrition, Rehabilitation, etc )   - Patient/family teaching   Outcome: Progressing      Problem: MUSCULOSKELETAL - ADULT  Goal: Maintain or return mobility to safest level of function  INTERVENTIONS:  - Assess patient's ability to carry out ADLs; assess patient's baseline for ADL function and identify physical deficits which impact ability to perform ADLs (bathing, care of mouth/teeth, toileting, grooming, dressing, etc )  - Assess/evaluate cause of self-care deficits   - Assess range of motion  - Assess patient's mobility; develop plan if impaired  - Assess patient's need for assistive devices and provide as appropriate  - Encourage maximum independence but intervene and supervise when necessary  - Involve family in performance of ADLs  - Assess for home care needs following discharge   - Request OT consult to assist with ADL evaluation and planning for discharge  - Provide patient education as appropriate   Outcome: Progressing      Problem: Prexisting or High Potential for Compromised Skin Integrity  Goal: Skin integrity is maintained or improved  INTERVENTIONS:  - Identify patients at risk for skin breakdown  - Assess and monitor skin integrity  - Assess and monitor nutrition and hydration status  - Monitor labs (i e  albumin)  - Assess for incontinence   - Turn and reposition patient  - Assist with mobility/ambulation  - Relieve pressure over bony prominences  - Avoid friction and shearing  - Provide appropriate hygiene as needed including keeping skin clean and dry  - Evaluate need for skin moisturizer/barrier cream  - Collaborate with interdisciplinary team (i e  Nutrition, Rehabilitation, etc )   - Patient/family teaching   Outcome: Progressing

## 2017-12-30 NOTE — PROGRESS NOTES
[address] Internal Medicine Progress Note  Patient: Naldo Hunt 80 y o  male   MRN: [id number]  PCP: Lori Snow DO  Unit/Bed#: [place] [facility] - Encounter: [id number]  Date Of Visit: 17    Assessment and plan:    Principal Problem:    Acute kidney injury superimposed on chronic kidney disease ([address] )- secondary to cardiomyopathy versus chronic colchicine toxicity  He is currently tolerating hemodialysis  Creatinine is trending down  Plan is for hemodialysis again tomorrow  Monitor labs and electrolytes  Active Problems:    Severe aortic stenosis- restart Coreg at 3 125 mg b i d  Combined systolic and diastolic heart failure (HCC)-improved volume status after HD  Hypertension-restart Coreg with hold parameters    Hypothyroidism-continue levothyroxine daily    Hyperkalemia-resolved    Asthenia due to disease-consult PT OT          VTE Pharmacologic Prophylaxis:   Pharmacologic: Heparin  Mechanical VTE Prophylaxis in Place: No    Education and Discussions with Family / Patient:  Spoke with wife and son at bedside    Time Spent for Care: 20 minutes  More than 50% of total time spent on counseling and coordination of care as described above  Current Length of Stay: 4 day(s)    Current Patient Status: Inpatient   Certification Statement: The patient will continue to require additional inpatient hospital stay due to Acute kidney injury    Discharge Plan:  Not ready for discharge    Code Status: Level 1 - Full Code      Subjective: Tolerated dialysis today  No shortness of breath  No muscle cramping  Still with low urine output    Objective:     Vitals:   Temp (24hrs), Av 2 °F (36 8 °C), Min:97 9 °F (36 6 °C), Max:98 8 °F (37 1 °C)    HR:  [65-76] 69  Resp:  [18] 18  BP: ()/(45-70) 112/48  SpO2:  [98 %-100 %] 99 %  Body mass index is 26 49 kg/m²  Input and Output Summary (last 24 hours):        Intake/Output Summary (Last 24 hours) at 17 1300  Last data filed at 17 1130   Gross per 24 hour   Intake              500 ml   Output             2633 ml   Net            -2133 ml       Physical Exam:     Physical Exam   Constitutional: He appears well-developed  No distress  HENT:   Pale   Eyes: No scleral icterus  Cardiovascular: Regular rhythm  Murmur heard  Pulmonary/Chest: Effort normal  No stridor  No respiratory distress  He has no wheezes  Right upper chest HD cath without sign of infection   Abdominal: Soft  He exhibits no distension  There is no tenderness  Musculoskeletal: He exhibits no edema  Neurological: He is alert  Skin: Skin is warm  He is not diaphoretic  Psychiatric: He has a normal mood and affect  Additional Data:     Labs:      Results from last 7 days  Lab Units 12/30/17  0833  12/26/17  1457   WBC Thousand/uL 8 46  < > 8 36   HEMOGLOBIN g/dL 10 1*  < > 12 0   HEMATOCRIT % 31 7*  < > 37 5   PLATELETS Thousands/uL 137*  < > 199   NEUTROS PCT %  --   --  78*   LYMPHS PCT %  --   --  12*   MONOS PCT %  --   --  9   EOS PCT %  --   --  1   < > = values in this interval not displayed  Results from last 7 days  Lab Units 12/30/17  0833 12/29/17  1615   SODIUM mmol/L 137  --    POTASSIUM mmol/L 4 4  --    CHLORIDE mmol/L 101  --    CO2 mmol/L 24  --    BUN mg/dL 80*  --    CREATININE mg/dL 4 43*  --    CALCIUM mg/dL 8 1*  --    TOTAL PROTEIN g/dL  --  6 8   BILIRUBIN TOTAL mg/dL  --  0 82   ALK PHOS U/L  --  112   ALT U/L  --  352*   AST U/L  --  180*   GLUCOSE RANDOM mg/dL 101  --        Results from last 7 days  Lab Units 12/26/17  1457   INR  1 12       * I Have Reviewed All Lab Data Listed Above  * Additional Pertinent Lab Tests Reviewed:  All Labs Within Last 24 Hours Reviewed    Imaging:    Imaging Reports Reviewed Today Include:  No new imaging      Recent Cultures (last 7 days):           Last 24 Hours Medication List:     aspirin 81 mg Oral Daily   furosemide 60 mg Intravenous BID (diuretic)   heparin (porcine) 5,000 Units Subcutaneous Q8H [facility]   levothyroxine 50 mcg Oral Early Morning   pantoprazole 40 mg Oral BID AC        Today, Patient Was Seen By: Janet Jade MD    ** Please Note: Dragon 360 Dictation voice to text software may have been used in the creation of this document   **

## 2017-12-31 NOTE — PROGRESS NOTES
NEPHROLOGY PROGRESS NOTE   Macy Snow 80 y o  male MRN: [id number]  Unit/Bed#: [place] [address] 2 -01 Encounter: [id number]      ASSESSMENT and PLAN:  1  Acute kidney injury:  Felt to be multifactorial possibly from cardiomyopathy +/- uric acid nephropathy  He did not have a renal biopsy as this likely would not    -UA:  Trace protein, UPC ratio: 0 12g   -urine eos 0%   -   -due to azotemia and oliguria he was started on dialysis on 12/29   -he is getting his 3rd dialysis treatment today   -currently seen at and examined on dialysis today:  3 hour treatment, blood flow 300, 3K bath, goal 2L UF   -only 125cc urine recorded for yesterday    -follow strict urine output   -will follow labs over next few days but with holiday schedule his next planned treatment is Wednesday    -continue Lasix 60 IV b i d  to try and stimulate urine output   -monitor for signs of recovery   2  CKD stage 4:  Baseline creatinine mid to high 2s  Last 2 9 on 11/1   -follows with Dr Roxana Gilliland   3   Gout:  Uric acid was elevated at 13 but currently asymptomatic  4  Mineral bone disease of CKD: phos 5 and trending down    -not on binders   -PTH pending   5  Anemia:  Iron sat 11%  Ferritin pending and then likely will need venofer    -would not start LB as hgb is at goal   6  Access:  Temporary HD catheter placed 12/28 and if remains on HD would need conversion to permacath next week   7  Combined systolic and diastolic CHF:  On Lasix 60 IV b i d  and chest x-ray on 12/27 showed congestive heart failure and small bilateral pleural effusions      Case management consult to start working on outpatient HD spot so that this does not hold up discharge       SUBJECTIVE:  Patient is seen examined on dialysis  He is feeling okay today  He still has very little urine output  He has no chest pain or shortness of breath      OBJECTIVE:  Current Weight: Weight - Scale: 72 2 kg (159 lb 2 8 oz)  Vitals:    12/31/17 0830   BP: 129/51   Pulse: 72   Resp:    Temp:    SpO2:        Intake/Output Summary (Last 24 hours) at 12/31/17 0841  Last data filed at 12/31/17 7904   Gross per 24 hour   Intake              980 ml   Output             2683 ml   Net            -1703 ml     General:  No acute distress  Skin:  No rash  Eyes:  Sclerae anicteric  ENT:  Moist mucous membranes  Neck:  Supple, symmetric  Chest:  Decreased breath sounds bilaterally   CVS:  Regular rate and rhythm  Abdomen:  Soft, nontender, nondistended  Extremities:  No edema   Neuro:  Awake and alert  Psych:  Appropriate affect      Medications:  Scheduled Meds:    aspirin 81 mg Oral Daily   carvedilol 3 125 mg Oral BID With Meals   furosemide 60 mg Intravenous BID (diuretic)   heparin (porcine) 5,000 Units Subcutaneous Q8H [address]   levothyroxine 50 mcg Oral Early Morning   pantoprazole 40 mg Oral BID AC       PRN Meds:   acetaminophen    acetaminophen    guaiFENesin    metoclopramide    ondansetron    ondansetron    Laboratory Results:  Lab Results   Component Value Date    WBC 8 46 12/30/2017    HGB 10 1 (L) 12/30/2017    HCT 31 7 (L) 12/30/2017    MCV 96 12/30/2017     (L) 12/30/2017     Lab Results   Component Value Date    GLUCOSE 100 12/31/2017    CALCIUM 8 2 (L) 12/31/2017     12/31/2017    K 4 3 12/31/2017    CO2 26 12/31/2017    CL 99 (L) 12/31/2017    BUN 63 (H) 12/31/2017    CREATININE 4 50 (H) 12/31/2017     Lab Results   Component Value Date    CALCIUM 8 2 (L) 12/31/2017    PHOS 5 0 (H) 12/30/2017

## 2017-12-31 NOTE — PROGRESS NOTES
[address] Internal Medicine Progress Note  Patient: Benjamin Levin 80 y o  male   MRN: [id number]  PCP: Gianna Garcia DO  Unit/Bed#: [place] 2 -01 Encounter: [id number]  Date Of Visit: 12/31/17    Assessment and plan:    Principal Problem:    Acute kidney injury superimposed on chronic kidney disease ([address] )- secondary to ATN from cardiomyopathy versus chronic colchicine toxicity  He has tolerated 3rd session of hemodialysis today  Next dialysis planned for Tuesday by Nephrology  Active Problems:    Severe aortic stenosis-carvedilol restarted  Not on Ace or Arb due to acute kidney injury    Combined systolic and diastolic heart failure (HCC)-he has bilateral crackles on exam   Check stat PA and lateral x-ray  Although they have removed 2 5 L from ultrafiltration today, he still sounds wet  Discussed with Nephrology  If chest x-ray confirms pulmonary edema, increased Lasix to 80 mg q 8 hours  Hypertension-carvedilol restarted at lower dose 3 125 mg b i d  Hypothyroidism-levothyroxine daily    Hyperkalemia-resolved via HD    Asthenia due to disease-consult physical therapy          VTE Pharmacologic Prophylaxis:   Pharmacologic: Heparin  Mechanical VTE Prophylaxis in Place: No    Discussions with Specialists or Other Care Team Provider:  Spoke with Dr Jose Eduardo Gr    Education and Discussions with Family / Patient:  Spoke with son at bedside    Time Spent for Care: 30 minutes  More than 50% of total time spent on counseling and coordination of care as described above  Current Length of Stay: 5 day(s)    Current Patient Status: Inpatient   Certification Statement: The patient will continue to require additional inpatient hospital stay due to Acute kidney injury    Discharge Plan:  Not ready for discharge any time soon    Code Status: Level 1 - Full Code      Subjective:   Patient with cough  No fever  He still urinates  Tolerated hemodialysis    He feels weak    Objective:     Vitals:   Temp (24hrs), Av 4 °F (36 3 °C), Min:96 5 °F (35 8 °C), Max:98 3 °F (36 8 °C)    HR:  [54-79] 79  Resp:  [17-18] 17  BP: ()/(44-59) 123/59  SpO2:  [96 %-100 %] 100 %  Body mass index is 26 49 kg/m²  Input and Output Summary (last 24 hours): Intake/Output Summary (Last 24 hours) at 17 1617  Last data filed at 17 1057   Gross per 24 hour   Intake              740 ml   Output             2600 ml   Net            -1860 ml       Physical Exam:     Physical Exam   Constitutional: No distress  HENT:   Head: Normocephalic  Mouth/Throat: No oropharyngeal exudate  Pale   Eyes: No scleral icterus  Cardiovascular: Regular rhythm  Murmur heard  Pulmonary/Chest: No stridor  He has wheezes  Bilateral crackles    Abdominal: Soft  He exhibits no distension  There is no tenderness  Musculoskeletal: He exhibits no edema  Neurological: He is alert  Skin: Skin is warm  He is not diaphoretic  Psychiatric: He has a normal mood and affect  Additional Data:     Labs:      Results from last 7 days  Lab Units 17  0833  17  1457   WBC Thousand/uL 8 46  < > 8 36   HEMOGLOBIN g/dL 10 1*  < > 12 0   HEMATOCRIT % 31 7*  < > 37 5   PLATELETS Thousands/uL 137*  < > 199   NEUTROS PCT %  --   --  78*   LYMPHS PCT %  --   --  12*   MONOS PCT %  --   --  9   EOS PCT %  --   --  1   < > = values in this interval not displayed  Results from last 7 days  Lab Units 17  0458  17  1615   SODIUM mmol/L 138  < >  --    POTASSIUM mmol/L 4 3  < >  --    CHLORIDE mmol/L 99*  < >  --    CO2 mmol/L 26  < >  --    BUN mg/dL 63*  < >  --    CREATININE mg/dL 4 50*  < >  --    CALCIUM mg/dL 8 2*  < >  --    TOTAL PROTEIN g/dL  --   --  6 8   BILIRUBIN TOTAL mg/dL  --   --  0 82   ALK PHOS U/L  --   --  112   ALT U/L  --   --  352*   AST U/L  --   --  180*   GLUCOSE RANDOM mg/dL 100  < >  --    < > = values in this interval not displayed      Results from last 7 days  Lab Units 17  1457   INR 1 12       * I Have Reviewed All Lab Data Listed Above  * Additional Pertinent Lab Tests Reviewed: All Labs Within Last 24 Hours Reviewed    Imaging:    Imaging Reports Reviewed Today Include:  No new imaging  Chest x-ray ordered      Recent Cultures (last 7 days):           Last 24 Hours Medication List:     aspirin 81 mg Oral Daily   carvedilol 3 125 mg Oral BID With Meals   furosemide 60 mg Intravenous BID (diuretic)   heparin (porcine) 5,000 Units Subcutaneous Q8H [address]   levothyroxine 50 mcg Oral Early Morning   pantoprazole 40 mg Oral BID AC        Today, Patient Was Seen By: Brenda Wells MD    ** Please Note: Dragon 360 Dictation voice to text software may have been used in the creation of this document   **

## 2017-12-31 NOTE — PLAN OF CARE
Problem: Potential for Falls  Goal: Patient will remain free of falls  INTERVENTIONS:  - Assess patient frequently for physical needs  -  Identify cognitive and physical deficits and behaviors that affect risk of falls  -  [place] fall precautions as indicated by assessment   - Educate patient/family on patient safety including physical limitations  - Instruct patient to call for assistance with activity based on assessment  - Modify environment to reduce risk of injury  - Consider OT/PT consult to assist with strengthening/mobility   Outcome: Progressing      Problem: DISCHARGE PLANNING  Goal: Discharge to home or other facility with appropriate resources  INTERVENTIONS:  - Identify barriers to discharge w/patient and caregiver  - Arrange for needed discharge resources and transportation as appropriate  - Identify discharge learning needs (meds, wound care, etc )  - Arrange for interpretive services to assist at discharge as needed  - Refer to Case Management Department for coordinating discharge planning if the patient needs post-hospital services based on physician/advanced practitioner order or complex needs related to functional status, cognitive ability, or social support system   Outcome: Progressing      Problem: Knowledge Deficit  Goal: Patient/family/caregiver demonstrates understanding of disease process, treatment plan, medications, and discharge instructions  Complete learning assessment and assess knowledge base    Interventions:  - Provide teaching at level of understanding  - Provide teaching via preferred learning methods   Outcome: Progressing      Problem: CARDIOVASCULAR - ADULT  Goal: Maintains optimal cardiac output and hemodynamic stability  INTERVENTIONS:  - Monitor I/O, vital signs and rhythm  - Monitor for S/S and trends of decreased cardiac output i e  bleeding, hypotension  - Administer and titrate ordered vasoactive medications to optimize hemodynamic stability  - Assess quality of pulses, skin color and temperature  - Assess for signs of decreased coronary artery perfusion - ex   Angina  - Instruct patient to report change in severity of symptoms   Outcome: Progressing    Goal: Absence of cardiac dysrhythmias or at baseline rhythm  INTERVENTIONS:  - Continuous cardiac monitoring, monitor vital signs, obtain 12 lead EKG if indicated  - Administer antiarrhythmic and heart rate control medications as ordered  - Monitor electrolytes and administer replacement therapy as ordered   Outcome: Progressing      Problem: GASTROINTESTINAL - ADULT  Goal: Minimal or absence of nausea and/or vomiting  INTERVENTIONS:  - Administer IV fluids as ordered to ensure adequate hydration  - Maintain NPO status until nausea and vomiting are resolved  - Nasogastric tube as ordered  - Administer ordered antiemetic medications as needed  - Provide nonpharmacologic comfort measures as appropriate  - Advance diet as tolerated, if ordered  - Nutrition services referral to assist patient with adequate nutrition and appropriate food choices   Outcome: Progressing    Goal: Maintains adequate nutritional intake  INTERVENTIONS:  - Monitor percentage of each meal consumed  - Identify factors contributing to decreased intake, treat as appropriate  - Assist with meals as needed  - Monitor I&O, WT and lab values  - Obtain nutrition services referral as needed   Outcome: Progressing      Problem: GENITOURINARY - ADULT  Goal: Maintains or returns to baseline urinary function  INTERVENTIONS:  - Assess urinary function  - Encourage oral fluids to ensure adequate hydration  - Administer IV fluids as ordered to ensure adequate hydration  - Administer ordered medications as needed  - Offer frequent toileting  - Follow urinary retention protocol if ordered   Outcome: Progressing    Goal: Absence of urinary retention  INTERVENTIONS:  - Assess patients ability to void and empty bladder  - Monitor I/O  - Bladder scan as needed  - Discuss with physician/AP medications to alleviate retention as needed  - Discuss catheterization for long term situations as appropriate   Outcome: Progressing      Problem: METABOLIC, FLUID AND ELECTROLYTES - ADULT  Goal: Electrolytes maintained within normal limits  INTERVENTIONS:  - Monitor labs and assess patient for signs and symptoms of electrolyte imbalances  - Administer electrolyte replacement as ordered  - Monitor response to electrolyte replacements, including repeat lab results as appropriate  - Instruct patient on fluid and nutrition as appropriate   Outcome: Progressing    Goal: Fluid balance maintained  INTERVENTIONS:  - Monitor labs and assess for signs and symptoms of volume excess or deficit  - Monitor I/O and WT  - Instruct patient on fluid and nutrition as appropriate   Outcome: Progressing      Problem: SKIN/TISSUE INTEGRITY - ADULT  Goal: Skin integrity remains intact  INTERVENTIONS  - Identify patients at risk for skin breakdown  - Assess and monitor skin integrity  - Assess and monitor nutrition and hydration status  - Monitor labs (i e  albumin)  - Assess for incontinence   - Turn and reposition patient  - Assist with mobility/ambulation  - Relieve pressure over bony prominences  - Avoid friction and shearing  - Provide appropriate hygiene as needed including keeping skin clean and dry  - Evaluate need for skin moisturizer/barrier cream  - Collaborate with interdisciplinary team (i e  Nutrition, Rehabilitation, etc )   - Patient/family teaching   Outcome: Progressing      Problem: MUSCULOSKELETAL - ADULT  Goal: Maintain or return mobility to safest level of function  INTERVENTIONS:  - Assess patient's ability to carry out ADLs; assess patient's baseline for ADL function and identify physical deficits which impact ability to perform ADLs (bathing, care of mouth/teeth, toileting, grooming, dressing, etc )  - Assess/evaluate cause of self-care deficits   - Assess range of motion  - Assess patient's mobility; develop plan if impaired  - Assess patient's need for assistive devices and provide as appropriate  - Encourage maximum independence but intervene and supervise when necessary  - Involve family in performance of ADLs  - Assess for home care needs following discharge   - Request OT consult to assist with ADL evaluation and planning for discharge  - Provide patient education as appropriate   Outcome: Progressing      Problem: Prexisting or High Potential for Compromised Skin Integrity  Goal: Skin integrity is maintained or improved  INTERVENTIONS:  - Identify patients at risk for skin breakdown  - Assess and monitor skin integrity  - Assess and monitor nutrition and hydration status  - Monitor labs (i e  albumin)  - Assess for incontinence   - Turn and reposition patient  - Assist with mobility/ambulation  - Relieve pressure over bony prominences  - Avoid friction and shearing  - Provide appropriate hygiene as needed including keeping skin clean and dry  - Evaluate need for skin moisturizer/barrier cream  - Collaborate with interdisciplinary team (i e  Nutrition, Rehabilitation, etc )   - Patient/family teaching   Outcome: Progressing

## 2018-01-01 ENCOUNTER — APPOINTMENT (EMERGENCY)
Dept: RADIOLOGY | Facility: HOSPITAL | Age: 83
DRG: 280 | End: 2018-01-01
Payer: MEDICARE

## 2018-01-01 ENCOUNTER — TRANSCRIBE ORDERS (OUTPATIENT)
Dept: ADMINISTRATIVE | Facility: HOSPITAL | Age: 83
End: 2018-01-01

## 2018-01-01 ENCOUNTER — HOSPITAL ENCOUNTER (INPATIENT)
Facility: HOSPITAL | Age: 83
LOS: 1 days | DRG: 280 | End: 2018-01-24
Attending: EMERGENCY MEDICINE | Admitting: INTERNAL MEDICINE
Payer: MEDICARE

## 2018-01-01 ENCOUNTER — TRANSITIONAL CARE MANAGEMENT (OUTPATIENT)
Dept: FAMILY MEDICINE CLINIC | Facility: CLINIC | Age: 83
End: 2018-01-01

## 2018-01-01 ENCOUNTER — APPOINTMENT (EMERGENCY)
Dept: CT IMAGING | Facility: HOSPITAL | Age: 83
DRG: 280 | End: 2018-01-01
Payer: MEDICARE

## 2018-01-01 ENCOUNTER — APPOINTMENT (INPATIENT)
Dept: RADIOLOGY | Facility: HOSPITAL | Age: 83
DRG: 673 | End: 2018-01-01
Payer: MEDICARE

## 2018-01-01 ENCOUNTER — APPOINTMENT (INPATIENT)
Dept: DIALYSIS | Facility: HOSPITAL | Age: 83
DRG: 673 | End: 2018-01-01
Payer: MEDICARE

## 2018-01-01 ENCOUNTER — APPOINTMENT (INPATIENT)
Dept: CT IMAGING | Facility: HOSPITAL | Age: 83
DRG: 280 | End: 2018-01-01
Payer: MEDICARE

## 2018-01-01 ENCOUNTER — APPOINTMENT (INPATIENT)
Dept: DIALYSIS | Facility: HOSPITAL | Age: 83
DRG: 673 | End: 2018-01-01
Attending: INTERNAL MEDICINE
Payer: MEDICARE

## 2018-01-01 ENCOUNTER — APPOINTMENT (INPATIENT)
Dept: RADIOLOGY | Facility: HOSPITAL | Age: 83
DRG: 673 | End: 2018-01-01
Attending: INTERNAL MEDICINE
Payer: MEDICARE

## 2018-01-01 VITALS
TEMPERATURE: 98.1 F | BODY MASS INDEX: 24.3 KG/M2 | RESPIRATION RATE: 16 BRPM | DIASTOLIC BLOOD PRESSURE: 52 MMHG | HEART RATE: 63 BPM | SYSTOLIC BLOOD PRESSURE: 130 MMHG | OXYGEN SATURATION: 97 % | HEIGHT: 66 IN | WEIGHT: 151.19 LBS

## 2018-01-01 VITALS
BODY MASS INDEX: 21.94 KG/M2 | HEART RATE: 70 BPM | WEIGHT: 153.22 LBS | HEIGHT: 70 IN | TEMPERATURE: 97.9 F | SYSTOLIC BLOOD PRESSURE: 71 MMHG | OXYGEN SATURATION: 92 % | RESPIRATION RATE: 29 BRPM | DIASTOLIC BLOOD PRESSURE: 38 MMHG

## 2018-01-01 VITALS
HEIGHT: 65 IN | OXYGEN SATURATION: 96 % | SYSTOLIC BLOOD PRESSURE: 103 MMHG | TEMPERATURE: 98.3 F | RESPIRATION RATE: 20 BRPM | DIASTOLIC BLOOD PRESSURE: 51 MMHG | BODY MASS INDEX: 24.21 KG/M2 | WEIGHT: 145.28 LBS | HEART RATE: 69 BPM

## 2018-01-01 VITALS
WEIGHT: 149.5 LBS | BODY MASS INDEX: 22.66 KG/M2 | OXYGEN SATURATION: 98 % | RESPIRATION RATE: 16 BRPM | TEMPERATURE: 98.6 F | HEIGHT: 68 IN | HEART RATE: 72 BPM | SYSTOLIC BLOOD PRESSURE: 140 MMHG | DIASTOLIC BLOOD PRESSURE: 62 MMHG

## 2018-01-01 VITALS
SYSTOLIC BLOOD PRESSURE: 120 MMHG | RESPIRATION RATE: 16 BRPM | DIASTOLIC BLOOD PRESSURE: 78 MMHG | HEART RATE: 70 BPM | WEIGHT: 153 LBS | HEIGHT: 68 IN | BODY MASS INDEX: 23.19 KG/M2

## 2018-01-01 VITALS
HEART RATE: 68 BPM | WEIGHT: 150 LBS | HEIGHT: 68 IN | DIASTOLIC BLOOD PRESSURE: 52 MMHG | RESPIRATION RATE: 16 BRPM | BODY MASS INDEX: 22.73 KG/M2 | SYSTOLIC BLOOD PRESSURE: 128 MMHG

## 2018-01-01 VITALS
BODY MASS INDEX: 23.3 KG/M2 | WEIGHT: 153.25 LBS | DIASTOLIC BLOOD PRESSURE: 80 MMHG | HEART RATE: 63 BPM | SYSTOLIC BLOOD PRESSURE: 118 MMHG | RESPIRATION RATE: 15 BRPM

## 2018-01-01 VITALS
RESPIRATION RATE: 16 BRPM | WEIGHT: 154 LBS | HEIGHT: 68 IN | BODY MASS INDEX: 23.34 KG/M2 | SYSTOLIC BLOOD PRESSURE: 120 MMHG | DIASTOLIC BLOOD PRESSURE: 78 MMHG | HEART RATE: 62 BPM

## 2018-01-01 VITALS
SYSTOLIC BLOOD PRESSURE: 122 MMHG | DIASTOLIC BLOOD PRESSURE: 72 MMHG | WEIGHT: 150.13 LBS | BODY MASS INDEX: 22.75 KG/M2 | HEART RATE: 76 BPM | HEIGHT: 68 IN | TEMPERATURE: 96.6 F | OXYGEN SATURATION: 97 % | RESPIRATION RATE: 16 BRPM

## 2018-01-01 VITALS
WEIGHT: 151 LBS | HEIGHT: 68 IN | HEART RATE: 62 BPM | DIASTOLIC BLOOD PRESSURE: 72 MMHG | BODY MASS INDEX: 22.88 KG/M2 | SYSTOLIC BLOOD PRESSURE: 122 MMHG

## 2018-01-01 VITALS
BODY MASS INDEX: 22.76 KG/M2 | TEMPERATURE: 97.7 F | SYSTOLIC BLOOD PRESSURE: 122 MMHG | HEIGHT: 68 IN | HEART RATE: 63 BPM | OXYGEN SATURATION: 97 % | DIASTOLIC BLOOD PRESSURE: 100 MMHG | RESPIRATION RATE: 16 BRPM | WEIGHT: 150.19 LBS

## 2018-01-01 VITALS
BODY MASS INDEX: 23.13 KG/M2 | WEIGHT: 152.13 LBS | HEART RATE: 60 BPM | DIASTOLIC BLOOD PRESSURE: 82 MMHG | SYSTOLIC BLOOD PRESSURE: 128 MMHG

## 2018-01-01 DIAGNOSIS — N18.6 END STAGE RENAL DISEASE (HCC): ICD-10-CM

## 2018-01-01 DIAGNOSIS — R57.9 SHOCK (HCC): ICD-10-CM

## 2018-01-01 DIAGNOSIS — R74.01 TRANSAMINITIS: ICD-10-CM

## 2018-01-01 DIAGNOSIS — N18.6 END STAGE CHRONIC KIDNEY DISEASE (HCC): Primary | ICD-10-CM

## 2018-01-01 DIAGNOSIS — G93.40 ACUTE ENCEPHALOPATHY: ICD-10-CM

## 2018-01-01 DIAGNOSIS — I21.4 NSTEMI (NON-ST ELEVATED MYOCARDIAL INFARCTION) (HCC): ICD-10-CM

## 2018-01-01 DIAGNOSIS — I35.0 AORTIC STENOSIS: ICD-10-CM

## 2018-01-01 DIAGNOSIS — K72.00 ACUTE LIVER FAILURE: Primary | ICD-10-CM

## 2018-01-01 LAB
ALBUMIN SERPL BCP-MCNC: 2.6 G/DL (ref 3.5–5)
ALP SERPL-CCNC: 207 U/L (ref 46–116)
ALT SERPL W P-5'-P-CCNC: 292 U/L (ref 12–78)
AMMONIA PLAS-SCNC: 22 UMOL/L (ref 11–35)
ANION GAP SERPL CALCULATED.3IONS-SCNC: 12 MMOL/L (ref 4–13)
ANION GAP SERPL CALCULATED.3IONS-SCNC: 13 MMOL/L (ref 4–13)
ANION GAP SERPL CALCULATED.3IONS-SCNC: 14 MMOL/L (ref 4–13)
ANION GAP SERPL CALCULATED.3IONS-SCNC: 15 MMOL/L (ref 4–13)
ANION GAP SERPL CALCULATED.3IONS-SCNC: 16 MMOL/L (ref 4–13)
ANION GAP SERPL CALCULATED.3IONS-SCNC: 17 MMOL/L (ref 4–13)
ANION GAP SERPL CALCULATED.3IONS-SCNC: 24 MMOL/L (ref 4–13)
ANISOCYTOSIS BLD QL SMEAR: PRESENT
APTT PPP: 68 SECONDS (ref 23–35)
AST SERPL W P-5'-P-CCNC: 502 U/L (ref 5–45)
ATRIAL RATE: 66 BPM
BASOPHILS # BLD AUTO: 0.01 THOUSANDS/ΜL (ref 0–0.1)
BASOPHILS # BLD MANUAL: 0 THOUSAND/UL (ref 0–0.1)
BASOPHILS NFR BLD AUTO: 0 % (ref 0–1)
BASOPHILS NFR MAR MANUAL: 0 % (ref 0–1)
BILIRUB SERPL-MCNC: 3.21 MG/DL (ref 0.2–1)
BUN SERPL-MCNC: 40 MG/DL (ref 5–25)
BUN SERPL-MCNC: 51 MG/DL (ref 5–25)
BUN SERPL-MCNC: 52 MG/DL (ref 5–25)
BUN SERPL-MCNC: 52 MG/DL (ref 5–25)
BUN SERPL-MCNC: 62 MG/DL (ref 5–25)
BUN SERPL-MCNC: 63 MG/DL (ref 5–25)
BUN SERPL-MCNC: 73 MG/DL (ref 5–25)
BURR CELLS BLD QL SMEAR: PRESENT
CA-I BLD-SCNC: 0.97 MMOL/L (ref 1.12–1.32)
CALCIUM SERPL-MCNC: 8 MG/DL (ref 8.3–10.1)
CALCIUM SERPL-MCNC: 8.1 MG/DL (ref 8.3–10.1)
CALCIUM SERPL-MCNC: 8.3 MG/DL (ref 8.3–10.1)
CALCIUM SERPL-MCNC: 8.3 MG/DL (ref 8.3–10.1)
CALCIUM SERPL-MCNC: 8.7 MG/DL (ref 8.3–10.1)
CHLORIDE SERPL-SCNC: 93 MMOL/L (ref 100–108)
CHLORIDE SERPL-SCNC: 95 MMOL/L (ref 100–108)
CHLORIDE SERPL-SCNC: 95 MMOL/L (ref 100–108)
CHLORIDE SERPL-SCNC: 96 MMOL/L (ref 100–108)
CHLORIDE SERPL-SCNC: 96 MMOL/L (ref 100–108)
CHLORIDE SERPL-SCNC: 97 MMOL/L (ref 100–108)
CHLORIDE SERPL-SCNC: 98 MMOL/L (ref 100–108)
CO2 SERPL-SCNC: 18 MMOL/L (ref 21–32)
CO2 SERPL-SCNC: 24 MMOL/L (ref 21–32)
CO2 SERPL-SCNC: 25 MMOL/L (ref 21–32)
CO2 SERPL-SCNC: 26 MMOL/L (ref 21–32)
CO2 SERPL-SCNC: 27 MMOL/L (ref 21–32)
CREAT SERPL-MCNC: 4.31 MG/DL (ref 0.6–1.3)
CREAT SERPL-MCNC: 4.52 MG/DL (ref 0.6–1.3)
CREAT SERPL-MCNC: 5.7 MG/DL (ref 0.6–1.3)
CREAT SERPL-MCNC: 5.99 MG/DL (ref 0.6–1.3)
CREAT SERPL-MCNC: 6.17 MG/DL (ref 0.6–1.3)
CREAT SERPL-MCNC: 6.26 MG/DL (ref 0.6–1.3)
CREAT SERPL-MCNC: 6.97 MG/DL (ref 0.6–1.3)
EOSINOPHIL # BLD AUTO: 0 THOUSAND/ΜL (ref 0–0.61)
EOSINOPHIL # BLD MANUAL: 0 THOUSAND/UL (ref 0–0.4)
EOSINOPHIL NFR BLD AUTO: 0 % (ref 0–6)
EOSINOPHIL NFR BLD MANUAL: 0 % (ref 0–6)
ERYTHROCYTE [DISTWIDTH] IN BLOOD BY AUTOMATED COUNT: 15.1 % (ref 11.6–15.1)
ERYTHROCYTE [DISTWIDTH] IN BLOOD BY AUTOMATED COUNT: 15.1 % (ref 11.6–15.1)
ERYTHROCYTE [DISTWIDTH] IN BLOOD BY AUTOMATED COUNT: 15.6 % (ref 11.6–15.1)
ERYTHROCYTE [DISTWIDTH] IN BLOOD BY AUTOMATED COUNT: 18.1 % (ref 11.6–15.1)
ERYTHROCYTE [DISTWIDTH] IN BLOOD BY AUTOMATED COUNT: 18.9 % (ref 11.6–15.1)
GFR SERPL CREATININE-BSD FRML MDRD: 11 ML/MIN/1.73SQ M
GFR SERPL CREATININE-BSD FRML MDRD: 11 ML/MIN/1.73SQ M
GFR SERPL CREATININE-BSD FRML MDRD: 6 ML/MIN/1.73SQ M
GFR SERPL CREATININE-BSD FRML MDRD: 7 ML/MIN/1.73SQ M
GFR SERPL CREATININE-BSD FRML MDRD: 7 ML/MIN/1.73SQ M
GFR SERPL CREATININE-BSD FRML MDRD: 8 ML/MIN/1.73SQ M
GFR SERPL CREATININE-BSD FRML MDRD: 8 ML/MIN/1.73SQ M
GLUCOSE SERPL-MCNC: 101 MG/DL (ref 65–140)
GLUCOSE SERPL-MCNC: 101 MG/DL (ref 65–140)
GLUCOSE SERPL-MCNC: 105 MG/DL (ref 65–140)
GLUCOSE SERPL-MCNC: 110 MG/DL (ref 65–140)
GLUCOSE SERPL-MCNC: 113 MG/DL (ref 65–140)
GLUCOSE SERPL-MCNC: 153 MG/DL (ref 65–140)
GLUCOSE SERPL-MCNC: 81 MG/DL (ref 65–140)
HAV IGM SER QL: NORMAL
HBV CORE IGM SER QL: NORMAL
HBV SURFACE AG SER QL: NORMAL
HCT VFR BLD AUTO: 32.9 % (ref 36.5–49.3)
HCT VFR BLD AUTO: 34.2 % (ref 36.5–49.3)
HCT VFR BLD AUTO: 36.3 % (ref 36.5–49.3)
HCT VFR BLD AUTO: 40.5 % (ref 36.5–49.3)
HCT VFR BLD AUTO: 43.5 % (ref 36.5–49.3)
HCV AB SER QL: NORMAL
HGB BLD-MCNC: 10.5 G/DL (ref 12–17)
HGB BLD-MCNC: 10.9 G/DL (ref 12–17)
HGB BLD-MCNC: 11.3 G/DL (ref 12–17)
HGB BLD-MCNC: 12.7 G/DL (ref 12–17)
HGB BLD-MCNC: 13.2 G/DL (ref 12–17)
INR PPP: 2.36 (ref 0.86–1.16)
LACTATE SERPL-SCNC: 5.7 MMOL/L (ref 0.5–2)
LACTATE SERPL-SCNC: 6.3 MMOL/L (ref 0.5–2)
LACTATE SERPL-SCNC: 8.2 MMOL/L (ref 0.5–2)
LIPASE SERPL-CCNC: 111 U/L (ref 73–393)
LYMPHOCYTES # BLD AUTO: 0.31 THOUSAND/UL (ref 0.6–4.47)
LYMPHOCYTES # BLD AUTO: 0.43 THOUSANDS/ΜL (ref 0.6–4.47)
LYMPHOCYTES # BLD AUTO: 2 % (ref 14–44)
LYMPHOCYTES NFR BLD AUTO: 4 % (ref 14–44)
MAGNESIUM SERPL-MCNC: 2.4 MG/DL (ref 1.6–2.6)
MCH RBC QN AUTO: 29.6 PG (ref 26.8–34.3)
MCH RBC QN AUTO: 29.9 PG (ref 26.8–34.3)
MCH RBC QN AUTO: 29.9 PG (ref 26.8–34.3)
MCH RBC QN AUTO: 30.2 PG (ref 26.8–34.3)
MCH RBC QN AUTO: 30.3 PG (ref 26.8–34.3)
MCHC RBC AUTO-ENTMCNC: 30.3 G/DL (ref 31.4–37.4)
MCHC RBC AUTO-ENTMCNC: 31.1 G/DL (ref 31.4–37.4)
MCHC RBC AUTO-ENTMCNC: 31.4 G/DL (ref 31.4–37.4)
MCHC RBC AUTO-ENTMCNC: 31.9 G/DL (ref 31.4–37.4)
MCHC RBC AUTO-ENTMCNC: 31.9 G/DL (ref 31.4–37.4)
MCV RBC AUTO: 95 FL (ref 82–98)
MCV RBC AUTO: 99 FL (ref 82–98)
MONOCYTES # BLD AUTO: 0.77 THOUSAND/UL (ref 0–1.22)
MONOCYTES # BLD AUTO: 1.11 THOUSAND/ΜL (ref 0.17–1.22)
MONOCYTES NFR BLD AUTO: 9 % (ref 4–12)
MONOCYTES NFR BLD: 5 % (ref 4–12)
NEUTROPHILS # BLD AUTO: 10.84 THOUSANDS/ΜL (ref 1.85–7.62)
NEUTROPHILS # BLD MANUAL: 14.41 THOUSAND/UL (ref 1.85–7.62)
NEUTS BAND NFR BLD MANUAL: 9 % (ref 0–8)
NEUTS SEG NFR BLD AUTO: 84 % (ref 43–75)
NEUTS SEG NFR BLD AUTO: 87 % (ref 43–75)
NRBC BLD AUTO-RTO: 1 /100 WBC (ref 0–2)
NRBC BLD AUTO-RTO: 2 /100 WBCS
NRBC BLD AUTO-RTO: 2 /100 WBCS
OVALOCYTES BLD QL SMEAR: PRESENT
P AXIS: 94 DEGREES
PHOSPHATE SERPL-MCNC: 4.4 MG/DL (ref 2.3–4.1)
PHOSPHATE SERPL-MCNC: 6.9 MG/DL (ref 2.3–4.1)
PLATELET # BLD AUTO: 108 THOUSANDS/UL (ref 149–390)
PLATELET # BLD AUTO: 132 THOUSANDS/UL (ref 149–390)
PLATELET # BLD AUTO: 148 THOUSANDS/UL (ref 149–390)
PLATELET # BLD AUTO: 161 THOUSANDS/UL (ref 149–390)
PLATELET # BLD AUTO: 85 THOUSANDS/UL (ref 149–390)
PLATELET # BLD AUTO: 89 THOUSANDS/UL (ref 149–390)
PLATELET BLD QL SMEAR: ABNORMAL
PMV BLD AUTO: 12.1 FL (ref 8.9–12.7)
PMV BLD AUTO: 12.2 FL (ref 8.9–12.7)
PMV BLD AUTO: 12.4 FL (ref 8.9–12.7)
PMV BLD AUTO: 12.4 FL (ref 8.9–12.7)
PMV BLD AUTO: 12.5 FL (ref 8.9–12.7)
PMV BLD AUTO: 12.5 FL (ref 8.9–12.7)
POLYCHROMASIA BLD QL SMEAR: PRESENT
POTASSIUM SERPL-SCNC: 4.1 MMOL/L (ref 3.5–5.3)
POTASSIUM SERPL-SCNC: 4.3 MMOL/L (ref 3.5–5.3)
POTASSIUM SERPL-SCNC: 4.5 MMOL/L (ref 3.5–5.3)
POTASSIUM SERPL-SCNC: 4.6 MMOL/L (ref 3.5–5.3)
POTASSIUM SERPL-SCNC: 4.9 MMOL/L (ref 3.5–5.3)
POTASSIUM SERPL-SCNC: 5.5 MMOL/L (ref 3.5–5.3)
POTASSIUM SERPL-SCNC: 5.7 MMOL/L (ref 3.5–5.3)
PR INTERVAL: 172 MS
PROT SERPL-MCNC: 6.5 G/DL (ref 6.4–8.2)
PROTHROMBIN TIME: 26.1 SECONDS (ref 12.1–14.4)
QRS AXIS: 104 DEGREES
QRSD INTERVAL: 100 MS
QT INTERVAL: 418 MS
QTC INTERVAL: 438 MS
RBC # BLD AUTO: 3.46 MILLION/UL (ref 3.88–5.62)
RBC # BLD AUTO: 3.61 MILLION/UL (ref 3.88–5.62)
RBC # BLD AUTO: 3.82 MILLION/UL (ref 3.88–5.62)
RBC # BLD AUTO: 4.25 MILLION/UL (ref 3.88–5.62)
RBC # BLD AUTO: 4.41 MILLION/UL (ref 3.88–5.62)
SODIUM SERPL-SCNC: 133 MMOL/L (ref 136–145)
SODIUM SERPL-SCNC: 134 MMOL/L (ref 136–145)
SODIUM SERPL-SCNC: 135 MMOL/L (ref 136–145)
SODIUM SERPL-SCNC: 135 MMOL/L (ref 136–145)
SODIUM SERPL-SCNC: 137 MMOL/L (ref 136–145)
SODIUM SERPL-SCNC: 138 MMOL/L (ref 136–145)
SODIUM SERPL-SCNC: 139 MMOL/L (ref 136–145)
SPECIMEN SOURCE: ABNORMAL
T WAVE AXIS: -85 DEGREES
TOTAL CELLS COUNTED SPEC: 100
TROPONIN I BLD-MCNC: 0.79 NG/ML (ref 0–0.08)
TROPONIN I SERPL-MCNC: 0.86 NG/ML
TROPONIN I SERPL-MCNC: 0.95 NG/ML
TROPONIN I SERPL-MCNC: 0.98 NG/ML
VENTRICULAR RATE: 66 BPM
WBC # BLD AUTO: 10 THOUSAND/UL (ref 4.31–10.16)
WBC # BLD AUTO: 10.13 THOUSAND/UL (ref 4.31–10.16)
WBC # BLD AUTO: 12.39 THOUSAND/UL (ref 4.31–10.16)
WBC # BLD AUTO: 15.49 THOUSAND/UL (ref 4.31–10.16)
WBC # BLD AUTO: 8.65 THOUSAND/UL (ref 4.31–10.16)

## 2018-01-01 PROCEDURE — 85027 COMPLETE CBC AUTOMATED: CPT | Performed by: INTERNAL MEDICINE

## 2018-01-01 PROCEDURE — 85025 COMPLETE CBC W/AUTO DIFF WBC: CPT | Performed by: EMERGENCY MEDICINE

## 2018-01-01 PROCEDURE — 80048 BASIC METABOLIC PNL TOTAL CA: CPT | Performed by: INTERNAL MEDICINE

## 2018-01-01 PROCEDURE — 77001 FLUOROGUIDE FOR VEIN DEVICE: CPT

## 2018-01-01 PROCEDURE — G8987 SELF CARE CURRENT STATUS: HCPCS

## 2018-01-01 PROCEDURE — 83690 ASSAY OF LIPASE: CPT | Performed by: NURSE PRACTITIONER

## 2018-01-01 PROCEDURE — 5A1D70Z PERFORMANCE OF URINARY FILTRATION, INTERMITTENT, LESS THAN 6 HOURS PER DAY: ICD-10-PCS | Performed by: INTERNAL MEDICINE

## 2018-01-01 PROCEDURE — 99152 MOD SED SAME PHYS/QHP 5/>YRS: CPT

## 2018-01-01 PROCEDURE — 80048 BASIC METABOLIC PNL TOTAL CA: CPT | Performed by: NURSE PRACTITIONER

## 2018-01-01 PROCEDURE — 99153 MOD SED SAME PHYS/QHP EA: CPT

## 2018-01-01 PROCEDURE — 99291 CRITICAL CARE FIRST HOUR: CPT

## 2018-01-01 PROCEDURE — 83605 ASSAY OF LACTIC ACID: CPT | Performed by: EMERGENCY MEDICINE

## 2018-01-01 PROCEDURE — 82140 ASSAY OF AMMONIA: CPT | Performed by: NURSE PRACTITIONER

## 2018-01-01 PROCEDURE — 84484 ASSAY OF TROPONIN QUANT: CPT | Performed by: NURSE PRACTITIONER

## 2018-01-01 PROCEDURE — 36558 INSERT TUNNELED CV CATH: CPT

## 2018-01-01 PROCEDURE — 85610 PROTHROMBIN TIME: CPT | Performed by: EMERGENCY MEDICINE

## 2018-01-01 PROCEDURE — 84100 ASSAY OF PHOSPHORUS: CPT | Performed by: INTERNAL MEDICINE

## 2018-01-01 PROCEDURE — G8978 MOBILITY CURRENT STATUS: HCPCS

## 2018-01-01 PROCEDURE — 97166 OT EVAL MOD COMPLEX 45 MIN: CPT

## 2018-01-01 PROCEDURE — 71045 X-RAY EXAM CHEST 1 VIEW: CPT

## 2018-01-01 PROCEDURE — 96360 HYDRATION IV INFUSION INIT: CPT

## 2018-01-01 PROCEDURE — 74018 RADEX ABDOMEN 1 VIEW: CPT

## 2018-01-01 PROCEDURE — 99238 HOSP IP/OBS DSCHRG MGMT 30/<: CPT | Performed by: NURSE PRACTITIONER

## 2018-01-01 PROCEDURE — G8988 SELF CARE GOAL STATUS: HCPCS

## 2018-01-01 PROCEDURE — 97163 PT EVAL HIGH COMPLEX 45 MIN: CPT

## 2018-01-01 PROCEDURE — 85049 AUTOMATED PLATELET COUNT: CPT | Performed by: NURSE PRACTITIONER

## 2018-01-01 PROCEDURE — 74176 CT ABD & PELVIS W/O CONTRAST: CPT

## 2018-01-01 PROCEDURE — 80048 BASIC METABOLIC PNL TOTAL CA: CPT | Performed by: PHYSICIAN ASSISTANT

## 2018-01-01 PROCEDURE — 99233 SBSQ HOSP IP/OBS HIGH 50: CPT | Performed by: INTERNAL MEDICINE

## 2018-01-01 PROCEDURE — 02HV33Z INSERTION OF INFUSION DEVICE INTO SUPERIOR VENA CAVA, PERCUTANEOUS APPROACH: ICD-10-PCS | Performed by: RADIOLOGY

## 2018-01-01 PROCEDURE — 83735 ASSAY OF MAGNESIUM: CPT | Performed by: NURSE PRACTITIONER

## 2018-01-01 PROCEDURE — 70450 CT HEAD/BRAIN W/O DYE: CPT

## 2018-01-01 PROCEDURE — 85027 COMPLETE CBC AUTOMATED: CPT | Performed by: NURSE PRACTITIONER

## 2018-01-01 PROCEDURE — 84484 ASSAY OF TROPONIN QUANT: CPT

## 2018-01-01 PROCEDURE — 83605 ASSAY OF LACTIC ACID: CPT | Performed by: NURSE PRACTITIONER

## 2018-01-01 PROCEDURE — 93005 ELECTROCARDIOGRAM TRACING: CPT | Performed by: EMERGENCY MEDICINE

## 2018-01-01 PROCEDURE — 85007 BL SMEAR W/DIFF WBC COUNT: CPT | Performed by: NURSE PRACTITIONER

## 2018-01-01 PROCEDURE — 87040 BLOOD CULTURE FOR BACTERIA: CPT | Performed by: NURSE PRACTITIONER

## 2018-01-01 PROCEDURE — 84484 ASSAY OF TROPONIN QUANT: CPT | Performed by: EMERGENCY MEDICINE

## 2018-01-01 PROCEDURE — 84100 ASSAY OF PHOSPHORUS: CPT | Performed by: PHYSICIAN ASSISTANT

## 2018-01-01 PROCEDURE — G8979 MOBILITY GOAL STATUS: HCPCS

## 2018-01-01 PROCEDURE — 82330 ASSAY OF CALCIUM: CPT | Performed by: NURSE PRACTITIONER

## 2018-01-01 PROCEDURE — 0JH63XZ INSERTION OF TUNNELED VASCULAR ACCESS DEVICE INTO CHEST SUBCUTANEOUS TISSUE AND FASCIA, PERCUTANEOUS APPROACH: ICD-10-PCS | Performed by: RADIOLOGY

## 2018-01-01 PROCEDURE — 85730 THROMBOPLASTIN TIME PARTIAL: CPT | Performed by: EMERGENCY MEDICINE

## 2018-01-01 PROCEDURE — 99232 SBSQ HOSP IP/OBS MODERATE 35: CPT | Performed by: INTERNAL MEDICINE

## 2018-01-01 PROCEDURE — 80053 COMPREHEN METABOLIC PANEL: CPT | Performed by: EMERGENCY MEDICINE

## 2018-01-01 PROCEDURE — 36415 COLL VENOUS BLD VENIPUNCTURE: CPT

## 2018-01-01 PROCEDURE — 80074 ACUTE HEPATITIS PANEL: CPT | Performed by: NURSE PRACTITIONER

## 2018-01-01 RX ORDER — FENTANYL CITRATE 50 UG/ML
INJECTION, SOLUTION INTRAMUSCULAR; INTRAVENOUS CODE/TRAUMA/SEDATION MEDICATION
Status: COMPLETED | OUTPATIENT
Start: 2018-01-01 | End: 2018-01-01

## 2018-01-01 RX ORDER — TEMAZEPAM 7.5 MG/1
7.5 CAPSULE ORAL
Status: DISCONTINUED | OUTPATIENT
Start: 2018-01-01 | End: 2018-01-01 | Stop reason: HOSPADM

## 2018-01-01 RX ORDER — PANTOPRAZOLE SODIUM 40 MG/1
40 TABLET, DELAYED RELEASE ORAL
Status: DISCONTINUED | OUTPATIENT
Start: 2018-01-01 | End: 2018-01-01 | Stop reason: HOSPADM

## 2018-01-01 RX ORDER — CALCITRIOL 0.25 UG/1
0.25 CAPSULE, LIQUID FILLED ORAL DAILY
COMMUNITY
End: 2018-01-01 | Stop reason: HOSPADM

## 2018-01-01 RX ORDER — ALBUMIN, HUMAN INJ 5% 5 %
12.5 SOLUTION INTRAVENOUS ONCE
Status: DISCONTINUED | OUTPATIENT
Start: 2018-01-01 | End: 2018-01-01

## 2018-01-01 RX ORDER — IRON POLYSACCHARIDE COMPLEX 150 MG
150 CAPSULE ORAL 2 TIMES DAILY
COMMUNITY
End: 2018-01-01 | Stop reason: HOSPADM

## 2018-01-01 RX ORDER — MIDAZOLAM HYDROCHLORIDE 1 MG/ML
INJECTION INTRAMUSCULAR; INTRAVENOUS CODE/TRAUMA/SEDATION MEDICATION
Status: COMPLETED | OUTPATIENT
Start: 2018-01-01 | End: 2018-01-01

## 2018-01-01 RX ORDER — FUROSEMIDE 10 MG/ML
80 INJECTION INTRAMUSCULAR; INTRAVENOUS
Status: DISCONTINUED | OUTPATIENT
Start: 2018-01-01 | End: 2018-01-01

## 2018-01-01 RX ORDER — VANCOMYCIN HYDROCHLORIDE 1 G/200ML
15 INJECTION, SOLUTION INTRAVENOUS ONCE
Status: COMPLETED | OUTPATIENT
Start: 2018-01-01 | End: 2018-01-01

## 2018-01-01 RX ORDER — CALCITRIOL 0.25 UG/1
0.25 CAPSULE, LIQUID FILLED ORAL DAILY
Status: DISCONTINUED | OUTPATIENT
Start: 2018-01-01 | End: 2018-01-01

## 2018-01-01 RX ORDER — CARVEDILOL 3.12 MG/1
3.12 TABLET ORAL 2 TIMES DAILY WITH MEALS
COMMUNITY
End: 2018-01-01 | Stop reason: HOSPADM

## 2018-01-01 RX ORDER — POLYETHYLENE GLYCOL 3350 17 G/17G
17 POWDER, FOR SOLUTION ORAL DAILY PRN
Status: DISCONTINUED | OUTPATIENT
Start: 2018-01-01 | End: 2018-01-01 | Stop reason: HOSPADM

## 2018-01-01 RX ORDER — PANTOPRAZOLE SODIUM 40 MG/1
40 TABLET, DELAYED RELEASE ORAL DAILY
Qty: 30 TABLET | Refills: 0 | Status: SHIPPED | OUTPATIENT
Start: 2018-01-01 | End: 2018-01-01 | Stop reason: HOSPADM

## 2018-01-01 RX ORDER — DOCUSATE SODIUM 100 MG/1
100 CAPSULE, LIQUID FILLED ORAL 2 TIMES DAILY
Qty: 10 CAPSULE | Refills: 0 | Status: SHIPPED | OUTPATIENT
Start: 2018-01-01 | End: 2018-01-01 | Stop reason: HOSPADM

## 2018-01-01 RX ORDER — DOCUSATE SODIUM 100 MG/1
100 CAPSULE, LIQUID FILLED ORAL 2 TIMES DAILY
Status: DISCONTINUED | OUTPATIENT
Start: 2018-01-01 | End: 2018-01-01 | Stop reason: HOSPADM

## 2018-01-01 RX ORDER — DOBUTAMINE HYDROCHLORIDE 200 MG/100ML
2.5 INJECTION INTRAVENOUS CONTINUOUS
Status: DISCONTINUED | OUTPATIENT
Start: 2018-01-01 | End: 2018-01-01 | Stop reason: HOSPADM

## 2018-01-01 RX ORDER — FUROSEMIDE 40 MG/1
40 TABLET ORAL
Status: DISCONTINUED | OUTPATIENT
Start: 2018-01-01 | End: 2018-01-01 | Stop reason: HOSPADM

## 2018-01-01 RX ORDER — HEPARIN SODIUM 5000 [USP'U]/ML
5000 INJECTION, SOLUTION INTRAVENOUS; SUBCUTANEOUS EVERY 8 HOURS SCHEDULED
Status: DISCONTINUED | OUTPATIENT
Start: 2018-01-01 | End: 2018-01-01 | Stop reason: HOSPADM

## 2018-01-01 RX ORDER — LANOLIN ALCOHOL/MO/W.PET/CERES
6 CREAM (GRAM) TOPICAL
Status: DISCONTINUED | OUTPATIENT
Start: 2018-01-01 | End: 2018-01-01

## 2018-01-01 RX ORDER — TEMAZEPAM 7.5 MG/1
7.5 CAPSULE ORAL
Status: DISCONTINUED | OUTPATIENT
Start: 2018-01-01 | End: 2018-01-01

## 2018-01-01 RX ORDER — LEVOTHYROXINE SODIUM 0.05 MG/1
50 TABLET ORAL
Status: DISCONTINUED | OUTPATIENT
Start: 2018-01-01 | End: 2018-01-01 | Stop reason: HOSPADM

## 2018-01-01 RX ADMIN — FUROSEMIDE 80 MG: 10 INJECTION, SOLUTION INTRAMUSCULAR; INTRAVENOUS at 09:22

## 2018-01-01 RX ADMIN — HEPARIN SODIUM 5000 UNITS: 5000 INJECTION, SOLUTION INTRAVENOUS; SUBCUTANEOUS at 16:06

## 2018-01-01 RX ADMIN — PANTOPRAZOLE SODIUM 40 MG: 40 TABLET, DELAYED RELEASE ORAL at 15:10

## 2018-01-01 RX ADMIN — PANTOPRAZOLE SODIUM 40 MG: 40 TABLET, DELAYED RELEASE ORAL at 06:13

## 2018-01-01 RX ADMIN — CALCIUM GLUCONATE 1 G: 94 INJECTION, SOLUTION INTRAVENOUS at 18:20

## 2018-01-01 RX ADMIN — DOCUSATE SODIUM 100 MG: 100 CAPSULE, LIQUID FILLED ORAL at 09:10

## 2018-01-01 RX ADMIN — SODIUM CHLORIDE 1000 ML: 0.9 INJECTION, SOLUTION INTRAVENOUS at 14:35

## 2018-01-01 RX ADMIN — HEPARIN SODIUM 5000 UNITS: 5000 INJECTION, SOLUTION INTRAVENOUS; SUBCUTANEOUS at 05:07

## 2018-01-01 RX ADMIN — ASPIRIN 81 MG: 81 TABLET, COATED ORAL at 10:42

## 2018-01-01 RX ADMIN — DOXERCALCIFEROL 2 MCG: 2 INJECTION, SOLUTION INTRAVENOUS at 15:17

## 2018-01-01 RX ADMIN — HEPARIN SODIUM 5000 UNITS: 5000 INJECTION, SOLUTION INTRAVENOUS; SUBCUTANEOUS at 21:52

## 2018-01-01 RX ADMIN — METOCLOPRAMIDE 5 MG: 5 INJECTION, SOLUTION INTRAMUSCULAR; INTRAVENOUS at 16:05

## 2018-01-01 RX ADMIN — DOCUSATE SODIUM 100 MG: 100 CAPSULE, LIQUID FILLED ORAL at 17:14

## 2018-01-01 RX ADMIN — PANTOPRAZOLE SODIUM 40 MG: 40 TABLET, DELAYED RELEASE ORAL at 16:36

## 2018-01-01 RX ADMIN — TEMAZEPAM 7.5 MG: 7.5 CAPSULE ORAL at 21:31

## 2018-01-01 RX ADMIN — DOCUSATE SODIUM 100 MG: 100 CAPSULE, LIQUID FILLED ORAL at 09:01

## 2018-01-01 RX ADMIN — HEPARIN SODIUM 5000 UNITS: 5000 INJECTION, SOLUTION INTRAVENOUS; SUBCUTANEOUS at 13:45

## 2018-01-01 RX ADMIN — METOCLOPRAMIDE 5 MG: 5 INJECTION, SOLUTION INTRAMUSCULAR; INTRAVENOUS at 02:14

## 2018-01-01 RX ADMIN — ACETAMINOPHEN 650 MG: 160 SUSPENSION ORAL at 03:48

## 2018-01-01 RX ADMIN — LEVOTHYROXINE SODIUM 50 MCG: 50 TABLET ORAL at 06:14

## 2018-01-01 RX ADMIN — ACETAMINOPHEN 650 MG: 160 SUSPENSION ORAL at 03:08

## 2018-01-01 RX ADMIN — DOXERCALCIFEROL 2 MCG: 2 INJECTION, SOLUTION INTRAVENOUS at 14:33

## 2018-01-01 RX ADMIN — HEPARIN SODIUM 5000 UNITS: 5000 INJECTION, SOLUTION INTRAVENOUS; SUBCUTANEOUS at 15:30

## 2018-01-01 RX ADMIN — CARVEDILOL 3.12 MG: 3.12 TABLET, FILM COATED ORAL at 09:22

## 2018-01-01 RX ADMIN — IRON SUCROSE 100 MG: 20 INJECTION, SOLUTION INTRAVENOUS at 15:14

## 2018-01-01 RX ADMIN — METOCLOPRAMIDE 5 MG: 5 INJECTION, SOLUTION INTRAMUSCULAR; INTRAVENOUS at 16:35

## 2018-01-01 RX ADMIN — HEPARIN SODIUM 5000 UNITS: 5000 INJECTION, SOLUTION INTRAVENOUS; SUBCUTANEOUS at 07:50

## 2018-01-01 RX ADMIN — HEPARIN SODIUM 5000 UNITS: 5000 INJECTION, SOLUTION INTRAVENOUS; SUBCUTANEOUS at 06:03

## 2018-01-01 RX ADMIN — TEMAZEPAM 7.5 MG: 7.5 CAPSULE ORAL at 21:53

## 2018-01-01 RX ADMIN — HEPARIN SODIUM 5000 UNITS: 5000 INJECTION, SOLUTION INTRAVENOUS; SUBCUTANEOUS at 06:24

## 2018-01-01 RX ADMIN — CEFEPIME HYDROCHLORIDE 1000 MG: 1 INJECTION, SOLUTION INTRAVENOUS at 18:18

## 2018-01-01 RX ADMIN — ONDANSETRON 4 MG: 2 INJECTION INTRAMUSCULAR; INTRAVENOUS at 11:37

## 2018-01-01 RX ADMIN — DOCUSATE SODIUM 100 MG: 100 CAPSULE, LIQUID FILLED ORAL at 09:48

## 2018-01-01 RX ADMIN — PANTOPRAZOLE SODIUM 40 MG: 40 TABLET, DELAYED RELEASE ORAL at 06:14

## 2018-01-01 RX ADMIN — ASPIRIN 81 MG: 81 TABLET, COATED ORAL at 09:59

## 2018-01-01 RX ADMIN — ASPIRIN 81 MG: 81 TABLET, COATED ORAL at 09:22

## 2018-01-01 RX ADMIN — SODIUM CHLORIDE 1000 ML: 0.9 INJECTION, SOLUTION INTRAVENOUS at 16:57

## 2018-01-01 RX ADMIN — IRON SUCROSE 100 MG: 20 INJECTION, SOLUTION INTRAVENOUS at 13:26

## 2018-01-01 RX ADMIN — DOCUSATE SODIUM 100 MG: 100 CAPSULE, LIQUID FILLED ORAL at 08:00

## 2018-01-01 RX ADMIN — PANTOPRAZOLE SODIUM 40 MG: 40 TABLET, DELAYED RELEASE ORAL at 06:04

## 2018-01-01 RX ADMIN — HEPARIN SODIUM 5000 UNITS: 5000 INJECTION, SOLUTION INTRAVENOUS; SUBCUTANEOUS at 06:02

## 2018-01-01 RX ADMIN — SODIUM CHLORIDE 1000 ML: 0.9 INJECTION, SOLUTION INTRAVENOUS at 17:55

## 2018-01-01 RX ADMIN — HEPARIN SODIUM 5000 UNITS: 5000 INJECTION, SOLUTION INTRAVENOUS; SUBCUTANEOUS at 21:25

## 2018-01-01 RX ADMIN — POLYETHYLENE GLYCOL 3350 17 G: 17 POWDER, FOR SOLUTION ORAL at 17:22

## 2018-01-01 RX ADMIN — PANTOPRAZOLE SODIUM 40 MG: 40 TABLET, DELAYED RELEASE ORAL at 16:07

## 2018-01-01 RX ADMIN — VANCOMYCIN HYDROCHLORIDE 1000 MG: 1 INJECTION, SOLUTION INTRAVENOUS at 18:15

## 2018-01-01 RX ADMIN — LEVOTHYROXINE SODIUM 50 MCG: 50 TABLET ORAL at 07:50

## 2018-01-01 RX ADMIN — FENTANYL CITRATE 50 MCG: 50 INJECTION, SOLUTION INTRAMUSCULAR; INTRAVENOUS at 09:50

## 2018-01-01 RX ADMIN — PANTOPRAZOLE SODIUM 40 MG: 40 TABLET, DELAYED RELEASE ORAL at 15:54

## 2018-01-01 RX ADMIN — FUROSEMIDE 40 MG: 40 TABLET ORAL at 09:00

## 2018-01-01 RX ADMIN — HEPARIN SODIUM 5000 UNITS: 5000 INJECTION, SOLUTION INTRAVENOUS; SUBCUTANEOUS at 06:15

## 2018-01-01 RX ADMIN — MELATONIN TAB 3 MG 6 MG: 3 TAB at 21:25

## 2018-01-01 RX ADMIN — ONDANSETRON 4 MG: 2 INJECTION INTRAMUSCULAR; INTRAVENOUS at 11:42

## 2018-01-01 RX ADMIN — LEVOTHYROXINE SODIUM 50 MCG: 50 TABLET ORAL at 06:13

## 2018-01-01 RX ADMIN — DOCUSATE SODIUM 100 MG: 100 CAPSULE, LIQUID FILLED ORAL at 18:36

## 2018-01-01 RX ADMIN — HEPARIN SODIUM 5000 UNITS: 5000 INJECTION, SOLUTION INTRAVENOUS; SUBCUTANEOUS at 06:14

## 2018-01-01 RX ADMIN — HEPARIN SODIUM 5000 UNITS: 5000 INJECTION, SOLUTION INTRAVENOUS; SUBCUTANEOUS at 21:45

## 2018-01-01 RX ADMIN — HEPARIN SODIUM 5000 UNITS: 5000 INJECTION, SOLUTION INTRAVENOUS; SUBCUTANEOUS at 21:31

## 2018-01-01 RX ADMIN — DOCUSATE SODIUM 100 MG: 100 CAPSULE, LIQUID FILLED ORAL at 21:53

## 2018-01-01 RX ADMIN — DOCUSATE SODIUM 100 MG: 100 CAPSULE, LIQUID FILLED ORAL at 21:45

## 2018-01-01 RX ADMIN — PANTOPRAZOLE SODIUM 40 MG: 40 TABLET, DELAYED RELEASE ORAL at 06:16

## 2018-01-01 RX ADMIN — HEPARIN SODIUM 5000 UNITS: 5000 INJECTION, SOLUTION INTRAVENOUS; SUBCUTANEOUS at 06:32

## 2018-01-01 RX ADMIN — DOCUSATE SODIUM 100 MG: 100 CAPSULE, LIQUID FILLED ORAL at 13:30

## 2018-01-01 RX ADMIN — HEPARIN SODIUM 5000 UNITS: 5000 INJECTION, SOLUTION INTRAVENOUS; SUBCUTANEOUS at 22:28

## 2018-01-01 RX ADMIN — HEPARIN SODIUM 5000 UNITS: 5000 INJECTION, SOLUTION INTRAVENOUS; SUBCUTANEOUS at 13:51

## 2018-01-01 RX ADMIN — DOCUSATE SODIUM 100 MG: 100 CAPSULE, LIQUID FILLED ORAL at 17:27

## 2018-01-01 RX ADMIN — TEMAZEPAM 7.5 MG: 7.5 CAPSULE ORAL at 21:30

## 2018-01-01 RX ADMIN — DOCUSATE SODIUM 100 MG: 100 CAPSULE, LIQUID FILLED ORAL at 10:42

## 2018-01-01 RX ADMIN — ASPIRIN 81 MG: 81 TABLET, COATED ORAL at 09:01

## 2018-01-01 RX ADMIN — DOCUSATE SODIUM 100 MG: 100 CAPSULE, LIQUID FILLED ORAL at 17:21

## 2018-01-01 RX ADMIN — LEVOTHYROXINE SODIUM 50 MCG: 50 TABLET ORAL at 06:03

## 2018-01-01 RX ADMIN — ASPIRIN 81 MG: 81 TABLET, COATED ORAL at 08:41

## 2018-01-01 RX ADMIN — HEPARIN SODIUM 5000 UNITS: 5000 INJECTION, SOLUTION INTRAVENOUS; SUBCUTANEOUS at 06:13

## 2018-01-01 RX ADMIN — PANTOPRAZOLE SODIUM 40 MG: 40 TABLET, DELAYED RELEASE ORAL at 06:24

## 2018-01-01 RX ADMIN — ASPIRIN 81 MG: 81 TABLET, COATED ORAL at 08:00

## 2018-01-01 RX ADMIN — ACETAMINOPHEN 650 MG: 160 SUSPENSION ORAL at 05:13

## 2018-01-01 RX ADMIN — ASPIRIN 81 MG: 81 TABLET, COATED ORAL at 09:48

## 2018-01-01 RX ADMIN — PANTOPRAZOLE SODIUM 40 MG: 40 TABLET, DELAYED RELEASE ORAL at 07:50

## 2018-01-01 RX ADMIN — DOCUSATE SODIUM 100 MG: 100 CAPSULE, LIQUID FILLED ORAL at 09:22

## 2018-01-01 RX ADMIN — HEPARIN SODIUM 5000 UNITS: 5000 INJECTION, SOLUTION INTRAVENOUS; SUBCUTANEOUS at 21:30

## 2018-01-01 RX ADMIN — MIDAZOLAM 0.5 MG: 1 INJECTION INTRAMUSCULAR; INTRAVENOUS at 09:50

## 2018-01-01 RX ADMIN — PANTOPRAZOLE SODIUM 40 MG: 40 TABLET, DELAYED RELEASE ORAL at 06:32

## 2018-01-01 RX ADMIN — SODIUM CHLORIDE 500 ML: 0.9 INJECTION, SOLUTION INTRAVENOUS at 12:59

## 2018-01-01 RX ADMIN — ONDANSETRON 4 MG: 2 INJECTION INTRAMUSCULAR; INTRAVENOUS at 12:03

## 2018-01-01 RX ADMIN — TEMAZEPAM 7.5 MG: 7.5 CAPSULE ORAL at 21:58

## 2018-01-01 RX ADMIN — DOCUSATE SODIUM 100 MG: 100 CAPSULE, LIQUID FILLED ORAL at 17:02

## 2018-01-01 RX ADMIN — CARVEDILOL 3.12 MG: 3.12 TABLET, FILM COATED ORAL at 08:41

## 2018-01-01 RX ADMIN — LEVOTHYROXINE SODIUM 50 MCG: 50 TABLET ORAL at 06:32

## 2018-01-01 RX ADMIN — DOCUSATE SODIUM 100 MG: 100 CAPSULE, LIQUID FILLED ORAL at 09:59

## 2018-01-01 RX ADMIN — PANTOPRAZOLE SODIUM 40 MG: 40 TABLET, DELAYED RELEASE ORAL at 06:03

## 2018-01-01 RX ADMIN — METOCLOPRAMIDE 5 MG: 5 INJECTION, SOLUTION INTRAMUSCULAR; INTRAVENOUS at 13:10

## 2018-01-01 RX ADMIN — PANTOPRAZOLE SODIUM 40 MG: 40 TABLET, DELAYED RELEASE ORAL at 15:52

## 2018-01-01 RX ADMIN — ASPIRIN 81 MG: 81 TABLET, COATED ORAL at 09:10

## 2018-01-01 RX ADMIN — ONDANSETRON 4 MG: 2 INJECTION INTRAMUSCULAR; INTRAVENOUS at 11:44

## 2018-01-01 RX ADMIN — PANTOPRAZOLE SODIUM 40 MG: 40 TABLET, DELAYED RELEASE ORAL at 17:14

## 2018-01-01 RX ADMIN — MELATONIN TAB 3 MG 6 MG: 3 TAB at 21:45

## 2018-01-01 RX ADMIN — ONDANSETRON 4 MG: 2 INJECTION INTRAMUSCULAR; INTRAVENOUS at 22:26

## 2018-01-01 RX ADMIN — LEVOTHYROXINE SODIUM 50 MCG: 50 TABLET ORAL at 06:24

## 2018-01-01 RX ADMIN — HEPARIN SODIUM 5000 UNITS: 5000 INJECTION, SOLUTION INTRAVENOUS; SUBCUTANEOUS at 21:58

## 2018-01-01 RX ADMIN — DOBUTAMINE IN DEXTROSE 2.5 MCG/KG/MIN: 200 INJECTION, SOLUTION INTRAVENOUS at 06:09

## 2018-01-01 NOTE — PLAN OF CARE
CARDIOVASCULAR - ADULT     Maintains optimal cardiac output and hemodynamic stability Progressing     Absence of cardiac dysrhythmias or at baseline rhythm Progressing        DISCHARGE PLANNING     Discharge to home or other facility with appropriate resources Progressing        GASTROINTESTINAL - ADULT     Minimal or absence of nausea and/or vomiting Progressing     Maintains adequate nutritional intake Progressing        GENITOURINARY - ADULT     Maintains or returns to baseline urinary function Progressing     Absence of urinary retention Progressing        Knowledge Deficit     Patient/family/caregiver demonstrates understanding of disease process, treatment plan, medications, and discharge instructions Progressing        METABOLIC, FLUID AND ELECTROLYTES - ADULT     Electrolytes maintained within normal limits Progressing     Fluid balance maintained Progressing        MUSCULOSKELETAL - ADULT     Maintain or return mobility to safest level of function Progressing        Potential for Falls     Patient will remain free of falls Progressing        Prexisting or High Potential for Compromised Skin Integrity     Skin integrity is maintained or improved Progressing        SKIN/TISSUE INTEGRITY - ADULT     Skin integrity remains intact Progressing

## 2018-01-01 NOTE — PROGRESS NOTES
NEPHROLOGY PROGRESS NOTE   Canda Dakins 80 y o  male MRN: [id number]  Unit/Bed#: [place] 2 -01 Encounter: [id number]      ASSESSMENT and PLAN:  1  Acute kidney injury:  Felt to be multifactorial possibly from cardiomyopathy +/- uric acid nephropathy  He did not have a renal biopsy as this likely would not    -UA:  Trace protein, UPC ratio: 0 12g   -urine eos 0%   -   -due to azotemia and oliguria he was started on dialysis on 12/29   -he then had 3 treatments in a row with his last treatment yesterday   -still making very minimal urine output with only 200 cc yesterday (confirmed with nurse that this is accurate)   -follow strict urine output   -next treatment scheduled for Wednesday    -increase lasix to 80 IV bid (with hold parameters)   -monitor for signs of recovery    -case management consulted yesterday for outpatient HD   2  CKD stage 4:  Baseline creatinine mid to high 2s  Last 2 9 on 11/1   -follows with Dr Swapna Cloud   3   Gout:  Uric acid was elevated at 13 but currently asymptomatic  4  Mineral bone disease of CKD: phos trending down and improved to 4 4 yesterday     -not on binders   -PTH elevated at 453 5 and will start hectorol 2mg q HD   5  Anemia:  Iron sat 11% and ferritin 116   -will start venofer 100mg x 10 doses with HD   6  Access:  Temporary HD catheter placed 12/28 and if remains on HD would need conversion to permacath this week   7  Combined systolic and diastolic CHF:  Increase lasix to 80 IV bid to see if this helps but currently just having volume removal with HD         SUBJECTIVE:  Patient is feeling weak and tired today  Denies any chest pain or shortness of breath  He still is making very minimal urine output      OBJECTIVE:  Current Weight: Weight - Scale: 67 kg (147 lb 11 3 oz)  Vitals:    01/01/18 0748   BP: 110/56   Pulse: 65   Resp: 19   Temp: (!) 97 1 °F (36 2 °C)   SpO2: 93%       Intake/Output Summary (Last 24 hours) at 01/01/18 0828  Last data filed at 01/01/18 0748   Gross per 24 hour   Intake              540 ml   Output             2700 ml   Net            -2160 ml     General:  No acute distress  Skin:  No rash  Eyes:  Sclerae anicteric  ENT:  Moist mucous membranes  Neck:  Supple, symmetric  Chest:  Bilateral crackles   CVS:  Regular rate and rhythm  Abdomen:  Soft, nontender, nondistended  Extremities:  No edema   Neuro:  Awake and alert  Psych:  Appropriate affect    Medications:  Scheduled Meds:    aspirin 81 mg Oral Daily   carvedilol 3 125 mg Oral BID With Meals   furosemide 60 mg Intravenous BID (diuretic)   heparin (porcine) 5,000 Units Subcutaneous Q8H [facility]   levothyroxine 50 mcg Oral Early Morning   pantoprazole 40 mg Oral BID AC       PRN Meds:   acetaminophen    acetaminophen    guaiFENesin    metoclopramide    ondansetron    ondansetron    Laboratory Results:  Lab Results   Component Value Date    WBC 8 46 12/30/2017    HGB 10 1 (L) 12/30/2017    HCT 31 7 (L) 12/30/2017    MCV 96 12/30/2017     (L) 12/30/2017     Lab Results   Component Value Date    GLUCOSE 101 01/01/2018    CALCIUM 8 0 (L) 01/01/2018     01/01/2018    K 4 5 01/01/2018    CO2 25 01/01/2018    CL 98 (L) 01/01/2018    BUN 52 (H) 01/01/2018    CREATININE 4 52 (H) 01/01/2018     Lab Results   Component Value Date    CALCIUM 8 0 (L) 01/01/2018    PHOS 4 4 (H) 01/01/2018

## 2018-01-01 NOTE — PROGRESS NOTES
[facility] Progress Note  Patient: Shona Wright 80 y o  male   MRN: [id number]  PCP: Neda Patino, DO  Unit/Bed#: [place] 2 -01 Encounter: [id number]  Date Of Visit: 01/01/18      Assessment/plan  Principal Problem: 1  Acute kidney injury on chronic kidney disease 4 secondary to ATN from cardiomyopathy with possible chronic colchicine toxicity  He likely now has ESRD  He did not have a kidney biopsy as this would not   He will need HD chair outpt  Continue on iv lasix    Active Problems: 1  Severe aortic stenosis-continue carvedilol  Not on Ace or Arb due to acute kidney injury    2  Combined systolic and diastolic heart failure- continue IV lasix  3  Hypertension-carvedilol restarted at lower dose 3 125 mg b i d  pts sbp seems to be stable with on episode of hypotension  Will need to monitor and make adjustments as tolerated  4 Hypothyroidism-levothyroxine daily     5  Hyperkalemia-resolved via HD     6  Asthenia due to disease-await physical therapy eval    dispo- family at bedside and updated     Subjective:   Pt seen and examined  Pt has a poor appetite today  He states he feels nauseated  No vomiting  No diarrhea  No abd pain  No f/c no cp no sob    Objective:     Vitals: Blood pressure 110/56, pulse 65, temperature (!) 97 1 °F (36 2 °C), temperature source Tympanic, resp  rate 19, height 5' 5" (1 651 m), weight 67 kg (147 lb 11 3 oz), SpO2 93 %  ,Body mass index is 24 58 kg/m²  Lab, Imaging and other studies:    Results from last 7 days  Lab Units 12/30/17  0833  12/26/17  1457   WBC Thousand/uL 8 46  < > 8 36   HEMOGLOBIN g/dL 10 1*  < > 12 0   HEMATOCRIT % 31 7*  < > 37 5   PLATELETS Thousands/uL 137*  < > 199   INR   --   --  1 12   < > = values in this interval not displayed      Results from last 7 days  Lab Units 01/01/18  0452  12/29/17  1615   SODIUM mmol/L 137  < >  --    POTASSIUM mmol/L 4 5  < >  --    CHLORIDE mmol/L 98*  < >  --    CO2 mmol/L 25  < >  --    BUN mg/dL 52*  < >  --    CREATININE mg/dL 4 52*  < >  --    CALCIUM mg/dL 8 0*  < >  --    TOTAL PROTEIN g/dL  --   --  6 8   BILIRUBIN TOTAL mg/dL  --   --  0 82   ALK PHOS U/L  --   --  112   ALT U/L  --   --  352*   AST U/L  --   --  180*   GLUCOSE RANDOM mg/dL 101  < >  --    < > = values in this interval not displayed  Results from last 7 days  Lab Units 12/26/17  1457   CK TOTAL U/L 103     No results found for: Lawernce Going      Xr Chest Pa & Lateral    Result Date: 1/1/2018  Narrative: CHEST INDICATION:  Shortness of breath  Possible pulmonary edema COMPARISON:  December 27, 2017 VIEWS:  Frontal and lateral projections IMAGES:  3 FINDINGS:     The heart mediastinum remain stable in their appearance  Dual-lead pacing device unchanged in position  There is been interval placement of a right IJ line tip terminating at the cavoatrial junction  There are stable small bilateral pleural effusions  No evidence of pneumothorax  Within the mid lung zone on the left is a region of focal opacity which was visualized on study of October 10, 2016 and may represent residual scarring   Within the left apex there is persistent minimal opacity which may be postoperative in nature  Impression: No evidence of pneumothorax status post line placement  Stable small pleural effusions or pleural thickening Workstation performed: [id number]     Xr Chest Pa & Lateral    Result Date: 12/28/2017  Narrative: CHEST INDICATION: Cough  COMPARISON: 11/2/2016  VIEWS:  Frontal and lateral projections; 2 images FINDINGS: Left chest wall pacemaker is present  Stable cardiomegaly is noted  Congestive failure and small bilateral pleural effusions noted  Osseous structures are age appropriate  Impression: Congestive failure and small bilateral pleural effusions   Workstation performed: [id number]     Ir Temp Hd Cath    Result Date: 12/28/2017  Narrative: Temporary dialysis catheter insertion Clinical History: Renal failure  Fluoroscopy time: 0 4 minutes  Images: 2 Procedure: After explaining the risks and benefits of the procedure to the patient, informed consent was obtained  All elements of maximal sterile barrier technique were followed (cap, mask, sterile gown, sterile gloves, large sterile sheet, hand hygiene, and 2% chlorhexidine for cutaneous antisepsis)  The patient's right internal jugular vein was evaluated as a potential access site  The vein is patent, compressible, and free of thrombus  Under ultrasound guidance, a 19-gauge needle was used to aspirate the internal jugular vein through which a multipurpose 035 J-wire was advanced centrally under fluoroscopic guidance  A static ultrasound image was recorded  After dilating the tract, a 14 Persian 15 cm temporary dialysis catheter was inserted with its tip at the RA/SVC junction under fluoroscopic guidance  The catheter was secured in place with sutures and flushed per protocol  The patient tolerated the procedure well and left the department in stable condition  Impression: Impression: Successful temporary dialysis catheter insertion  Workstation performed: [id number]       Scheduled Meds:   aspirin 81 mg Oral Daily   carvedilol 3 125 mg Oral BID With Meals   furosemide 80 mg Intravenous BID (diuretic)   heparin (porcine) 5,000 Units Subcutaneous Q8H [address]   levothyroxine 50 mcg Oral Early Morning   pantoprazole 40 mg Oral BID AC     Continuous Infusions:    PRN Meds:   acetaminophen    acetaminophen    doxercalciferol    guaiFENesin    iron sucrose    metoclopramide    ondansetron    ondansetron      Physical exam:  Physical Exam  General appearance: alert, appears stated age and cooperative  Head: Normocephalic, without obvious abnormality, atraumatic  Eyes: conjunctivae/corneas clear  PERRL, EOM's intact  Fundi benign    Neck: no adenopathy, no carotid bruit, no JVD, supple, symmetrical, trachea midline and thyroid not enlarged, symmetric, no tenderness/mass/nodules  Lungs: slight crackles at bases bilateral  Heart: regular rate and rhythm, S1, S2 normal, no murmur, click, rub or gallop  Abdomen: soft, non-tender; bowel sounds normal; no masses,  no organomegaly  Extremities: extremities normal, atraumatic, no cyanosis or edema  Pulses: 2+ and symmetric  Skin: Skin color, texture, turgor normal  No rashes or lesions  Neurologic:awake alert oriented x3      VTE Pharmacologic Prophylaxis: Heparin  VTE Mechanical Prophylaxis: sequential compression device    Counseling / Coordination of Care  Total floor / unit time spent today 20 minutes   Current Length of Stay: 6 day(s)    Current Patient Status: Inpatient       Code Status: Level 1 - Full Code

## 2018-01-02 NOTE — PROGRESS NOTES
NEPHROLOGY PROGRESS NOTE   Jorge Simpson 80 y o  male MRN: [id number]  Unit/Bed#: [facility] 2 -01 Encounter: [id number]      ASSESSMENT & PLAN:  1  Acute kidney injury on top of CKD stage 4 baseline creatinine in the high 2s, follows with Dr Mary Rivera as an outpatient  Now dialysis dependent started on dialysis on December 29, patient remains oligo anuric, noted creatinine increased from 4 5-6 1 today showing no evidence of renal recovery, will proceed with hemodialysis today and then continue with that TTS schedule, given his age and previous advanced CKD, he likely progressed to end-stage  Will consult IR for a permanent dialysis catheter placement   have to arrange for outpatient hemodialysis  2   Combined systolic and diastolic congestive heart failure, currently on Lasix b i d , continue with volume removal during dialysis  3   Anemia chronic kidney disease as well as iron deficiency, continue with Venofer on dialysis  4   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  5   Access had a temporary dialysis catheter placed, will consult IR for a permanent dialysis catheter  SUBJECTIVE:  Feeling weak, denies any shortness of breath or chest pain, denies any other complaints  For dialysis this afternoon      OBJECTIVE:  Current Weight: Weight - Scale: 67 4 kg (148 lb 9 4 oz)  Vitals:    01/02/18 0736   BP: 115/56   Pulse: 58   Resp: 18   Temp: (!) 96 8 °F (36 °C)   SpO2: 96%       Intake/Output Summary (Last 24 hours) at 01/02/18 1026  Last data filed at 01/02/18 7702   Gross per 24 hour   Intake                0 ml   Output              100 ml   Net             -100 ml       General:  In no acute distress, elderly fragile gentleman  Skin:   Warm, pale  Eyes:  No jaundice  ENT:  Mucous membranes moist  Neck:  Supple  Chest:  Clear to auscultation bilaterally   CVS:  Regular rate and rhythm  Abdomen:   Soft, nontender, nondistended, normal bowel sounds  Extremities:  No lower extremity edema   Neuro:  Alert and oriented  Psych:  Appropriate mood      Medications:    Current Facility-Administered Medications:     acetaminophen (TYLENOL) oral suspension 650 mg, 650 mg, Oral, Q6H PRN, Mamie Hernandez PA-C, 650 mg at 01/02/18 0348    acetaminophen (TYLENOL) tablet 650 mg, 650 mg, Oral, Q6H PRN, Mamie Hernandez PA-C, 650 mg at 12/31/17 1454    aspirin (ECOTRIN LOW STRENGTH) EC tablet 81 mg, 81 mg, Oral, Daily, Mamie Hernandez PA-C, 81 mg at 01/02/18 8738    carvedilol (COREG) tablet 3 125 mg, 3 125 mg, Oral, BID With Meals, Elmer Walker MD, 3 125 mg at 01/02/18 9638    docusate sodium (COLACE) capsule 100 mg, 100 mg, Oral, BID, Katie Dubon DO, 100 mg at 01/02/18 1897    doxercalciferol (HECTOROL) injection 2 mcg, 2 mcg, Intravenous, After Dialysis, Jose Carl PA-C    furosemide (LASIX) injection 80 mg, 80 mg, Intravenous, BID (diuretic), Jose Carl PA-C, 80 mg at 01/02/18 4786    guaiFENesin (ROBITUSSIN) oral solution 200 mg, 200 mg, Oral, Q4H PRN, Elmer Walker MD, 200 mg at 12/30/17 1250    heparin (porcine) subcutaneous injection 5,000 Units, 5,000 Units, Subcutaneous, Q8H [address], 5,000 Units at 01/02/18 0603 **AND** Platelet count, , , Once, Mamie Hernandez PA-C    iron sucrose (VENOFER) 100 mg in sodium chloride 0 9 % 100 mL IVPB, 100 mg, Intravenous, After Dialysis, Jose Carl PA-C    levothyroxine tablet 50 mcg, 50 mcg, Oral, Early Morning, Mamie Hernandez PA-C, 50 mcg at 01/02/18 0603    melatonin tablet 6 mg, 6 mg, Oral, HS, Joao Rosario DO, 6 mg at 01/01/18 2145    metoclopramide (REGLAN) injection 5 mg, 5 mg, Intravenous, Q8H PRN, Martine Tilley PA-C, 5 mg at 01/01/18 1635    ondansetron [facility]) injection 4 mg, 4 mg, Intravenous, Q6H PRN, Mamie Hernandez PA-C, 4 mg at 12/28/17 2034    ondansetron (ZOFRAN) injection 4 mg, 4 mg, Intravenous, Q6H PRN, Mamie Hernandez PA-C, 4 mg at 01/01/18 1137    pantoprazole (PROTONIX) EC tablet 40 mg, 40 mg, Oral, BID AC, Karthik Patel PA-C, 40 mg at 01/02/18 0603    polyethylene glycol (MIRALAX) packet 17 g, 17 g, Oral, Daily PRN, Lynn Bile, DO    Invasive Devices:      Lab Results:     Results from last 7 days  Lab Units 01/02/18  0456 01/01/18  0452 12/31/17  0458 12/30/17  0833 12/29/17  1615 12/29/17  0610  12/27/17  0555  12/27/17  0159 12/26/17  1457   WBC Thousand/uL 8 65  --   --  8 46  --  8 77  < >  --   < > 9 55 8 36   HEMOGLOBIN g/dL 10 5*  --   --  10 1*  --  11 5*  < >  --   < > 10 9* 12 0   HEMATOCRIT % 32 9*  --   --  31 7*  --  35 7*  < >  --   < > 34 3* 37 5   PLATELETS Thousands/uL 132*  --   --  137*  --  166  < >  --   < > 172 199   SODIUM mmol/L 134* 137 138 137  --  137  < > 139  --  139 138   POTASSIUM mmol/L 4 6 4 5 4 3 4 4  --  5 2  < > 4 9  --  5 3 5 4*   CHLORIDE mmol/L 96* 98* 99* 101  --  100  < > 102  --  102 101   CO2 mmol/L 25 25 26 24  --  22  < > 24  --  27 28   BUN mg/dL 73* 52* 63* 80*  --  109*  < > 90*  --  89* 84*   CREATININE mg/dL 6 17* 4 52* 4 50* 4 43*  --  5 33*  < > 4 37*  --  4 42* 4 03*   CALCIUM mg/dL 8 3 8 0* 8 2* 8 1*  --  8 5  < > 8 3  --  8 4 9 4   MAGNESIUM mg/dL  --   --   --   --   --   --   --   --   --   --  2 9*   PHOSPHORUS mg/dL  --  4 4*  --  5 0*  --  6 1*  --   --   --   --   --    ALBUMIN g/dL  --   --   --  2 9* 3 2* 3 2*  --  3 0*  --  3 1* 3 6   TOTAL PROTEIN g/dL  --   --   --   --  6 8  --   --  6 5  --  6 6 7 7   BILIRUBIN TOTAL mg/dL  --   --   --   --  0 82  --   --  0 31  --  0 34 0 57   ALK PHOS U/L  --   --   --   --  112  --   --  89  --  91 91   ALT U/L  --   --   --   --  352*  --   --  56  --  56 55   AST U/L  --   --   --   --  180*  --   --  38  --  38 38   GLUCOSE RANDOM mg/dL 153* 101 100 101  --  115  < > 142*  --  152* 145*   < > = values in this interval not displayed  Portions of the record may have been created with voice recognition software   Occasional wrong word or "sound a like" substitutions may have occurred due to the inherent limitations of voice recognition software  Read the chart carefully and recognize, using context, where substitutions have occurred  If you have any questions, please contact the dictating provider

## 2018-01-02 NOTE — PLAN OF CARE
Problem: Potential for Falls  Goal: Patient will remain free of falls  INTERVENTIONS:  - Assess patient frequently for physical needs  -  Identify cognitive and physical deficits and behaviors that affect risk of falls  -  [place] fall precautions as indicated by assessment   - Educate patient/family on patient safety including physical limitations  - Instruct patient to call for assistance with activity based on assessment  - Modify environment to reduce risk of injury  - Consider OT/PT consult to assist with strengthening/mobility   Outcome: Progressing      Problem: DISCHARGE PLANNING  Goal: Discharge to home or other facility with appropriate resources  INTERVENTIONS:  - Identify barriers to discharge w/patient and caregiver  - Arrange for needed discharge resources and transportation as appropriate  - Identify discharge learning needs (meds, wound care, etc )  - Arrange for interpretive services to assist at discharge as needed  - Refer to Case Management Department for coordinating discharge planning if the patient needs post-hospital services based on physician/advanced practitioner order or complex needs related to functional status, cognitive ability, or social support system   Outcome: Progressing      Problem: Knowledge Deficit  Goal: Patient/family/caregiver demonstrates understanding of disease process, treatment plan, medications, and discharge instructions  Complete learning assessment and assess knowledge base    Interventions:  - Provide teaching at level of understanding  - Provide teaching via preferred learning methods   Outcome: Progressing      Problem: CARDIOVASCULAR - ADULT  Goal: Maintains optimal cardiac output and hemodynamic stability  INTERVENTIONS:  - Monitor I/O, vital signs and rhythm  - Monitor for S/S and trends of decreased cardiac output i e  bleeding, hypotension  - Administer and titrate ordered vasoactive medications to optimize hemodynamic stability  - Assess quality of pulses, skin color and temperature  - Assess for signs of decreased coronary artery perfusion - ex   Angina  - Instruct patient to report change in severity of symptoms   Outcome: Progressing    Goal: Absence of cardiac dysrhythmias or at baseline rhythm  INTERVENTIONS:  - Continuous cardiac monitoring, monitor vital signs, obtain 12 lead EKG if indicated  - Administer antiarrhythmic and heart rate control medications as ordered  - Monitor electrolytes and administer replacement therapy as ordered   Outcome: Progressing      Problem: GASTROINTESTINAL - ADULT  Goal: Minimal or absence of nausea and/or vomiting  INTERVENTIONS:  - Administer IV fluids as ordered to ensure adequate hydration  - Maintain NPO status until nausea and vomiting are resolved  - Nasogastric tube as ordered  - Administer ordered antiemetic medications as needed  - Provide nonpharmacologic comfort measures as appropriate  - Advance diet as tolerated, if ordered  - Nutrition services referral to assist patient with adequate nutrition and appropriate food choices   Outcome: Progressing    Goal: Maintains adequate nutritional intake  INTERVENTIONS:  - Monitor percentage of each meal consumed  - Identify factors contributing to decreased intake, treat as appropriate  - Assist with meals as needed  - Monitor I&O, WT and lab values  - Obtain nutrition services referral as needed   Outcome: Progressing      Problem: GENITOURINARY - ADULT  Goal: Maintains or returns to baseline urinary function  INTERVENTIONS:  - Assess urinary function  - Encourage oral fluids to ensure adequate hydration  - Administer IV fluids as ordered to ensure adequate hydration  - Administer ordered medications as needed  - Offer frequent toileting  - Follow urinary retention protocol if ordered   Outcome: Progressing    Goal: Absence of urinary retention  INTERVENTIONS:  - Assess patients ability to void and empty bladder  - Monitor I/O  - Bladder scan as needed  - Discuss with physician/AP medications to alleviate retention as needed  - Discuss catheterization for long term situations as appropriate   Outcome: Progressing      Problem: METABOLIC, FLUID AND ELECTROLYTES - ADULT  Goal: Electrolytes maintained within normal limits  INTERVENTIONS:  - Monitor labs and assess patient for signs and symptoms of electrolyte imbalances  - Administer electrolyte replacement as ordered  - Monitor response to electrolyte replacements, including repeat lab results as appropriate  - Instruct patient on fluid and nutrition as appropriate   Outcome: Progressing    Goal: Fluid balance maintained  INTERVENTIONS:  - Monitor labs and assess for signs and symptoms of volume excess or deficit  - Monitor I/O and WT  - Instruct patient on fluid and nutrition as appropriate   Outcome: Progressing      Problem: SKIN/TISSUE INTEGRITY - ADULT  Goal: Skin integrity remains intact  INTERVENTIONS  - Identify patients at risk for skin breakdown  - Assess and monitor skin integrity  - Assess and monitor nutrition and hydration status  - Monitor labs (i e  albumin)  - Assess for incontinence   - Turn and reposition patient  - Assist with mobility/ambulation  - Relieve pressure over bony prominences  - Avoid friction and shearing  - Provide appropriate hygiene as needed including keeping skin clean and dry  - Evaluate need for skin moisturizer/barrier cream  - Collaborate with interdisciplinary team (i e  Nutrition, Rehabilitation, etc )   - Patient/family teaching   Outcome: Progressing      Problem: MUSCULOSKELETAL - ADULT  Goal: Maintain or return mobility to safest level of function  INTERVENTIONS:  - Assess patient's ability to carry out ADLs; assess patient's baseline for ADL function and identify physical deficits which impact ability to perform ADLs (bathing, care of mouth/teeth, toileting, grooming, dressing, etc )  - Assess/evaluate cause of self-care deficits   - Assess range of motion  - Assess patient's mobility; develop plan if impaired  - Assess patient's need for assistive devices and provide as appropriate  - Encourage maximum independence but intervene and supervise when necessary  - Involve family in performance of ADLs  - Assess for home care needs following discharge   - Request OT consult to assist with ADL evaluation and planning for discharge  - Provide patient education as appropriate   Outcome: Progressing      Problem: Prexisting or High Potential for Compromised Skin Integrity  Goal: Skin integrity is maintained or improved  INTERVENTIONS:  - Identify patients at risk for skin breakdown  - Assess and monitor skin integrity  - Assess and monitor nutrition and hydration status  - Monitor labs (i e  albumin)  - Assess for incontinence   - Turn and reposition patient  - Assist with mobility/ambulation  - Relieve pressure over bony prominences  - Avoid friction and shearing  - Provide appropriate hygiene as needed including keeping skin clean and dry  - Evaluate need for skin moisturizer/barrier cream  - Collaborate with interdisciplinary team (i e  Nutrition, Rehabilitation, etc )   - Patient/family teaching   Outcome: Progressing

## 2018-01-02 NOTE — PROGRESS NOTES
[facility] Progress Note  Patient: Garima Glasgow 80 y o  male   MRN: [id number]  PCP: Rachel Jordan, DO  Unit/Bed#: [facility] 2 -01 Encounter: [id number]  Date Of Visit: 01/02/18      Assessment/plan  Principal Problem: 1  Acute kidney injury on chronic kidney disease 4 secondary to ATN from cardiomyopathy with possible chronic colchicine toxicity  He likely now has ESRD  He did not have a kidney biopsy as this would not   He will need HD chair outpt  Continue on iv lasix per nephrology  Pt for perma cath     Active Problems: 1  Severe aortic stenosis-continue carvedilol  Not on Ace or Arb due to acute kidney injury     2  Combined systolic and diastolic heart failure- continue IV lasix      3  Hypertension-carvedilol restarted at lower dose 3 125 mg b i d  pts sbp seems to be stable with on episode of hypotension  Will need to monitor and make adjustments as tolerated      4  Hypothyroidism-levothyroxine daily     5  Hyperkalemia-resolved via HD     6  Asthenia due to disease-await physical therapy eval     dispo- family at bedside and updated    Subjective:   Pt seen and examined  Pt states he had a little bit of diarrhea this am but it has resolved  No f/c no cp no sob no abd pain    Objective:     Vitals: Blood pressure 115/56, pulse 58, temperature (!) 96 8 °F (36 °C), temperature source Tympanic, resp  rate 18, height 5' 5" (1 651 m), weight 67 4 kg (148 lb 9 4 oz), SpO2 96 %  ,Body mass index is 24 73 kg/m²  Lab, Imaging and other studies:    Results from last 7 days  Lab Units 01/02/18  0456  12/26/17  1457   WBC Thousand/uL 8 65  < > 8 36   HEMOGLOBIN g/dL 10 5*  < > 12 0   HEMATOCRIT % 32 9*  < > 37 5   PLATELETS Thousands/uL 132*  < > 199   INR   --   --  1 12   < > = values in this interval not displayed      Results from last 7 days  Lab Units 01/02/18  0456  12/29/17  1615   SODIUM mmol/L 134*  < >  --    POTASSIUM mmol/L 4 6  < >  --    CHLORIDE mmol/L 96*  < > --    CO2 mmol/L 25  < >  --    BUN mg/dL 73*  < >  --    CREATININE mg/dL 6 17*  < >  --    CALCIUM mg/dL 8 3  < >  --    TOTAL PROTEIN g/dL  --   --  6 8   BILIRUBIN TOTAL mg/dL  --   --  0 82   ALK PHOS U/L  --   --  112   ALT U/L  --   --  352*   AST U/L  --   --  180*   GLUCOSE RANDOM mg/dL 153*  < >  --    < > = values in this interval not displayed  Results from last 7 days  Lab Units 12/26/17  1457   CK TOTAL U/L 103     No results found for: Katey Hernandez, WOUNDCULT, SPUTUMCULTUR      Scheduled Meds:   aspirin 81 mg Oral Daily   carvedilol 3 125 mg Oral BID With Meals   docusate sodium 100 mg Oral BID   furosemide 80 mg Intravenous BID (diuretic)   heparin (porcine) 5,000 Units Subcutaneous Q8H [facility]   levothyroxine 50 mcg Oral Early Morning   melatonin 6 mg Oral HS   pantoprazole 40 mg Oral BID AC     Continuous Infusions:    PRN Meds:   acetaminophen    acetaminophen    doxercalciferol    guaiFENesin    iron sucrose    metoclopramide    ondansetron    ondansetron    polyethylene glycol      Physical exam:  Physical Exam  General appearance: alert, appears stated age and cooperative  Head: Normocephalic, without obvious abnormality, atraumatic  Eyes: conjunctivae/corneas clear  PERRL, EOM's intact  Fundi benign    Neck: no adenopathy, no carotid bruit, no JVD, supple, symmetrical, trachea midline and thyroid not enlarged, symmetric, no tenderness/mass/nodules  Lungs: clear to auscultation bilaterally  Heart: regular rate and rhythm, S1, S2 normal, no murmur, click, rub or gallop  Abdomen: soft, non-tender; bowel sounds normal; no masses,  no organomegaly  Extremities: extremities normal, atraumatic, no cyanosis or edema  Pulses: 2+ and symmetric  Skin: Skin color, texture, turgor normal  No rashes or lesions  Neurologic: Grossly normal      VTE Pharmacologic Prophylaxis: Heparin  VTE Mechanical Prophylaxis: sequential compression device    Counseling / Coordination of Care  Total floor / unit time spent today 20 minutes      Current Length of Stay: 7 day(s)    Current Patient Status: Inpatient       Code Status: Level 1 - Full Code

## 2018-01-02 NOTE — CASE MANAGEMENT
Continued Stay Review    Date:  12/30/2017    Vital Signs:  97 9 - 76 - 18   121/54;   RA sat 99%    Last 24 Hours Medication List:      aspirin 81 mg Oral Daily   furosemide 60 mg Intravenous BID (diuretic)   heparin (porcine) 5,000 Units Subcutaneous Q8H [address]   levothyroxine 50 mcg Oral Early Morning   pantoprazole 40 mg Oral BID AC     Tylenol and Robitussin  X 1    Abnormal Labs/Diagnostic Results: Results from last 7 days  Lab Units 12/30/17  0833   12/26/17  1457   WBC Thousand/uL 8 46  < > 8 36   HEMOGLOBIN g/dL 10 1*  < > 12 0   HEMATOCRIT % 31 7*  < > 37 5   PLATELETS Thousands/uL 137*  < > 199   NEUTROS PCT %  --   --  78*   LYMPHS PCT %  --   --  12*   MONOS PCT %  --   --  9   EOS PCT %  --   --  1   < > = values in this interval not displayed      Results from last 7 days  Lab Units 12/30/17  0833 12/29/17  1615   SODIUM mmol/L 137  --    POTASSIUM mmol/L 4 4  --    CHLORIDE mmol/L 101  --    CO2 mmol/L 24  --    BUN mg/dL 80*  --    CREATININE mg/dL 4 43*  --    CALCIUM mg/dL 8 1*  --    TOTAL PROTEIN g/dL  --  6 8   BILIRUBIN TOTAL mg/dL  --  0 82   ALK PHOS U/L  --  112   ALT U/L  --  352*   AST U/L  --  180*   GLUCOSE RANDOM mg/dL 101  --           Age/Sex: 80 y o  male     Assessment/Plan: Principal Problem:    Acute kidney injury superimposed on chronic kidney disease ([address] )- secondary to cardiomyopathy versus chronic colchicine toxicity  He is currently tolerating hemodialysis  Creatinine is trending down  Plan is for hemodialysis again tomorrow  Monitor labs and electrolytes  Active Problems:    Severe aortic stenosis- restart Coreg at 3 125 mg b i d  Combined systolic and diastolic heart failure (HCC)-improved volume status after HD      Hypertension-restart Coreg with hold parameters    Hypothyroidism-continue levothyroxine daily    Hyperkalemia-resolved    Asthenia due to disease-consult PT OT    Discharge Plan: patient will continue to require additional inpatient hospital stay due to Acute kidney injury     For HD tomorrow

## 2018-01-03 NOTE — PROGRESS NOTES
NEPHROLOGY PROGRESS NOTE   Jamel Mcmahon 80 y o  male MRN: [id number]  Unit/Bed#: [facility] 2 -01 Encounter: [id number]      ASSESSMENT & PLAN:  1  Acute kidney injury on top of CKD stage 4 baseline creatinine in the high 2s, follows with Dr Nathalie Alcala as an outpatient  Now dialysis dependent started on dialysis on December 29, patient remains oligo anuric, noted creatinine increased from 4 5-6 1 yesterday showing no evidence of renal recovery  IR consulted for permanent dialysis catheter placement  Given his age and advanced CKD he likely progressed to end-stage renal disease, will continue with hemodialysis on a TTS schedule,  to arrange for outpatient hemodialysis  Discussed with internal Medicine attending  2   Combined systolic and diastolic congestive heart failure, currently on Lasix b i d , continue with volume removal during dialysis  3   Anemia chronic kidney disease as well as iron deficiency, continue with Venofer on dialysis  4   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  5   Access had a temporary dialysis catheter placed, IR consulted for a permanent dialysis catheter  SUBJECTIVE:  Feeling okay, denies any shortness of breath or chest pain  Wife and son at bedside  Had hemodialysis yesterday uneventfully      OBJECTIVE:  Current Weight: Weight - Scale: 65 2 kg (143 lb 11 8 oz)  Vitals:    01/03/18 0727   BP: 104/50   Pulse: 62   Resp: 18   Temp: (!) 96 9 °F (36 1 °C)   SpO2: 96%       Intake/Output Summary (Last 24 hours) at 01/03/18 1155  Last data filed at 01/03/18 0622   Gross per 24 hour   Intake              680 ml   Output             1835 ml   Net            -1155 ml     General: awake, in NAD  Lungs: clear to auscultation bilateral, no crackles, wheezes or rales  Cardiovascular: regular rate and rhythm, no murmur, gallops or rubs, no edema  Abdomen: soft, non tender, non distended, normal bowel sounds  Skin: no rashes, pale, dry  Neurologic: alert, oriented  Temporary right IJ hemodialysis catheter in place      Medications:    Current Facility-Administered Medications:     acetaminophen (TYLENOL) oral suspension 650 mg, 650 mg, Oral, Q6H PRN, Vermell Dancer, PA-C, 650 mg at 01/03/18 0308    acetaminophen (TYLENOL) tablet 650 mg, 650 mg, Oral, Q6H PRN, Vermell Dancer, PA-C, 650 mg at 12/31/17 1454    aspirin (ECOTRIN LOW STRENGTH) EC tablet 81 mg, 81 mg, Oral, Daily, Vermell Dancer, PA-C, 81 mg at 01/03/18 0910    carvedilol (COREG) tablet 3 125 mg, 3 125 mg, Oral, BID With Meals, Nicolasa Ferrer MD, 3 125 mg at 01/02/18 9222    docusate sodium (COLACE) capsule 100 mg, 100 mg, Oral, BID, Katie Dubon DO, 100 mg at 01/03/18 0910    doxercalciferol (HECTOROL) injection 2 mcg, 2 mcg, Intravenous, After Dialysis, Dre Du PA-C    furosemide (LASIX) injection 80 mg, 80 mg, Intravenous, BID (diuretic), Dre Du PA-C, 80 mg at 01/02/18 6063    guaiFENesin (ROBITUSSIN) oral solution 200 mg, 200 mg, Oral, Q4H PRN, Nicolasa Ferrer MD, 200 mg at 12/30/17 1250    heparin (porcine) subcutaneous injection 5,000 Units, 5,000 Units, Subcutaneous, Q8H [address], 5,000 Units at 01/03/18 0614 **AND** Platelet count, , , Once, Vermell Dancer, PA-C    iron sucrose (VENOFER) 100 mg in sodium chloride 0 9 % 100 mL IVPB, 100 mg, Intravenous, After Dialysis, Dre Du PA-C    levothyroxine tablet 50 mcg, 50 mcg, Oral, Early Morning, Vermell Dancer, PA-C, 50 mcg at 01/03/18 0614    melatonin tablet 6 mg, 6 mg, Oral, HS, Joao Rosario DO, 6 mg at 01/02/18 2125    metoclopramide (REGLAN) injection 5 mg, 5 mg, Intravenous, Q8H PRN, Zeferino Jiang PA-C, 5 mg at 01/03/18 0214    ondansetron [facility]) injection 4 mg, 4 mg, Intravenous, Q6H PRN, Vermell Dancer, PA-C, 4 mg at 12/28/17 2034    ondansetron (ZOFRAN) injection 4 mg, 4 mg, Intravenous, Q6H PRN, Vermell Dancer, PA-C, 4 mg at 01/01/18 1137    pantoprazole (PROTONIX) EC tablet 40 mg, 40 mg, Oral, BID Micah MATTHEWS BETTY Mercer, 40 mg at 01/03/18 0614    polyethylene glycol (MIRALAX) packet 17 g, 17 g, Oral, Daily PRN, Shruti Potts,     Invasive Devices:      Lab Results:     Results from last 7 days  Lab Units 01/02/18  0456 01/01/18  0452 12/31/17  0458 12/30/17  0833 12/29/17  1615 12/29/17  0610   WBC Thousand/uL 8 65  --   --  8 46  --  8 77   HEMOGLOBIN g/dL 10 5*  --   --  10 1*  --  11 5*   HEMATOCRIT % 32 9*  --   --  31 7*  --  35 7*   PLATELETS Thousands/uL 132*  --   --  137*  --  166   SODIUM mmol/L 134* 137 138 137  --  137   POTASSIUM mmol/L 4 6 4 5 4 3 4 4  --  5 2   CHLORIDE mmol/L 96* 98* 99* 101  --  100   CO2 mmol/L 25 25 26 24  --  22   BUN mg/dL 73* 52* 63* 80*  --  109*   CREATININE mg/dL 6 17* 4 52* 4 50* 4 43*  --  5 33*   CALCIUM mg/dL 8 3 8 0* 8 2* 8 1*  --  8 5   PHOSPHORUS mg/dL  --  4 4*  --  5 0*  --  6 1*   ALBUMIN g/dL  --   --   --  2 9* 3 2* 3 2*   TOTAL PROTEIN g/dL  --   --   --   --  6 8  --    BILIRUBIN TOTAL mg/dL  --   --   --   --  0 82  --    ALK PHOS U/L  --   --   --   --  112  --    ALT U/L  --   --   --   --  352*  --    AST U/L  --   --   --   --  180*  --    GLUCOSE RANDOM mg/dL 153* 101 100 101  --  115         Portions of the record may have been created with voice recognition software  Occasional wrong word or "sound a like" substitutions may have occurred due to the inherent limitations of voice recognition software  Read the chart carefully and recognize, using context, where substitutions have occurred  If you have any questions, please contact the dictating provider

## 2018-01-03 NOTE — PLAN OF CARE
CARDIOVASCULAR - ADULT     Absence of cardiac dysrhythmias or at baseline rhythm Completed          CARDIOVASCULAR - ADULT     Maintains optimal cardiac output and hemodynamic stability Progressing        DISCHARGE PLANNING     Discharge to home or other facility with appropriate resources Progressing        GASTROINTESTINAL - ADULT     Minimal or absence of nausea and/or vomiting Progressing     Maintains adequate nutritional intake Progressing        GENITOURINARY - ADULT     Maintains or returns to baseline urinary function Progressing     Absence of urinary retention Progressing        Knowledge Deficit     Patient/family/caregiver demonstrates understanding of disease process, treatment plan, medications, and discharge instructions Progressing        METABOLIC, FLUID AND ELECTROLYTES - ADULT     Electrolytes maintained within normal limits Progressing     Fluid balance maintained Progressing        MUSCULOSKELETAL - ADULT     Maintain or return mobility to safest level of function Progressing        Potential for Falls     Patient will remain free of falls Progressing        Prexisting or High Potential for Compromised Skin Integrity     Skin integrity is maintained or improved Progressing        SKIN/TISSUE INTEGRITY - ADULT     Skin integrity remains intact Progressing

## 2018-01-03 NOTE — PROGRESS NOTES
[facility] Progress Note  Patient: Jaclyn Torres 80 y o  male   MRN: [id number]  PCP: Emily Mejias, DO  Unit/Bed#: [facility] 2 -01 Encounter: [id number]  Date Of Visit: 01/03/18      Assessment/plan  Principal Problem:  1  ESRD secondary to ATN from cardiomyopathy with possible chronic colchicine toxicity  He did not have a kidney biopsy as this would not   He will need HD chair outpt  Continue on iv lasix per nephrology  Pt for perma cath tomorrow     Active Problems: 1  Severe aortic stenosis-continue carvedilol   Not on Ace or Arb due to acute kidney injury     2  Combined systolic and diastolic heart failure- continue IV lasix      3  Hypertension-carvedilol restarted at lower dose 3 125 mg b i  d  Pt is having hypotension and this medication has been held  Will d/c for now       4  Hypothyroidism-levothyroxine daily     5  Hyperkalemia-resolved via HD     6  Asthenia due to disease-await physical therapy eval and occupational therapy eval    7  insomnia d/c melatonin  Start restoril       dispo- family at bedside and updated    Subjective:   Pt seen and examined  Pt has not slept well for the last 3 days  He states melatonin is not working  He also states he has a poor appetite  No other problems  No f/c no cp no sob no n/v/d no abd pain    Objective:     Vitals: Blood pressure 104/50, pulse 62, temperature (!) 96 9 °F (36 1 °C), temperature source Tympanic, resp  rate 18, height 5' 5" (1 651 m), weight 65 2 kg (143 lb 11 8 oz), SpO2 96 %  ,Body mass index is 23 92 kg/m²      Lab, Imaging and other studies:    Results from last 7 days  Lab Units 01/02/18  0456   WBC Thousand/uL 8 65   HEMOGLOBIN g/dL 10 5*   HEMATOCRIT % 32 9*   PLATELETS Thousands/uL 132*       Results from last 7 days  Lab Units 01/02/18  0456  12/29/17  1615   SODIUM mmol/L 134*  < >  --    POTASSIUM mmol/L 4 6  < >  --    CHLORIDE mmol/L 96*  < >  --    CO2 mmol/L 25  < >  --    BUN mg/dL 73*  < >  -- CREATININE mg/dL 6 17*  < >  --    CALCIUM mg/dL 8 3  < >  --    TOTAL PROTEIN g/dL  --   --  6 8   BILIRUBIN TOTAL mg/dL  --   --  0 82   ALK PHOS U/L  --   --  112   ALT U/L  --   --  352*   AST U/L  --   --  180*   GLUCOSE RANDOM mg/dL 153*  < >  --    < > = values in this interval not displayed  No results found for: Haze Rower, SPUTUMCULTUR      Scheduled Meds:   aspirin 81 mg Oral Daily   carvedilol 3 125 mg Oral BID With Meals   docusate sodium 100 mg Oral BID   furosemide 80 mg Intravenous BID (diuretic)   heparin (porcine) 5,000 Units Subcutaneous Q8H [address]   levothyroxine 50 mcg Oral Early Morning   melatonin 6 mg Oral HS   pantoprazole 40 mg Oral BID AC     Continuous Infusions:    PRN Meds:   acetaminophen    acetaminophen    doxercalciferol    guaiFENesin    iron sucrose    metoclopramide    ondansetron    ondansetron    polyethylene glycol    temazepam      Physical exam:  Physical Exam  General appearance: alert, appears stated age and cooperative  Head: Normocephalic, without obvious abnormality, atraumatic  Eyes: conjunctivae/corneas clear  PERRL, EOM's intact  Fundi benign    Neck: no adenopathy, no carotid bruit, no JVD, supple, symmetrical, trachea midline and thyroid not enlarged, symmetric, no tenderness/mass/nodules  Lungs: clear to auscultation bilaterally  Heart: regular rate and rhythm, S1, S2 normal, no murmur, click, rub or gallop  Abdomen: soft, non-tender; bowel sounds normal; no masses,  no organomegaly  Extremities: extremities normal, atraumatic, no cyanosis or edema  Pulses: 2+ and symmetric  Skin: Skin color, texture, turgor normal  No rashes or lesions  Neurologic: Grossly normal      VTE Pharmacologic Prophylaxis: Heparin  VTE Mechanical Prophylaxis: sequential compression device    Counseling / Coordination of Care  Total floor / unit time spent today 20 minutes   Current Length of Stay: 8 day(s)    Current Patient Status: Inpatient       Code Status: Level 1 - Full Code

## 2018-01-03 NOTE — CASE MANAGEMENT
Continued Stay Review    Date: 1/3/18    Vital Signs: /50   Pulse 62   Temp (!) 96 9 °F (36 1 °C) (Tympanic)   Resp 18   Ht 5' 5" (1 651 m)   Wt 65 2 kg (143 lb 11 8 oz)   SpO2 96%   BMI 23 92 kg/m²     Medications:   Scheduled Meds:   aspirin 81 mg Oral Daily   docusate sodium 100 mg Oral BID   furosemide 80 mg Intravenous BID (diuretic)   heparin (porcine) 5,000 Units Subcutaneous Q8H [address]   levothyroxine 50 mcg Oral Early Morning   pantoprazole 40 mg Oral BID AC     Continuous Infusions:    PRN Meds:   acetaminophen    acetaminophen    doxercalciferol    guaiFENesin    iron sucrose    metoclopramide    ondansetron    ondansetron    polyethylene glycol    temazepam    Abnormal Labs/Diagnostic Results:   Results from last 7 days  Lab Units 01/02/18  0456   WBC Thousand/uL 8 65   HEMOGLOBIN g/dL 10 5*   HEMATOCRIT % 32 9*   PLATELETS Thousands/uL 132*         Results from last 7 days  Lab Units 01/02/18  0456   12/29/17  1615   SODIUM mmol/L 134*  < >  --    POTASSIUM mmol/L 4 6  < >  --    CHLORIDE mmol/L 96*  < >  --    CO2 mmol/L 25  < >  --    BUN mg/dL 73*  < >  --    CREATININE mg/dL 6 17*  < >  --    CALCIUM mg/dL 8 3  < >  --    TOTAL PROTEIN g/dL  --   --  6 8   BILIRUBIN TOTAL mg/dL  --   --  0 82   ALK PHOS U/L  --   --  112   ALT U/L  --   --  352*   AST U/L  --   --  180*   GLUCOSE RANDOM mg/dL 153*  < >  --    < > = values in this interval not displayed  No results found for: Rosan Orts, SPUTUMCULTUR       Age/Sex: 80 y o  male     Assessment/Plan: Principal Problem:  1  ESRD secondary to ATN from cardiomyopathy with possible chronic colchicine toxicity  He did not have a kidney biopsy as this would not   He will need HD chair outpt  Continue on iv lasix per nephrology  Pt for perma cath tomorrow     Active Problems: 1  Severe aortic stenosis-continue carvedilol   Not on Ace or Arb due to acute kidney injury     2  Combined systolic and diastolic heart failure- continue IV lasix      3  Hypertension-carvedilol restarted at lower dose 3 125 mg b i  d  Pt is having hypotension and this medication has been held  Will d/c for now       4  Hypothyroidism-levothyroxine daily     5  Hyperkalemia-resolved via HD     6  Asthenia due to disease-await physical therapy eval and occupational therapy eval     7  insomnia d/c melatonin   Start restoril       dispo- family at bedside and updated       Discharge Plan:       Scheduled Meds:  aspirin 81 mg Oral Daily   docusate sodium 100 mg Oral BID   furosemide 80 mg Intravenous BID (diuretic)   heparin (porcine) 5,000 Units Subcutaneous Q8H [address]   levothyroxine 50 mcg Oral Early Morning   pantoprazole 40 mg Oral BID AC     Continuous Infusions:   PRN Meds:   Acetaminophen X1    acetaminophen    doxercalciferol    guaiFENesin    iron sucrose    Metoclopramide X1    ondansetron    ondansetron    polyethylene glycol    temazepam

## 2018-01-04 NOTE — SOCIAL WORK
CM spoke with patient's son, Gonzalo Alonzo regarding STR and dialysis  Gonzalo Alonzo would like his father to go to [facility] or [facility] for Dinesh Schwab  Referrals sent  List of facility choices left in room for Gonzalo Alonzo in the event that more facility choices are needed  Gonzalo Alonzo prefers the noon chair time for dialysis  New start dialysis info sent to [facility]  CM to follow up  CM met with patient, youngest son and wife to discuss dc plan as well  They agree with son Kvng's plan

## 2018-01-04 NOTE — PLAN OF CARE
Problem: Potential for Falls  Goal: Patient will remain free of falls  INTERVENTIONS:  - Assess patient frequently for physical needs  -  Identify cognitive and physical deficits and behaviors that affect risk of falls  -  [place] fall precautions as indicated by assessment   - Educate patient/family on patient safety including physical limitations  - Instruct patient to call for assistance with activity based on assessment  - Modify environment to reduce risk of injury  - Consider OT/PT consult to assist with strengthening/mobility   Outcome: Progressing      Problem: DISCHARGE PLANNING  Goal: Discharge to home or other facility with appropriate resources  INTERVENTIONS:  - Identify barriers to discharge w/patient and caregiver  - Arrange for needed discharge resources and transportation as appropriate  - Identify discharge learning needs (meds, wound care, etc )  - Arrange for interpretive services to assist at discharge as needed  - Refer to Case Management Department for coordinating discharge planning if the patient needs post-hospital services based on physician/advanced practitioner order or complex needs related to functional status, cognitive ability, or social support system   Outcome: Progressing      Problem: Knowledge Deficit  Goal: Patient/family/caregiver demonstrates understanding of disease process, treatment plan, medications, and discharge instructions  Complete learning assessment and assess knowledge base    Interventions:  - Provide teaching at level of understanding  - Provide teaching via preferred learning methods   Outcome: Progressing      Problem: CARDIOVASCULAR - ADULT  Goal: Maintains optimal cardiac output and hemodynamic stability  INTERVENTIONS:  - Monitor I/O, vital signs and rhythm  - Monitor for S/S and trends of decreased cardiac output i e  bleeding, hypotension  - Administer and titrate ordered vasoactive medications to optimize hemodynamic stability  - Assess quality of pulses, skin color and temperature  - Assess for signs of decreased coronary artery perfusion - ex   Angina  - Instruct patient to report change in severity of symptoms   Outcome: Progressing      Problem: GASTROINTESTINAL - ADULT  Goal: Minimal or absence of nausea and/or vomiting  INTERVENTIONS:  - Administer IV fluids as ordered to ensure adequate hydration  - Maintain NPO status until nausea and vomiting are resolved  - Nasogastric tube as ordered  - Administer ordered antiemetic medications as needed  - Provide nonpharmacologic comfort measures as appropriate  - Advance diet as tolerated, if ordered  - Nutrition services referral to assist patient with adequate nutrition and appropriate food choices   Outcome: Progressing    Goal: Maintains adequate nutritional intake  INTERVENTIONS:  - Monitor percentage of each meal consumed  - Identify factors contributing to decreased intake, treat as appropriate  - Assist with meals as needed  - Monitor I&O, WT and lab values  - Obtain nutrition services referral as needed   Outcome: Progressing      Problem: GENITOURINARY - ADULT  Goal: Maintains or returns to baseline urinary function  INTERVENTIONS:  - Assess urinary function  - Encourage oral fluids to ensure adequate hydration  - Administer IV fluids as ordered to ensure adequate hydration  - Administer ordered medications as needed  - Offer frequent toileting  - Follow urinary retention protocol if ordered   Outcome: Progressing    Goal: Absence of urinary retention  INTERVENTIONS:  - Assess patients ability to void and empty bladder  - Monitor I/O  - Bladder scan as needed  - Discuss with physician/AP medications to alleviate retention as needed  - Discuss catheterization for long term situations as appropriate   Outcome: Progressing      Problem: METABOLIC, FLUID AND ELECTROLYTES - ADULT  Goal: Electrolytes maintained within normal limits  INTERVENTIONS:  - Monitor labs and assess patient for signs and symptoms of electrolyte imbalances  - Administer electrolyte replacement as ordered  - Monitor response to electrolyte replacements, including repeat lab results as appropriate  - Instruct patient on fluid and nutrition as appropriate   Outcome: Progressing    Goal: Fluid balance maintained  INTERVENTIONS:  - Monitor labs and assess for signs and symptoms of volume excess or deficit  - Monitor I/O and WT  - Instruct patient on fluid and nutrition as appropriate   Outcome: Progressing      Problem: SKIN/TISSUE INTEGRITY - ADULT  Goal: Skin integrity remains intact  INTERVENTIONS  - Identify patients at risk for skin breakdown  - Assess and monitor skin integrity  - Assess and monitor nutrition and hydration status  - Monitor labs (i e  albumin)  - Assess for incontinence   - Turn and reposition patient  - Assist with mobility/ambulation  - Relieve pressure over bony prominences  - Avoid friction and shearing  - Provide appropriate hygiene as needed including keeping skin clean and dry  - Evaluate need for skin moisturizer/barrier cream  - Collaborate with interdisciplinary team (i e  Nutrition, Rehabilitation, etc )   - Patient/family teaching   Outcome: Progressing      Problem: MUSCULOSKELETAL - ADULT  Goal: Maintain or return mobility to safest level of function  INTERVENTIONS:  - Assess patient's ability to carry out ADLs; assess patient's baseline for ADL function and identify physical deficits which impact ability to perform ADLs (bathing, care of mouth/teeth, toileting, grooming, dressing, etc )  - Assess/evaluate cause of self-care deficits   - Assess range of motion  - Assess patient's mobility; develop plan if impaired  - Assess patient's need for assistive devices and provide as appropriate  - Encourage maximum independence but intervene and supervise when necessary  - Involve family in performance of ADLs  - Assess for home care needs following discharge   - Request OT consult to assist with ADL evaluation and planning for discharge  - Provide patient education as appropriate   Outcome: Progressing      Problem: Prexisting or High Potential for Compromised Skin Integrity  Goal: Skin integrity is maintained or improved  INTERVENTIONS:  - Identify patients at risk for skin breakdown  - Assess and monitor skin integrity  - Assess and monitor nutrition and hydration status  - Monitor labs (i e  albumin)  - Assess for incontinence   - Turn and reposition patient  - Assist with mobility/ambulation  - Relieve pressure over bony prominences  - Avoid friction and shearing  - Provide appropriate hygiene as needed including keeping skin clean and dry  - Evaluate need for skin moisturizer/barrier cream  - Collaborate with interdisciplinary team (i e  Nutrition, Rehabilitation, etc )   - Patient/family teaching   Outcome: Progressing

## 2018-01-04 NOTE — PROGRESS NOTES
[facility] Progress Note  Patient: Jamel Mcmahon 80 y o  male   MRN: [id number]  PCP: Kiera Davila DO  Unit/Bed#: [facility] -01 Encounter: [id number]  Date Of Visit: 01/04/18      Assessment/plan  Principal Problem:  1  ESRD secondary to ATN from cardiomyopathy with possible chronic colchicine toxicity  He did not have a kidney biopsy as this would not   He will need HD chair outpt  Lasix changed per nephrology  Pt is s/p permacath     Active Problems: 1  Severe aortic stenosis-continue carvedilol   Not on Ace or Arb due to acute kidney injury     2  Combined systolic and diastolic heart failure- lasix d/yarelis  Continue hd     3  Hypertension-bp stable off of medications  Pt was having hypotension on carvedilol       4  Hypothyroidism-levothyroxine daily     5  Hyperkalemia-resolved via HD     6  Asthenia due to disease-await physical therapy eval and occupational therapy eval     7  Insomnia- continue restoril       dispo- family at bedside and updated    Subjective:   Pt seen and examined  Pt reports that his nausea has resolved  However he does not like his diet, so his appetite has increased but he can't find things to eat  No f/c no cp no sob no v/d no abd pain    Objective:     Vitals: Blood pressure 119/51, pulse 70, temperature (!) 96 6 °F (35 9 °C), temperature source Tympanic, resp  rate 16, height 5' 5" (1 651 m), weight 66 2 kg (145 lb 15 1 oz), SpO2 96 %  ,Body mass index is 24 29 kg/m²      Lab, Imaging and other studies:    Results from last 7 days  Lab Units 01/04/18  0438   WBC Thousand/uL 10 13   HEMOGLOBIN g/dL 10 9*   HEMATOCRIT % 34 2*   PLATELETS Thousands/uL 148*       Results from last 7 days  Lab Units 01/04/18  0439  12/29/17  1615   SODIUM mmol/L 133*  < >  --    POTASSIUM mmol/L 4 9  < >  --    CHLORIDE mmol/L 93*  < >  --    CO2 mmol/L 24  < >  --    BUN mg/dL 62*  < >  --    CREATININE mg/dL 5 99*  < >  --    CALCIUM mg/dL 8 3  < >  --    TOTAL PROTEIN g/dL  --   --  6 8   BILIRUBIN TOTAL mg/dL  --   --  0 82   ALK PHOS U/L  --   --  112   ALT U/L  --   --  352*   AST U/L  --   --  180*   GLUCOSE RANDOM mg/dL 101  < >  --    < > = values in this interval not displayed  No results found for: Channing Goltz, SPUTUMCUMADAI    Scheduled Meds:   aspirin 81 mg Oral Daily   docusate sodium 100 mg Oral BID   furosemide 40 mg Oral BID (diuretic)   heparin (porcine) 5,000 Units Subcutaneous Q8H [facility] & long-term   levothyroxine 50 mcg Oral Early Morning   pantoprazole 40 mg Oral BID AC     Continuous Infusions:    PRN Meds:   acetaminophen    acetaminophen    doxercalciferol    guaiFENesin    iron sucrose    metoclopramide    ondansetron    ondansetron    polyethylene glycol    temazepam      Physical exam:  Physical Exam  General appearance: alert and oriented, in no acute distress  Head: Normocephalic, without obvious abnormality, atraumatic  Eyes: conjunctivae/corneas clear  PERRL, EOM's intact  Fundi benign    Neck: no adenopathy, no carotid bruit, no JVD, supple, symmetrical, trachea midline and thyroid not enlarged, symmetric, no tenderness/mass/nodules  Lungs: clear to auscultation bilaterally  Heart: regular rate and rhythm, S1, S2 normal, no murmur, click, rub or gallop  Abdomen: soft, non-tender; bowel sounds normal; no masses,  no organomegaly  Extremities: extremities normal, warm and well-perfused; no cyanosis, clubbing, or edema  Pulses: 2+ and symmetric  Skin: Skin color, texture, turgor normal  No rashes or lesions  Neurologic: Grossly normal      VTE Pharmacologic Prophylaxis: Heparin  VTE Mechanical Prophylaxis: sequential compression device    Counseling / Coordination of Care  Total floor / unit time spent today 20 minutes     Current Length of Stay: 9 day(s)    Current Patient Status: Inpatient       Code Status: Level 1 - Full Code

## 2018-01-04 NOTE — PLAN OF CARE
Problem: Potential for Falls  Goal: Patient will remain free of falls  INTERVENTIONS:  - Assess patient frequently for physical needs  -  Identify cognitive and physical deficits and behaviors that affect risk of falls  -  [place] fall precautions as indicated by assessment   - Educate patient/family on patient safety including physical limitations  - Instruct patient to call for assistance with activity based on assessment  - Modify environment to reduce risk of injury  - Consider OT/PT consult to assist with strengthening/mobility   Outcome: Progressing      Problem: DISCHARGE PLANNING  Goal: Discharge to home or other facility with appropriate resources  INTERVENTIONS:  - Identify barriers to discharge w/patient and caregiver  - Arrange for needed discharge resources and transportation as appropriate  - Identify discharge learning needs (meds, wound care, etc )  - Arrange for interpretive services to assist at discharge as needed  - Refer to Case Management Department for coordinating discharge planning if the patient needs post-hospital services based on physician/advanced practitioner order or complex needs related to functional status, cognitive ability, or social support system   Outcome: Progressing      Problem: Knowledge Deficit  Goal: Patient/family/caregiver demonstrates understanding of disease process, treatment plan, medications, and discharge instructions  Complete learning assessment and assess knowledge base    Interventions:  - Provide teaching at level of understanding  - Provide teaching via preferred learning methods   Outcome: Progressing      Problem: CARDIOVASCULAR - ADULT  Goal: Maintains optimal cardiac output and hemodynamic stability  INTERVENTIONS:  - Monitor I/O, vital signs and rhythm  - Monitor for S/S and trends of decreased cardiac output i e  bleeding, hypotension  - Administer and titrate ordered vasoactive medications to optimize hemodynamic stability  - Assess quality of pulses, skin color and temperature  - Assess for signs of decreased coronary artery perfusion - ex   Angina  - Instruct patient to report change in severity of symptoms   Outcome: Progressing      Problem: GASTROINTESTINAL - ADULT  Goal: Minimal or absence of nausea and/or vomiting  INTERVENTIONS:  - Administer IV fluids as ordered to ensure adequate hydration  - Maintain NPO status until nausea and vomiting are resolved  - Nasogastric tube as ordered  - Administer ordered antiemetic medications as needed  - Provide nonpharmacologic comfort measures as appropriate  - Advance diet as tolerated, if ordered  - Nutrition services referral to assist patient with adequate nutrition and appropriate food choices   Outcome: Completed Date Met: 01/04/18    Goal: Maintains adequate nutritional intake  INTERVENTIONS:  - Monitor percentage of each meal consumed  - Identify factors contributing to decreased intake, treat as appropriate  - Assist with meals as needed  - Monitor I&O, WT and lab values  - Obtain nutrition services referral as needed   Outcome: Progressing      Problem: GENITOURINARY - ADULT  Goal: Maintains or returns to baseline urinary function  INTERVENTIONS:  - Assess urinary function  - Encourage oral fluids to ensure adequate hydration  - Administer IV fluids as ordered to ensure adequate hydration  - Administer ordered medications as needed  - Offer frequent toileting  - Follow urinary retention protocol if ordered   Outcome: Progressing    Goal: Absence of urinary retention  INTERVENTIONS:  - Assess patients ability to void and empty bladder  - Monitor I/O  - Bladder scan as needed  - Discuss with physician/AP medications to alleviate retention as needed  - Discuss catheterization for long term situations as appropriate   Outcome: Progressing      Problem: METABOLIC, FLUID AND ELECTROLYTES - ADULT  Goal: Electrolytes maintained within normal limits  INTERVENTIONS:  - Monitor labs and assess patient for signs and symptoms of electrolyte imbalances  - Administer electrolyte replacement as ordered  - Monitor response to electrolyte replacements, including repeat lab results as appropriate  - Instruct patient on fluid and nutrition as appropriate   Outcome: Progressing    Goal: Fluid balance maintained  INTERVENTIONS:  - Monitor labs and assess for signs and symptoms of volume excess or deficit  - Monitor I/O and WT  - Instruct patient on fluid and nutrition as appropriate   Outcome: Progressing      Problem: SKIN/TISSUE INTEGRITY - ADULT  Goal: Skin integrity remains intact  INTERVENTIONS  - Identify patients at risk for skin breakdown  - Assess and monitor skin integrity  - Assess and monitor nutrition and hydration status  - Monitor labs (i e  albumin)  - Assess for incontinence   - Turn and reposition patient  - Assist with mobility/ambulation  - Relieve pressure over bony prominences  - Avoid friction and shearing  - Provide appropriate hygiene as needed including keeping skin clean and dry  - Evaluate need for skin moisturizer/barrier cream  - Collaborate with interdisciplinary team (i e  Nutrition, Rehabilitation, etc )   - Patient/family teaching   Outcome: Progressing      Problem: MUSCULOSKELETAL - ADULT  Goal: Maintain or return mobility to safest level of function  INTERVENTIONS:  - Assess patient's ability to carry out ADLs; assess patient's baseline for ADL function and identify physical deficits which impact ability to perform ADLs (bathing, care of mouth/teeth, toileting, grooming, dressing, etc )  - Assess/evaluate cause of self-care deficits   - Assess range of motion  - Assess patient's mobility; develop plan if impaired  - Assess patient's need for assistive devices and provide as appropriate  - Encourage maximum independence but intervene and supervise when necessary  - Involve family in performance of ADLs  - Assess for home care needs following discharge   - Request OT consult to assist with ADL evaluation and planning for discharge  - Provide patient education as appropriate   Outcome: Progressing      Problem: Prexisting or High Potential for Compromised Skin Integrity  Goal: Skin integrity is maintained or improved  INTERVENTIONS:  - Identify patients at risk for skin breakdown  - Assess and monitor skin integrity  - Assess and monitor nutrition and hydration status  - Monitor labs (i e  albumin)  - Assess for incontinence   - Turn and reposition patient  - Assist with mobility/ambulation  - Relieve pressure over bony prominences  - Avoid friction and shearing  - Provide appropriate hygiene as needed including keeping skin clean and dry  - Evaluate need for skin moisturizer/barrier cream  - Collaborate with interdisciplinary team (i e  Nutrition, Rehabilitation, etc )   - Patient/family teaching   Outcome: Progressing

## 2018-01-04 NOTE — PROGRESS NOTES
NEPHROLOGY PROGRESS NOTE   Josue Tracy 80 y o  male MRN: [id number]  Unit/Bed#: [facility] 2 -01 Encounter: [id number]      ASSESSMENT & PLAN:  1  Acute kidney injury on top of CKD stage 4 baseline creatinine in the high 2s, follows with Dr Misha Parkinson as an outpatient  Now dialysis dependent started on dialysis on December 29, patient remains oligo anuric, no evidence of significant renal recovery  Permanent dialysis catheter placed today by IR, help appreciated  Given his age and advanced CKD he likely progressed to end-stage renal disease, for dialysis today and then continue with hemodialysis on a TTS schedule,  to arrange for outpatient hemodialysis  Discussed with internal Medicine attending  2   Combined systolic and diastolic congestive heart failure, continue with UF during dialysis as tolerated  No significant diuresis response to intravenously Lasix, will change to p o  3   Anemia chronic kidney disease as well as iron deficiency, continue with Venofer on dialysis  4   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  5   Access, right IJ permanent dialysis catheter placed by IR today  SUBJECTIVE:  Denies any significant complaints, denies shortness of breath or chest pain  Return from IR after permanent dialysis catheter placement      OBJECTIVE:  Current Weight: Weight - Scale: 66 2 kg (145 lb 15 1 oz)  Vitals:    01/04/18 1039   BP: 99/50   Pulse: 62   Resp: 18   Temp: (!) 96 6 °F (35 9 °C)   SpO2: 96%       Intake/Output Summary (Last 24 hours) at 01/04/18 1217  Last data filed at 01/03/18 2256   Gross per 24 hour   Intake                0 ml   Output               25 ml   Net              -25 ml     General:  In no acute distress  Skin:  Pale, no rash  Eyes:  No jaundice  ENT:  Mucous membranes mildly dry  Neck:  Supple  Chest:  Slightly decreased breath sounds at bases   CVS:  Regular rate and rhythm  Abdomen:  Soft, nontender, nondistended, normal bowel sounds  Extremities:  Minimal lower extremity edema   Neuro:  Alert and interactive, hard of hearing  Psych:  Appropriate mood      Medications:    Current Facility-Administered Medications:     acetaminophen (TYLENOL) oral suspension 650 mg, 650 mg, Oral, Q6H PRN, Keke Orta PA-C, 650 mg at 01/03/18 0308    acetaminophen (TYLENOL) tablet 650 mg, 650 mg, Oral, Q6H PRN, Keke Orta PA-C, 650 mg at 12/31/17 1454    aspirin (ECOTRIN LOW STRENGTH) EC tablet 81 mg, 81 mg, Oral, Daily, Keke Orta PA-C, 81 mg at 01/04/18 1042    docusate sodium (COLACE) capsule 100 mg, 100 mg, Oral, BID, Katie Dubon DO, 100 mg at 01/04/18 1042    doxercalciferol (HECTOROL) injection 2 mcg, 2 mcg, Intravenous, After Dialysis, Soha Enrique PA-C    furosemide (LASIX) injection 80 mg, 80 mg, Intravenous, BID (diuretic), Soha Enrique PA-C, 80 mg at 01/02/18 9021    guaiFENesin (ROBITUSSIN) oral solution 200 mg, 200 mg, Oral, Q4H PRN, Cas Cleaning MD, 200 mg at 12/30/17 1250    heparin (porcine) subcutaneous injection 5,000 Units, 5,000 Units, Subcutaneous, Q8H [facility] & longterm, 5,000 Units at 01/04/18 0507 **AND** Platelet count, , , Once, Keke Orta PA-C    iron sucrose (VENOFER) 100 mg in sodium chloride 0 9 % 100 mL IVPB, 100 mg, Intravenous, After Dialysis, Soha Enrique PA-C    levothyroxine tablet 50 mcg, 50 mcg, Oral, Early Morning, Keke Orta PA-C, 50 mcg at 01/03/18 3777    metoclopramide (REGLAN) injection 5 mg, 5 mg, Intravenous, Q8H PRN, Narciso Cruz PA-C, 5 mg at 01/03/18 0214    ondansetron [facility]) injection 4 mg, 4 mg, Intravenous, Q6H PRN, Keke Orta PA-C, 4 mg at 12/28/17 2034    ondansetron (ZOFRAN) injection 4 mg, 4 mg, Intravenous, Q6H PRN, Keke Orta PA-C, 4 mg at 01/01/18 1137    pantoprazole (PROTONIX) EC tablet 40 mg, 40 mg, Oral, BID AC, Keke Orta PA-C, 40 mg at 01/03/18 1510    polyethylene glycol (MIRALAX) packet 17 g, 17 g, Oral, Daily PRN,  Jeevan, DO   temazepam (RESTORIL) capsule 7 5 mg, 7 5 mg, Oral, HS PRN, Katie Dbuon, DO, 7 5 mg at 01/03/18 2131    Invasive Devices:      Lab Results:     Results from last 7 days  Lab Units 01/04/18  0439 01/04/18  0438 01/02/18  0456 01/01/18  0452  12/30/17  0833 12/29/17  1615 12/29/17  0610   WBC Thousand/uL  --  10 13 8 65  --   --  8 46  --  8 77   HEMOGLOBIN g/dL  --  10 9* 10 5*  --   --  10 1*  --  11 5*   HEMATOCRIT %  --  34 2* 32 9*  --   --  31 7*  --  35 7*   PLATELETS Thousands/uL  --  148* 132*  --   --  137*  --  166   SODIUM mmol/L 133*  --  134* 137  < > 137  --  137   POTASSIUM mmol/L 4 9  --  4 6 4 5  < > 4 4  --  5 2   CHLORIDE mmol/L 93*  --  96* 98*  < > 101  --  100   CO2 mmol/L 24  --  25 25  < > 24  --  22   BUN mg/dL 62*  --  73* 52*  < > 80*  --  109*   CREATININE mg/dL 5 99*  --  6 17* 4 52*  < > 4 43*  --  5 33*   CALCIUM mg/dL 8 3  --  8 3 8 0*  < > 8 1*  --  8 5   PHOSPHORUS mg/dL  --   --   --  4 4*  --  5 0*  --  6 1*   ALBUMIN g/dL  --   --   --   --   --  2 9* 3 2* 3 2*   TOTAL PROTEIN g/dL  --   --   --   --   --   --  6 8  --    BILIRUBIN TOTAL mg/dL  --   --   --   --   --   --  0 82  --    ALK PHOS U/L  --   --   --   --   --   --  112  --    ALT U/L  --   --   --   --   --   --  352*  --    AST U/L  --   --   --   --   --   --  180*  --    GLUCOSE RANDOM mg/dL 101  --  153* 101  < > 101  --  115   < > = values in this interval not displayed  Portions of the record may have been created with voice recognition software  Occasional wrong word or "sound a like" substitutions may have occurred due to the inherent limitations of voice recognition software  Read the chart carefully and recognize, using context, where substitutions have occurred  If you have any questions, please contact the dictating provider

## 2018-01-04 NOTE — DISCHARGE INSTRUCTIONS
Perma-cath Placement   WHAT YOU NEED TO KNOW:   A perma-cath is a catheter placed through a vein into or near your right atrium  Your right atrium is the right upper chamber of your heart  A perma-cath is used for dialysis in an emergency or until a long-term device is ready to use  After your procedure, you will have some pain and swelling on your chest and neck  You may have some bruises on your chest and neck  You may also have 2 dressings, one on your chest and one on your neck  DISCHARGE INSTRUCTIONS:   Call 911 for any of the following:   · You feel lightheaded, short of breath, and have chest pain  · Your catheter comes out   Contact Interventional Radiology at [phone] Jody PATIENTS: Contact Interventional Radiology at [phone]) Moni Jim PATIENTS: Contact Interventional Radiology at [phone]) if:  · Blood soaks through your bandage  · You have new swelling in your arm, neck, face, or chest on your right side  · Your catheter gets wet  · Your bruises or pain get worse  · You have a fever or chills  · Persistent nausea or vomiting  · Your incision is red, swollen, or draining pus  · You have questions or concerns about your condition or care  Self-care:       · Resume your normal diet  · Keep your dressings dry  Do not take a shower or swim  You may take a tub bath, but do not get your dressings wet  Water in your wound can cause bacteria to grow and cause an infection  If your dressing gets wet, dry it off and cover it with dry sterile gauze  Call your healthcare provider  Do not use soaps or ointments  · Do not change your dressings  Your healthcare provider or dialysis nurse will change your dressings  Your dressings should stay in place until your healthcare provider removes them  The dressing on your chest will stay as long as you have the catheter in place  The dressing prevents infection  · Do not remove the red and blue caps from the end of your catheter   The caps prevent air from getting into your catheter    Follow up with your healthcare provider as directed: Write down your questions so you remember to ask them during your visits

## 2018-01-05 NOTE — PROGRESS NOTES
[facility] Progress Note  Patient: Perry Hubbard 80 y o  male   MRN: [id number]  PCP: Jhony Galo, DO  Unit/Bed#: [place] [facility] 2 -01 Encounter: [id number]  Date Of Visit: 01/05/18      Assessment/plan  Principal Problem:  1  ESRD secondary to ATN from cardiomyopathy with possible chronic colchicine toxicity  He did not have a kidney biopsy as this would not   He will need HD chair outpt  Pt is s/p permacath     Active Problems: 1  Severe aortic stenosis-continue carvedilol   Not on Ace or Arb due to acute kidney injury     2  Acute systolic and diastolic heart failure- pt was treated with lasix but not improving  It has since been changed to po  Would consider d/cing as pt is not making much urine and due to his hypotension  Continue hd       3  Hypertension-carvedilol held due to this  bp decreased as lasix po was started  Would consider d/cing or decreasing dose       4  Hypothyroidism-levothyroxine daily     5  Hyperkalemia-resolved via HD     6  Asthenia due to disease-await physical therapy eval and occupational therapy eval     7  Insomnia- continue restoril  8  Poor po intake- dietary educated wife about diet  Wife will bring in food from home as pt prefers to eat that then hospital food       dispo- family at bedside and updated    Subjective:   Pt seen and examined  Pt states his appetite has improved but he still doesn't like hospital food  He prefers his wifes cooking  No f/c no cp no sob no n/v/d no abd pain    Objective:     Vitals: Blood pressure 109/54, pulse 61, temperature (!) 97 2 °F (36 2 °C), temperature source Tympanic, resp  rate 17, height 5' 5" (1 651 m), weight 65 7 kg (144 lb 13 5 oz), SpO2 98 %  ,Body mass index is 24 1 kg/m²      Lab, Imaging and other studies:    Results from last 7 days  Lab Units 01/04/18  0438   WBC Thousand/uL 10 13   HEMOGLOBIN g/dL 10 9*   HEMATOCRIT % 34 2*   PLATELETS Thousands/uL 148*       Results from last 7 days  Lab Units 01/05/18  0450  12/29/17  1615   SODIUM mmol/L 135*  < >  --    POTASSIUM mmol/L 4 1  < >  --    CHLORIDE mmol/L 96*  < >  --    CO2 mmol/L 27  < >  --    BUN mg/dL 40*  < >  --    CREATININE mg/dL 4 31*  < >  --    CALCIUM mg/dL 8 1*  < >  --    TOTAL PROTEIN g/dL  --   --  6 8   BILIRUBIN TOTAL mg/dL  --   --  0 82   ALK PHOS U/L  --   --  112   ALT U/L  --   --  352*   AST U/L  --   --  180*   GLUCOSE RANDOM mg/dL 110  < >  --    < > = values in this interval not displayed  No results found for: Solomon Po, SPUTUMCULTUR      Scheduled Meds:   aspirin 81 mg Oral Daily   docusate sodium 100 mg Oral BID   furosemide 40 mg Oral BID (diuretic)   heparin (porcine) 5,000 Units Subcutaneous Q8H [address]   [START ON 1/6/2018] iron sucrose 100 mg Intravenous Once per day on Tue Thu Sat   levothyroxine 50 mcg Oral Early Morning   pantoprazole 40 mg Oral BID AC   temazepam 7 5 mg Oral HS     Continuous Infusions:    PRN Meds:   acetaminophen    acetaminophen    doxercalciferol    guaiFENesin    metoclopramide    ondansetron    ondansetron    polyethylene glycol      Physical exam:  Physical Exam  General appearance: alert and oriented, in no acute distress  Head: Normocephalic, without obvious abnormality, atraumatic  Eyes: conjunctivae/corneas clear  PERRL, EOM's intact  Fundi benign    Neck: no adenopathy, no carotid bruit, no JVD, supple, symmetrical, trachea midline and thyroid not enlarged, symmetric, no tenderness/mass/nodules  Lungs: clear to auscultation bilaterally  Heart: regular rate and rhythm, S1, S2 normal, no murmur, click, rub or gallop  Abdomen: soft, non-tender; bowel sounds normal; no masses,  no organomegaly  Extremities: extremities normal, warm and well-perfused; no cyanosis, clubbing, or edema  Pulses: 2+ and symmetric  Skin: Skin color, texture, turgor normal  No rashes or lesions  Neurologic: Grossly normal      VTE Pharmacologic Prophylaxis: Heparin  VTE Mechanical Prophylaxis: sequential compression device    Counseling / Coordination of Care  Total floor / unit time spent today 20 minutes    Current Length of Stay: 10 day(s)    Current Patient Status: Inpatient       Code Status: Level 1 - Full Code

## 2018-01-05 NOTE — PHYSICAL THERAPY NOTE
PT Evaluation (21min)  (8:50-9:11)    Past Medical History:   Diagnosis Date    KEO (acute kidney injury) ([facility] [facility] ) 12/27/2017    Aortic stenosis     severe    Cardiomyopathy ([facility] )     CH[facility]congest[facility] failure) ([facility]     CKD (chronic kidney disease) stage 3, GFR 30-59 ml/min     Combined systolic and diastolic heart failure (HCC)     GERD (gastroesophageal reflux disease)     Heart murmur     Hyperkalemia 12/27/2017    Hypertension     Hypothyroidism     hypothyroid    Pulmonary hypertension     Pulmonary hypertension, moderate to severe     Severe aortic stenosis     Sinus pause     Syncope         01/05/18 0911   Note Type   Note type Eval only   Pain Assessment   Pain Assessment No/denies pain   Home Living   Type of [address] One level;Stairs to enter with rails  (2 NAHUN)   [address] Walker;Cane   Prior Function   Level of [place] Independent with ADLs and functional mobility  (ambulates c SPC)   Lives With Son;Spouse  ((-) alone)   ADL Assistance Needs assistance   Falls in the last 6 months 0   Vocational Retired   Restrictions/Precautions   Other Precautions Chair Alarm; Bed Alarm; Fall Risk   General   Additional Pertinent History pt presents to [facility] c nausea, vomiting, + diarrhea  dx: KEO  also new start HD  PT consulted for mobility + d/c planning  activity as tolerated  Family/Caregiver Present No   Cognition   Orientation Level Oriented X4   RUE Assessment   RUE Assessment WFL   LUE Assessment   LUE Assessment WFL   RLE Assessment   RLE Assessment WFL  (4-/5)   LLE Assessment   LLE Assessment WFL  (4-/5)   Coordination   Sensation WFL   Bed Mobility   Supine to Sit 5  Supervision   Additional items HOB elevated; Increased time required   Transfers   Sit to Stand 4  Minimal assistance   Additional items Assist x 1;Verbal cues   Stand to Sit 4  Minimal assistance   Additional items Assist x 1; Armrests; Verbal cues Ambulation/Elevation   Gait pattern Narrow JC; Forward Flexion;Decreased foot clearance   Gait Assistance 4  Minimal assist   Additional items Assist x 1;Verbal cues   Assistive Device Rolling walker   Distance 100'   Balance   Static Sitting Good   Dynamic Sitting Good   Static Standing Fair -   Dynamic Standing Fair -   Ambulatory Fair -   Higher level balance (TUG score: 24sec (high fall risk))   Activity Tolerance   Activity Tolerance Patient limited by fatigue   Nurse Made Aware july   Assessment   Prognosis Good   Problem List Decreased strength; Impaired balance;Decreased endurance;Decreased mobility   Assessment pt is an 87y/o m who presents to [facility] c KEO  new start HD  PMH significant for CKD IV, CHF, syncope, + GERD  at baseline, pt mod (I) c functional mobility c SPC  resides c son + spouse in ranch home c 2 NAHUN c rail  denies h/o falls  currently requires min (A)x1 for OOB mobility tasks 2* deficits in strength, balance, gait quality + activity tolerance noted in PT exam above  pt also of advanced age + at high fall risk evidenced by TUG score of 24sec  Barthel Index 65/100  min verbal cues for safe technique c transfers  ambulated 100' c RW c min verbal cues for posture + to remain safe distance c in JC of RW  able to sit OOB in chair c chair alarm activated  would benefit from skilled PT to maximize functional mobility + return to PLOF  upon d/c, recommend STR  PT eval of high complexity 2* unstable med status c pt requiring ongoing medical management 2* KEO  pt of advanced age c multiple co-morbidities including new start HD  presents c significant decline in mobility from baseline requiring min (A)x1 + RW  resides in ranch home c 2 NAHUN + will need STR upon d/c  pt currently on bed/chair alarm for increased safety  Barriers to Discharge Inaccessible home environment   Goals   Patient Goals "to get better"  STG Expiration Date 01/15/18   Short Term Goal #1 1   increase strength 1/2 grade to improve overall functional mobility, 2  perform bed mobility mod (I) to sit up + eat a meal, 3  safely perform transfers c (S) to perform ADLs, 4  ambulate 300' c (S) + least restrictive AD to safely navigate home environment, 5  negotiate 2 stairs c (S) to safely enter home   Plan   Treatment/Interventions Functional transfer training;LE strengthening/ROM; Elevations; Therapeutic exercise; Endurance training;Patient/family training;Equipment eval/education; Bed mobility;Gait training;Spoke to nursing;OT   PT Frequency 5x/wk   Recommendation   Recommendation Short-term skilled PT   PT - OK to Discharge Yes  (to STR)   Barthel Index   Feeding 10   Bathing 0   Grooming Score 5   Dressing Score 5   Bladder Score 10   Bowels Score 10   Toilet Use Score 5   Transfers (Bed/Chair) Score 10   Mobility (Level Surface) Score 10   Stairs Score 0   Barthel Index Score 65     Wandy Guo, PT

## 2018-01-05 NOTE — PLAN OF CARE
Problem: OCCUPATIONAL THERAPY ADULT  Goal: Performs self-care activities at highest level of function for planned discharge setting  See evaluation for individualized goals  Treatment Interventions: ADL retraining, Functional transfer training, UE strengthening/ROM, Endurance training, Patient/family training, Compensatory technique education, Energy conservation, Activityengagement, Equipment evaluation/education, Cognitive reorientation          See flowsheet documentation for full assessment, interventions and recommendations  Limitation: Decreased ADL status, Decreased UE strength, Decreased endurance, Decreased Safe judgement during ADL, Decreased self-care trans, Decreased high-level ADLs  Prognosis: Good  Assessment: Pt is a 80 y o  male seen for OT evaluation s/p admit to [address] [address] on 12/26/2017 w/ nausea and vomiting and diagnosed w/ Acute kidney injury superimposed on chronic kidney disease ([facility] [address] )  Pt started dialysis this admission  Comorbidities affecting pt's functional performance at time of assessment include: HTN, cardiomyopathy, severe aortic stenosis  Personal factors affecting pt at time of IE include:increased fatigue  Prior to admission, pt was living w/ spouse and son and reports independent w/ ADLs w/ assist for LB ADLs at times from son, independent functional transfers and mobility w/ [facility], wife and son complete IADLs and transport pt to appointments  Upon evaluation: Pt requires supervision bed mobility, MIN assist sit<>Stand w/ VCs for safety, MIN assist functional mobility w/ RW, MIN assist UB ADLS, MOD assist LB ADLS 2* the following deficits impacting occupational performance: decreased strength and endurance, impaired balance, impaired activity tolerance, decreased safety awareness, questionable STM   Pt to benefit from continued skilled OT tx while in the hospital to address deficits as defined above and maximize level of functional independence w ADL's and functional mobility  Occupational Performance areas to address include: grooming, bathing/shower, toilet hygiene, dressing, functional mobility and clothing management  From OT standpoint, recommendation at time of d/c would be short term rehab         OT Discharge Recommendation: Short Term Rehab         Comments: Jf Suarez MS, OTR/L

## 2018-01-05 NOTE — PLAN OF CARE
Problem: PHYSICAL THERAPY ADULT  Goal: Performs mobility at highest level of function for planned discharge setting  See evaluation for individualized goals  Treatment/Interventions: Functional transfer training, LE strengthening/ROM, Elevations, Therapeutic exercise, Endurance training, Patient/family training, Equipment eval/education, Bed mobility, Gait training, Spoke to nursing, OT          See flowsheet documentation for full assessment, interventions and recommendations  Prognosis: Good  Problem List: Decreased strength, Impaired balance, Decreased endurance, Decreased mobility  Assessment: pt is an 89y/o m who presents to [facility] c KEO  new start HD  PMH significant for CKD IV, CHF, syncope, + GERD  at baseline, pt mod (I) c functional mobility c SPC  resides c son + spouse in ranch home c 2 NAHUN c rail  denies h/o falls  currently requires min (A)x1 for OOB mobility tasks 2* deficits in strength, balance, gait quality + activity tolerance noted in PT exam above  pt also of advanced age + at high fall risk evidenced by TUG score of 24sec  Barthel Index 65/100  min verbal cues for safe technique c transfers  ambulated 100' c RW c min verbal cues for posture + to remain safe distance c in JC of RW  able to sit OOB in chair c chair alarm activated  would benefit from skilled PT to maximize functional mobility + return to PLOF  upon d/c, recommend STR  PT eval of high complexity 2* unstable med status c pt requiring ongoing medical management 2* KEO  pt of advanced age c multiple co-morbidities including new start HD  presents c significant decline in mobility from baseline requiring min (A)x1 + RW  resides in ranch home c 2 NAHUN + will need STR upon d/c  pt currently on bed/chair alarm for increased safety  Barriers to Discharge: Inaccessible home environment     Recommendation: Short-term skilled PT     PT - OK to Discharge: Yes (to STR)    See flowsheet documentation for full assessment

## 2018-01-05 NOTE — OCCUPATIONAL THERAPY NOTE
[address] Evaluation     Patient Name: Jacinto Candelaria  NHYOF'B Date: 1/5/2018  Problem List  Patient Active Problem List   Diagnosis    CKD (chronic kidney disease) stage 4, GFR 15-29 ml/min ([facility])    Hypertension    Severe aortic stenosis    [facility]    Hypothyroidism    Pulmonary hypertension, moderate to severe    Syncope and collapse    GERD (gastroesophageal reflux disease)    Acute kidney [facility] superimposed on chronic [facility] disease [facility] )    Hyperkalemia     Past Medical History  Past Medical History:   Diagnosis Date    KEO ([facility] kidney injury) ([facility] [facility] ) 12/27/2017    Aortic stenosis     severe    [facility]omy[facility] ([facility] )     CHF (congestive heart failure) ([facility])     CKD (chronic kidney disease) stage 3, GFR 30-59 ml/min     Combined systolic and diastolic heart failure ([facility])     GERD (gastroesophageal reflux disease)     Heart murmur     Hyperkalemia 12/27/2017    Hypertension     Hypothyroidism     hypothyroid    Pulmonary hypertension     Pulmonary hypertension, moderate to severe     Severe aortic stenosis     Sinus pause     Syncope      Past Surgical History  Past Surgical History:   Procedure Laterality Date    BACK SURGERY      BACK SURGERY      disc surgery    LUNG BIOPSY      pneumothorax           01/05/18 0910   Note Type   Note type Eval/Treat   Restrictions/Precautions   Weight Bearing Precautions Per Order No   Other Precautions Chair Alarm; Fall Risk   Pain Assessment   Pain Assessment No/denies pain   Pain Score No Pain   Home Living   Type of [address] [facility] One level  (2 NAHUN)   Bathroom Shower/Tub Walk-in shower   Bathroom Toilet Standard   Bathroom Equipment Shower chair;Grab bars in shower   [address] Walker   Additional Comments pt son completes driving   Prior Function   Level of [place] Independent with ADLs and functional mobility; Needs assistance with IADLs; Needs assistance with ADLs and functional mobility   Lives With Spouse; Son   Terrence Help From Family   ADL Assistance Independent   IADLs Needs assistance   Falls in the last 6 months 0   Vocational Retired   Comments pt has 24/7 supervision at home   Lifestyle   Autonomy per pt independent w/ ADLS, IADLs, functional transfers and mobility w/ RW   Reciprocal Relationships spouse and son   Service to Others retired [employer]   Intrinsic Gratification fixing antique bicycles   ADL   Where Assessed Chair   Eating Assistance 5  [address] [facility] 5  [address] [facility] 4  [address] 3  Moderate Assistance   [address] 4  [address] 3  Moderate Assistance   [address]  3  Moderate Assistance   Bed Mobility   Supine to Sit 5  Supervision   Additional items Assist x 1; Increased time required;Verbal cues; Bedrails   Transfers   Sit to Stand 4  Minimal assistance   Additional items Assist x 1; Increased time required;Verbal cues   Stand to Sit 4  Minimal assistance   Additional items Assist x 1; Increased time required;Verbal cues;Armrests   Functional Mobility   Functional Mobility 4  Minimal assistance   Additional Comments assist x 1 w/ VCs to stay within RW   Additional items Rolling walker   Balance   Static Sitting Good   Dynamic Sitting Fair +   Static Standing Fair -   Dynamic Standing Fair -   Ambulatory Poor +   Activity Tolerance   Activity Tolerance Patient limited by fatigue   Nurse Made Aware appropriate to see per RNColt   RUHANNAH Assessment   RUE Assessment WFL  (4-/5)   LUE Assessment   LUE Assessment WFL  (4-/5)   Hand Function   Gross Motor Coordination Functional   Fine Motor Coordination Functional   Sensation   Light Touch No apparent deficits   Proprioception   Proprioception No apparent deficits   Vision-Basic Assessment   Current Vision Wears glasses only for reading   Vision - Complex Assessment   Ocular Range of Motion [facility]   Acuity Able to read clock/calendar on wall without difficulty   Perception   Inattention/Neglect Appears intact   Cognition   Overall Cognitive Status [facility]   Arousal/Participation Alert; Cooperative   Attention Within functional limits   Orientation Level Oriented to person;Oriented to place;Oriented to time;Oriented to situation  (not specific date)   Memory Decreased short term memory   Following Commands Follows one step commands without difficulty   Comments pt engages in appropriate conversations, appears w/ STM deficits   Assessment   Limitation Decreased ADL status; Decreased UE strength;Decreased endurance;Decreased Safe judgement during ADL;Decreased self-care trans;Decreased high-level ADLs   Prognosis Good   Assessment Pt is a 80 y o  male seen for OT evaluation s/p admit to [facility] [address] [address]  on 12/26/2017 w/ nausea and vomiting and diagnosed w/ Acute kidney injury superimposed on chronic kidney disease ([facility] [address] )  Pt started dialysis this admission  Comorbidities affecting pt's functional performance at time of assessment include: HTN, cardiomyopathy, severe aortic stenosis  Personal factors affecting pt at time of IE include:increased fatigue  Prior to admission, pt was living w/ spouse and son and reports independent w/ ADLs w/ assist for LB ADLs at times from son, independent functional transfers and mobility w/ [facility], wife and son complete IADLs and transport pt to appointments  Upon evaluation: Pt requires supervision bed mobility, MIN assist sit<>Stand w/ VCs for safety, MIN assist functional mobility w/ RW, MIN assist UB ADLS, MOD assist LB ADLS 2* the following deficits impacting occupational performance: decreased strength and endurance, impaired balance, impaired activity tolerance, decreased safety awareness, questionable STM   Pt to benefit from continued skilled OT tx while in the hospital to address deficits as defined above and maximize level of functional independence w ADL's and functional mobility  Occupational Performance areas to address include: grooming, bathing/shower, toilet hygiene, dressing, functional mobility and clothing management  From OT standpoint, recommendation at time of d/c would be short term rehab  Goals   Patient Goals "to get better"   LTG Time Frame 7-10   Long Term Goal please see below goals   Plan   Treatment Interventions ADL retraining;Functional transfer training;UE strengthening/ROM; Endurance training;Patient/family training; Compensatory technique education; Energy conservation; Activityengagement;Equipment evaluation/education;Cognitive reorientation   Goal Expiration Date 01/15/18   OT Frequency 3-5x/wk   Recommendation   OT Discharge Recommendation Short Term Rehab   Barthel Index   Feeding 10   Bathing 0   Grooming Score 5   Dressing Score 5   Bladder Score 5   Bowels Score 10   Toilet Use Score 5   Transfers (Bed/Chair) Score 10   Mobility (Level Surface) Score 10   Stairs Score 0   Barthel Index Score 60   Modified Shawn Scale   Modified [place] Scale 4     Occupational Therapy Goals to be met in 7-10 days:  1) Pt will improve activity tolerance to G for min 30 min txment sessions  2) Pt will complete ADLs/self care w/ mod I   3) Pt will complete toileting w/ mod I w/ G hygiene/thoroughness using DME PRN  4) Pt will improve functional transfers on/off all surfaces using DME PRN w/ G balance/safety including toileting w/ mod I  5) Pt will improve fx'l mobility during I/ADl/leisure tasks using DME PRN w/ g balance/safety w/ mod I  6) Pt will engage in ongoing cognitive assessment w/ G participation to A w/ safe d/c planning/recommendations  7) Pt will demonstrate G carryover of pt/caregiver education and training as appropriate w/ mod I  w/ G tolerance  8) Pt will engage in depression screen/leisure interest checklist w/ G participation to monitor s/s depression and ID 3 positive coping strategies to A w/ emotional regulation and management  9) Pt will demonstrate 100% carryover of E C  techniques w/ mod I t/o fx'l I/ADL/leisure tasks w/o cues s/p skilled education  10) Pt will demonstrate improved standing tolerance to 3-5 minutes during functional tasks w/ good - dynamic balance to enhance ADL performance  Documentation completed by: Akanksha Angel MS, OTR/L

## 2018-01-05 NOTE — PROGRESS NOTES
NEPHROLOGY PROGRESS NOTE   Ingris Saavedra 80 y o  male MRN: [id number]  Unit/Bed#: [place] 2 -01 Encounter: [id number]      ASSESSMENT & PLAN:  1  Acute kidney injury on top of CKD stage 4 baseline creatinine in the high 2s, follows with Dr Stella Ron as an outpatient  Now dialysis dependent started on dialysis on December 29, patient remains oligo anuric, no evidence of significant renal recovery  Permanent dialysis catheter placed by IR on January 4th  Given his age and advanced CKD he likely progressed to end-stage renal disease, will continue with hemodialysis on a TTS schedule,  to arrange for outpatient hemodialysis  2   Combined systolic and diastolic congestive heart failure, continue with UF during dialysis as tolerated  Continue with p o  diuretics  3   Anemia chronic kidney disease as well as iron deficiency, continue with Venofer on dialysis  4   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  5   Access, right IJ permanent dialysis catheter placed by IR 01/04/2018  SUBJECTIVE:  Patient denies any complaints, denies chest pain or shortness of Breath, have hemodialysis yesterday  Waiting for placement      OBJECTIVE:  Current Weight: Weight - Scale: 65 7 kg (144 lb 13 5 oz)  Vitals:    01/05/18 0731   BP: 109/54   Pulse: 61   Resp: 17   Temp: (!) 97 2 °F (36 2 °C)   SpO2: 98%       Intake/Output Summary (Last 24 hours) at 01/05/18 0803  Last data filed at 01/04/18 1623   Gross per 24 hour   Intake              900 ml   Output             1591 ml   Net             -691 ml     General:  In no acute distress, elderly fragile gentleman  Skin:  Pale, no rash  Eyes:  No jaundice  ENT:  Mucous membranes moist  Neck:  Supple  Chest:  Clear to auscultation anteriorly   CVS:  Regular rate and rhythm  Abdomen:  Soft, nontender, nondistended, normal bowel sounds  Extremities:  No significant lower extremity edema   Neuro:  Alert and interactive  Psych:  Appropriate mood      Medications:    Current Facility-Administered Medications:     acetaminophen (TYLENOL) oral suspension 650 mg, 650 mg, Oral, Q6H PRN, Inés Estrella PA-C, 650 mg at 01/03/18 0308    acetaminophen (TYLENOL) tablet 650 mg, 650 mg, Oral, Q6H PRN, Inés Estrella PA-C, 650 mg at 12/31/17 1454    aspirin (ECOTRIN LOW STRENGTH) EC tablet 81 mg, 81 mg, Oral, Daily, Inés Estrella PA-C, 81 mg at 01/04/18 1042    docusate sodium (COLACE) capsule 100 mg, 100 mg, Oral, BID, Katie Dubon, , 100 mg at 01/04/18 1714    doxercalciferol (HECTOROL) injection 2 mcg, 2 mcg, Intravenous, After Dialysis, Nicole Estrada PA-C, 2 mcg at 01/04/18 1517    furosemide (LASIX) tablet 40 mg, 40 mg, Oral, BID (diuretic), Shantanu Moyer MD    [facility] oral solution 200 mg, 200 mg, Oral, Q4H PRN, Belinda Ravi MD, 200 mg at 12/30/17 1250    heparin (porcine) subcutaneous injection 5,000 Units, 5,000 Units, Subcutaneous, Q8H [address], 5,000 Units at 01/04/18 2228 **AND** Platelet count, , , Once, Inés Estrella PA-C    [START ON 1/6/2018] iron sucrose (VENOFER) 100 mg in sodium chloride 0 9 % 100 mL IVPB, 100 mg, Intravenous, Once per day on Tue Thu Sat, Shantanu Moyer MD    levothyroxine tablet 50 mcg, 50 mcg, Oral, Early Morning, Inés Estrella PA-C, 50 mcg at 01/03/18 7032    metoclopramide (REGLAN) injection 5 mg, 5 mg, Intravenous, Q8H PRN, Narciso Bliss PA-C, 5 mg at 01/03/18 0214    ondansetron [facility]) injection 4 mg, 4 mg, Intravenous, Q6H PRN, Inés Estrella PA-C, 4 mg at 12/28/17 2034    ondansetron [facility]) injection 4 mg, 4 mg, Intravenous, Q6H PRN, Inés Estrella PA-C, 4 mg at 01/04/18 2226    pantoprazole (PROTONIX) EC tablet 40 mg, 40 mg, Oral, BID AC, Inés Estrella PA-C, 40 mg at 01/04/18 1714    polyethylene glycol (MIRALAX) packet 17 g, 17 g, Oral, Daily PRN, Katie Dubon DO    temazepam (RESTORIL) capsule 7 5 mg, 7 5 mg, Oral, HS PRN, Katie Dubon DO, 7 5 mg at 01/03/18 2131    Invasive Devices:      Lab Results:     Results from last 7 days  Lab Units 01/05/18  0450 01/04/18  0439 01/04/18  0438 01/02/18  0456 01/01/18  0452  12/30/17  0833 12/29/17  1615   WBC Thousand/uL  --   --  10 13 8 65  --   --  8 46  --    HEMOGLOBIN g/dL  --   --  10 9* 10 5*  --   --  10 1*  --    HEMATOCRIT %  --   --  34 2* 32 9*  --   --  31 7*  --    PLATELETS Thousands/uL  --   --  148* 132*  --   --  137*  --    SODIUM mmol/L 135* 133*  --  134* 137  < > 137  --    POTASSIUM mmol/L 4 1 4 9  --  4 6 4 5  < > 4 4  --    CHLORIDE mmol/L 96* 93*  --  96* 98*  < > 101  --    CO2 mmol/L 27 24  --  25 25  < > 24  --    BUN mg/dL 40* 62*  --  73* 52*  < > 80*  --    CREATININE mg/dL 4 31* 5 99*  --  6 17* 4 52*  < > 4 43*  --    CALCIUM mg/dL 8 1* 8 3  --  8 3 8 0*  < > 8 1*  --    PHOSPHORUS mg/dL  --   --   --   --  4 4*  --  5 0*  --    ALBUMIN g/dL  --   --   --   --   --   --  2 9* 3 2*   TOTAL PROTEIN g/dL  --   --   --   --   --   --   --  6 8   BILIRUBIN TOTAL mg/dL  --   --   --   --   --   --   --  0 82   ALK PHOS U/L  --   --   --   --   --   --   --  112   ALT U/L  --   --   --   --   --   --   --  352*   AST U/L  --   --   --   --   --   --   --  180*   GLUCOSE RANDOM mg/dL 110 101  --  153* 101  < > 101  --    < > = values in this interval not displayed  Portions of the record may have been created with voice recognition software  Occasional wrong word or "sound a like" substitutions may have occurred due to the inherent limitations of voice recognition software  Read the chart carefully and recognize, using context, where substitutions have occurred  If you have any questions, please contact the dictating provider

## 2018-01-05 NOTE — PLAN OF CARE
CARDIOVASCULAR - ADULT     Maintains optimal cardiac output and hemodynamic stability Progressing        DISCHARGE PLANNING     Discharge to home or other facility with appropriate resources Progressing        GASTROINTESTINAL - ADULT     Maintains adequate nutritional intake Progressing        GENITOURINARY - ADULT     Maintains or returns to baseline urinary function Progressing     Absence of urinary retention Progressing        Knowledge Deficit     Patient/family/caregiver demonstrates understanding of disease process, treatment plan, medications, and discharge instructions Progressing        METABOLIC, FLUID AND ELECTROLYTES - ADULT     Electrolytes maintained within normal limits Progressing     Fluid balance maintained Progressing        MUSCULOSKELETAL - ADULT     Maintain or return mobility to safest level of function Progressing        Potential for Falls     Patient will remain free of falls Progressing        Prexisting or High Potential for Compromised Skin Integrity     Skin integrity is maintained or improved Progressing        SKIN/TISSUE INTEGRITY - ADULT     Skin integrity remains intact Progressing

## 2018-01-05 NOTE — SOCIAL WORK
CM faxed HD orders, flowsheets and EKG to [facility]  CM received call from Omkar at [facility] with reference # [id number] and Steffi Mendes will be  for patient with [facility]  CM called Steffi Mendes multiple times throughout the day and was told by colleagues that she was on another call and left message to call CM back to confirm chair time  CM did not receive call from Steffi Mendes   has bed available for patient on Monday, planning to transfer patient on Monday after receiving confirmation of chair time for dialysis  CM spoke with patient's son, Lisa Lara who will transport patient to dialysis during the time he is at the [facility] and after rehab  Lisa Lara is retired, available and understands he must make patient's appointments  CM spoke with Juvencio Timi about son transporting patient to dialysis  Nurse thinks this is an acceptable and safe plan  CM to call patient's son Nelia Nguyen to inform him of dc plan  CM to set up transport to [facility] for Monday after receiving confirmation from [facility] admissions about chair time

## 2018-01-06 NOTE — PLAN OF CARE
CARDIOVASCULAR - ADULT     Maintains optimal cardiac output and hemodynamic stability Progressing        DISCHARGE PLANNING     Discharge to home or other facility with appropriate resources Progressing        GASTROINTESTINAL - ADULT     Maintains adequate nutritional intake Progressing        GENITOURINARY - ADULT     Maintains or returns to baseline urinary function Progressing     Absence of urinary retention Progressing        Knowledge Deficit     Patient/family/caregiver demonstrates understanding of disease process, treatment plan, medications, and discharge instructions Progressing        METABOLIC, FLUID AND ELECTROLYTES - ADULT     Electrolytes maintained within normal limits Progressing     Fluid balance maintained Progressing        MUSCULOSKELETAL - ADULT     Maintain or return mobility to safest level of function Progressing        Nutrition/Hydration-ADULT     Nutrient/Hydration intake appropriate for improving, restoring or maintaining nutritional needs Progressing        Potential for Falls     Patient will remain free of falls Progressing        Prexisting or High Potential for Compromised Skin Integrity     Skin integrity is maintained or improved Progressing        SKIN/TISSUE INTEGRITY - ADULT     Skin integrity remains intact Progressing

## 2018-01-06 NOTE — PROGRESS NOTES
[facility] Internal Medicine Progress Note  Patient: Ingris Saavedra 80 y o  male   MRN: [id number]  PCP: Sapna Ball, DO  Unit/Bed#: [place] 2 -01 Encounter: [id number]  Date Of Visit: 01/06/18      Assessment/plan  Principal Problem:  1  ESRD secondary to ATN from cardiomyopathy with possible chronic colchicine toxicity  He did not have a kidney biopsy as this would not   He will need HD chair outpt  Pt is s/p permacath     Active Problems: 1  Severe aortic stenosis-continue carvedilol   Not on Ace or Arb due to acute kidney injury     2  Acute systolic and diastolic heart failure- pt was treated with lasix but not improving  It has since been changed to po  Would consider d/cing as pt is not making much urine and due to his hypotension  Continue hd       3  Hypertension-carvedilol held due to this  bp decreased as lasix po was started  Would consider d/cing or decreasing dose       4  Hypothyroidism-levothyroxine daily     5  Hyperkalemia-resolved via HD     6  Asthenia due to disease-await physical therapy eval and occupational therapy eval     7  Insomnia- continue restoril       8  Poor po intake- dietary educated wife about diet  Wife will bring in food from home as pt prefers to eat that then hospital food       dispo- awaiting str and hd chair    Subjective:   Pt seen and examined  Pt used restoril last night and slept well  No f/c no cp no sob no n/v/d no abd pain  He still doesn't like the hospital food  Objective:     Vitals: Blood pressure 109/52, pulse 79, temperature 97 7 °F (36 5 °C), temperature source Tympanic, resp  rate 20, height 5' 5" (1 651 m), weight 66 kg (145 lb 8 1 oz), SpO2 97 %  ,Body mass index is 24 21 kg/m²      Lab, Imaging and other studies:    Results from last 7 days  Lab Units 01/04/18  0438   WBC Thousand/uL 10 13   HEMOGLOBIN g/dL 10 9*   HEMATOCRIT % 34 2*   PLATELETS Thousands/uL 148*       Results from last 7 days  Lab Units 01/05/18  0450   SODIUM mmol/L 135*   POTASSIUM mmol/L 4 1   CHLORIDE mmol/L 96*   CO2 mmol/L 27   BUN mg/dL 40*   CREATININE mg/dL 4 31*   CALCIUM mg/dL 8 1*   GLUCOSE RANDOM mg/dL 110         No results found for: BLOODCX, Florentino Gone, WOUNDCULT, SPUTUMCULTUR    Scheduled Meds:   aspirin 81 mg Oral Daily   docusate sodium 100 mg Oral BID   furosemide 40 mg Oral BID (diuretic)   heparin (porcine) 5,000 Units Subcutaneous Q8H [address]   iron sucrose 100 mg Intravenous Once per day on Tue Thu Sat   levothyroxine 50 mcg Oral Early Morning   pantoprazole 40 mg Oral BID AC   temazepam 7 5 mg Oral HS     Continuous Infusions:    PRN Meds:   acetaminophen    acetaminophen    doxercalciferol    guaiFENesin    metoclopramide    ondansetron    ondansetron    polyethylene glycol      Physical exam:  Physical Exam  General appearance: alert and oriented, in no acute distress  Head: Normocephalic, without obvious abnormality, atraumatic  Eyes: conjunctivae/corneas clear  PERRL, EOM's intact  Fundi benign    Neck: no adenopathy, no carotid bruit, no JVD, supple, symmetrical, trachea midline and thyroid not enlarged, symmetric, no tenderness/mass/nodules  Lungs: clear to auscultation bilaterally  Heart: regular rate and rhythm, S1, S2 normal, no murmur, click, rub or gallop  Abdomen: soft, non-tender; bowel sounds normal; no masses,  no organomegaly  Extremities: extremities normal, warm and well-perfused; no cyanosis, clubbing, or edema  Pulses: 2+ and symmetric  Skin: Skin color, texture, turgor normal  No rashes or lesions  Neurologic: Grossly normal      VTE Pharmacologic Prophylaxis: Heparin  VTE Mechanical Prophylaxis: sequential compression device    Counseling / Coordination of Care  Total floor / unit time spent today 20 minutes     Current Length of Stay: 11 day(s)    Current Patient Status: Inpatient       Code Status: Level 1 - Full Code

## 2018-01-06 NOTE — PROGRESS NOTES
NEPHROLOGY PROGRESS NOTE   Bautista Aly 80 y o  male MRN: [id number]  Unit/Bed#: [place] 2 -01 Encounter: [id number]  Reason for Consult:  Acute kidney injury on severe CKD    ASSESSMENT and PLAN:  1  Acute kidney injury:  Severe underlying chronic disease with baseline creatinine in the high 2s followed by Dr Max Ojeda  Patient has been dialysis dependent since December 29th with poor urine output and no evidence of renal recovery  Given advanced age and underlying severe disease is likely end-stage renal disease  · Patient receiving hemodialysis every Tuesday, Thursday, Saturday  · Treatment today  2  Access:  Right IJ PermCath placed on 01/04/2018  3  Anemia:  Chronic disease, iron deficiency  On Venofer on hemodialysis  4  CKD MBD:  Renal diet    SUMMARY OF RECOMMENDATIONS:  · Outpatient placement:  Awaiting final is a keven Hare  Plan is to transfer patient on Monday to [facility] after outpatient plan established  According to  note  Patient's son is planning to transport [place] to hemodialysis treatment  · Continue current intermittent hemodialysis schedule TTS    SUBJECTIVE / INTERVAL HISTORY:  Sleepy  No complaints      OBJECTIVE:  Current Weight: Weight - Scale: 66 kg (145 lb 8 1 oz)  Vitals:    01/05/18 1527 01/05/18 2314 01/06/18 0600 01/06/18 0710   BP: (!) 106/49 109/52  114/54   Pulse: 60 64  73   Resp: 20 20  20   Temp: (!) 96 8 °F (36 °C) 98 1 °F (36 7 °C)  97 7 °F (36 5 °C)   TempSrc: Tympanic Temporal  Tympanic   SpO2: 98% 97%  97%   Weight:   66 kg (145 lb 8 1 oz)    Height:           Intake/Output Summary (Last 24 hours) at 01/06/18 1300  Last data filed at 01/06/18 1030   Gross per 24 hour   Intake                0 ml   Output               50 ml   Net              -50 ml     General:  No acute distress  Skin:  Warm and dry, slightly pale  Eyes:  Sclera clear  ENT:  Oropharynx moist  Neck:  Supple no JVD  Chest:  Chest clear bilaterally  CVS:  Regular rhythm, S1, S2, no murmur, rub, gallop  Abdomen:  Soft, nondistended, nontender, bowel sounds present  Extremities:  No edema noted, extremities warm, well perfused  Neuro:  No deficits  Psych:  Appropriate    Medications:    Current Facility-Administered Medications:     acetaminophen (TYLENOL) oral suspension 650 mg, 650 mg, Oral, Q6H PRN, ALEX LockhartC, 650 mg at 01/03/18 0308    acetaminophen (TYLENOL) tablet 650 mg, 650 mg, Oral, Q6H PRN, ROBI Lockhart-C, 650 mg at 12/31/17 1454    aspirin (ECOTRIN LOW STRENGTH) EC tablet 81 mg, 81 mg, Oral, Daily, John Downey PA-C, 81 mg at 01/06/18 0901    docusate sodium (COLACE) capsule 100 mg, 100 mg, Oral, BID, Katie Ambron, DO, 100 mg at 01/06/18 0901    doxercalciferol (HECTOROL) injection 2 mcg, 2 mcg, Intravenous, After Dialysis, Darek Cavazos PA-C, 2 mcg at 01/04/18 1517    furosemide (LASIX) tablet 40 mg, 40 mg, Oral, BID (diuretic), Angle Wright MD, 40 mg at 01/06/18 0900    guaiFENesin (ROBITUSSIN) oral solution 200 mg, 200 mg, Oral, Q4H PRN, Irwin Mendez MD, 200 mg at 12/30/17 1250    heparin (porcine) subcutaneous injection 5,000 Units, 5,000 Units, Subcutaneous, Q8H [address], 5,000 Units at 01/06/18 3083 **AND** Platelet count, , , Once, Homero Mercer PA-C    iron sucrose (VENOFER) 100 mg in sodium chloride 0 9 % 100 mL IVPB, 100 mg, Intravenous, Once per day on Tue Thu Sat, Angle Wright MD    levothyroxine tablet 50 mcg, 50 mcg, Oral, Early Morning, John Downey PA-C, 50 mcg at 01/06/18 6957    metoclopramide (REGLAN) injection 5 mg, 5 mg, Intravenous, Q8H PRN, Niecy Remy PA-C, 5 mg at 01/05/18 1605    ondansetron [facility]) injection 4 mg, 4 mg, Intravenous, Q6H PRN, John Downey PA-C, 4 mg at 01/05/18 1203    ondansetron (ZOFRAN) injection 4 mg, 4 mg, Intravenous, Q6H PRN, John Downey PA-C, 4 mg at 01/04/18 2226    pantoprazole (PROTONIX) EC tablet 40 mg, 40 mg, Oral, BID Homero MATTHEWS PA-C, 40 mg at 01/06/18 0624    polyethylene glycol (MIRALAX) packet 17 g, 17 g, Oral, Daily PRN, Katie Dubon DO    temazepam (RESTORIL) capsule 7 5 mg, 7 5 mg, Oral, HS, Katie Dubon DO, 7 5 mg at 01/05/18 4453    Laboratory Results:    Results from last 7 days  Lab Units 01/06/18  0602 01/05/18  0450 01/04/18  0439 01/04/18  0438 01/02/18  0456 01/01/18  0452 12/31/17  0458   WBC Thousand/uL  --   --   --  10 13 8 65  --   --    HEMOGLOBIN g/dL  --   --   --  10 9* 10 5*  --   --    HEMATOCRIT %  --   --   --  34 2* 32 9*  --   --    PLATELETS Thousands/uL  --   --   --  148* 132*  --   --    SODIUM mmol/L  --  135* 133*  --  134* 137 138   POTASSIUM mmol/L  --  4 1 4 9  --  4 6 4 5 4 3   CHLORIDE mmol/L  --  96* 93*  --  96* 98* 99*   CO2 mmol/L  --  27 24  --  25 25 26   BUN mg/dL  --  40* 62*  --  73* 52* 63*   CREATININE mg/dL  --  4 31* 5 99*  --  6 17* 4 52* 4 50*   CALCIUM mg/dL  --  8 1* 8 3  --  8 3 8 0* 8 2*   PHOSPHORUS mg/dL 6 9*  --   --   --   --  4 4*  --    GLUCOSE RANDOM mg/dL  --  110 101  --  153* 101 100     Previous work up:

## 2018-01-07 PROBLEM — E87.5 HYPERKALEMIA: Status: RESOLVED | Noted: 2017-01-01 | Resolved: 2018-01-01

## 2018-01-07 NOTE — PROGRESS NOTES
[facility] Internal Medicine Progress Note  Patient: Bridget Subramanian 80 y o  male   MRN: [id number]  PCP: Darlyn Chawla DO  Unit/Bed#: [place] 2 -01 Encounter: [id number]  Date Of Visit: 01/07/18    Assessment/Plan:     #1 KEO/CKD4- Now dialysis dependent- started on HD 12/29/16- HD TTS- s/p permacath 1/4/18  Needs outpatient HD arranged prior to discharge  Pt did not have a kidney bx during stay  Check am BMP    #2 Severe aortic stenosis- follows outpatient with Dr Leslie Mendez with cardiology  Continue with conservative management and outpatient followup after discharge       #3  Acute systolic and diastolic heart failure- c/w PO lasix 40mg BID  monitor daily weights and I&O      #4 Hypertension-carvedilol on hold d/t hypotension  BP systolic ranging low normal 100's       #5 Hypothyroidism-levothyroxine daily    #6 Asthenia due to disease- PT/OT recommending STR     #7 Insomnia- continue restoril       #8 Poor po intake- dietary educated wife about diet  Wife will bring in food from home as pt prefers to eat that then hospital food  Consult nutrition    #9 hyponatremia- check am bmp    #10 anemia secondary to chronic disease- check am cbc and follow  On venofer with HD  VTE Pharmacologic Prophylaxis:   Pharmacologic: Heparin  Mechanical VTE Prophylaxis in Place: Yes    Patient Centered Rounds: I have performed bedside rounds with nursing staff today  Discussions with Specialists or Other Care Team Provider: d/w RN     Education and Discussions with Family / Patient: d/w patient and wife at bedside     Time Spent for Care: 30 minutes  More than 50% of total time spent on counseling and coordination of care as described above      Current Length of Stay: 12 day(s)    Current Patient Status: Inpatient   Certification Statement: The patient will continue to require additional inpatient hospital stay due to pending STR placement    Discharge Plan / Estimated Discharge Date: possible d c in am if bed available and HD set up     Code Status: Level 1 - Full Code      Subjective:   Pt reports he feels restless, difficulty sleeping at HS  Pt reports he isnt eating much because he doesn't like the food  Denies any other complaints  Objective:     Vitals:   Temp (24hrs), Av 8 °F (36 6 °C), Min:97 6 °F (36 4 °C), Max:97 9 °F (36 6 °C)    HR:  [62-79] 62  Resp:  [18-20] 20  BP: ()/(44-82) 103/55  SpO2:  [98 %-100 %] 98 %  Body mass index is 24 21 kg/m²  Input and Output Summary (last 24 hours): Intake/Output Summary (Last 24 hours) at 18 1554  Last data filed at 18 0909   Gross per 24 hour   Intake              420 ml   Output              503 ml   Net              -83 ml       Physical Exam:     Physical Exam   Constitutional: He is oriented to person, place, and time  No distress  Cardiovascular: Normal rate, regular rhythm, normal heart sounds and intact distal pulses  No murmur heard  Pulmonary/Chest: Effort normal and breath sounds normal  No respiratory distress  He has no wheezes  He has no rales  Abdominal: Soft  Bowel sounds are normal  He exhibits no distension  There is no tenderness  There is no rebound  Musculoskeletal: He exhibits no edema or tenderness  Neurological: He is alert and oriented to person, place, and time  No cranial nerve deficit  Skin: Skin is warm and dry  No rash noted  He is not diaphoretic  No erythema  Psychiatric: He has a normal mood and affect  Additional Data:     Labs:      Results from last 7 days  Lab Units 18  0438   WBC Thousand/uL 10 13   HEMOGLOBIN g/dL 10 9*   HEMATOCRIT % 34 2*   PLATELETS Thousands/uL 148*       Results from last 7 days  Lab Units 18  0450   SODIUM mmol/L 135*   POTASSIUM mmol/L 4 1   CHLORIDE mmol/L 96*   CO2 mmol/L 27   BUN mg/dL 40*   CREATININE mg/dL 4 31*   CALCIUM mg/dL 8 1*   GLUCOSE RANDOM mg/dL 110           * I Have Reviewed All Lab Data Listed Above    * Additional Pertinent Lab Tests Reviewed: All Labs Within Last 24 Hours Reviewed        Recent Cultures (last 7 days):           Last 24 Hours Medication List:     aspirin 81 mg Oral Daily   docusate sodium 100 mg Oral BID   furosemide 40 mg Oral BID (diuretic)   heparin (porcine) 5,000 Units Subcutaneous Q8H [address]   iron sucrose 100 mg Intravenous Once per day on Tue Thu Sat   levothyroxine 50 mcg Oral Early Morning   pantoprazole 40 mg Oral BID AC   temazepam 7 5 mg Oral HS        Today, Patient Was Seen By: Colon Schirmer, CRNP    ** Please Note: This note has been constructed using a voice recognition system   **

## 2018-01-08 PROBLEM — Z95.0 PACEMAKER: Status: ACTIVE | Noted: 2018-01-01

## 2018-01-08 PROBLEM — N18.6 END STAGE RENAL DISEASE (HCC): Status: ACTIVE | Noted: 2018-01-01

## 2018-01-08 PROBLEM — I48.0 PAF (PAROXYSMAL ATRIAL FIBRILLATION) (HCC): Status: ACTIVE | Noted: 2018-01-01

## 2018-01-08 PROBLEM — D64.9 ANEMIA: Status: ACTIVE | Noted: 2018-01-01

## 2018-01-08 PROBLEM — E87.1 HYPONATREMIA: Status: ACTIVE | Noted: 2018-01-01

## 2018-01-08 NOTE — CONSULTS
Consulted for poor po intake  Reviewed ways to increase pro, ketty intake, discussed supplement, pt okay with trying nepro  Also reinforced renal diet, but would consider liberalizing d/t poor po  If P continues to be elevated rec phosplo

## 2018-01-08 NOTE — NURSING NOTE
Report called to [facility]; AVS faxed as requested; IV removed; patient transported out by wheelchair for his son to transport him to receiving facility

## 2018-01-08 NOTE — SOCIAL WORK
CM spoke with [place] from [facility]  Received confirmation that patient has T, Th, Sat chairtime at [facility] D[facility] clinic  CM faxed DCI to [facility] HD clinic  CM spoke with patient, his wife and son, Corinne Dawson regarding HD plan  Corinne Dawson will be transporting patient to dialysis  Corinne Dawson is aware to be at facility for the first appointment by 10am  CM also gave Tanya Rodriguez application, should he need transport to dialysis in the future  Son, Corinne Dawson will transport patient to [facility] today at [address] contacts updated in EPIC  MD/RN/facility/family and patient all aware of dc plan  [facility]

## 2018-01-08 NOTE — PLAN OF CARE
Problem: Potential for Falls  Goal: Patient will remain free of falls  INTERVENTIONS:  - Assess patient frequently for physical needs  -  Identify cognitive and physical deficits and behaviors that affect risk of falls  -  [place] fall precautions as indicated by assessment   - Educate patient/family on patient safety including physical limitations  - Instruct patient to call for assistance with activity based on assessment  - Modify environment to reduce risk of injury  - Consider OT/PT consult to assist with strengthening/mobility   Outcome: Completed Date Met: 01/08/18      Problem: DISCHARGE PLANNING  Goal: Discharge to home or other facility with appropriate resources  INTERVENTIONS:  - Identify barriers to discharge w/patient and caregiver  - Arrange for needed discharge resources and transportation as appropriate  - Identify discharge learning needs (meds, wound care, etc )  - Arrange for interpretive services to assist at discharge as needed  - Refer to Case Management Department for coordinating discharge planning if the patient needs post-hospital services based on physician/advanced practitioner order or complex needs related to functional status, cognitive ability, or social support system   Outcome: Completed Date Met: 01/08/18      Problem: Knowledge Deficit  Goal: Patient/family/caregiver demonstrates understanding of disease process, treatment plan, medications, and discharge instructions  Complete learning assessment and assess knowledge base    Interventions:  - Provide teaching at level of understanding  - Provide teaching via preferred learning methods   Outcome: Completed Date Met: 01/08/18      Problem: CARDIOVASCULAR - ADULT  Goal: Maintains optimal cardiac output and hemodynamic stability  INTERVENTIONS:  - Monitor I/O, vital signs and rhythm  - Monitor for S/S and trends of decreased cardiac output i e  bleeding, hypotension  - Administer and titrate ordered vasoactive medications to optimize hemodynamic stability  - Assess quality of pulses, skin color and temperature  - Assess for signs of decreased coronary artery perfusion - ex   Angina  - Instruct patient to report change in severity of symptoms   Outcome: Completed Date Met: 01/08/18      Problem: GASTROINTESTINAL - ADULT  Goal: Maintains adequate nutritional intake  INTERVENTIONS:  - Monitor percentage of each meal consumed  - Identify factors contributing to decreased intake, treat as appropriate  - Assist with meals as needed  - Monitor I&O, WT and lab values  - Obtain nutrition services referral as needed   Outcome: Adequate for Discharge      Problem: GENITOURINARY - ADULT  Goal: Maintains or returns to baseline urinary function  INTERVENTIONS:  - Assess urinary function  - Encourage oral fluids to ensure adequate hydration  - Administer IV fluids as ordered to ensure adequate hydration  - Administer ordered medications as needed  - Offer frequent toileting  - Follow urinary retention protocol if ordered   Outcome: Adequate for Discharge    Goal: Absence of urinary retention  INTERVENTIONS:  - Assess patients ability to void and empty bladder  - Monitor I/O  - Bladder scan as needed  - Discuss with physician/AP medications to alleviate retention as needed  - Discuss catheterization for long term situations as appropriate   Outcome: Adequate for Discharge      Problem: METABOLIC, FLUID AND ELECTROLYTES - ADULT  Goal: Electrolytes maintained within normal limits  INTERVENTIONS:  - Monitor labs and assess patient for signs and symptoms of electrolyte imbalances  - Administer electrolyte replacement as ordered  - Monitor response to electrolyte replacements, including repeat lab results as appropriate  - Instruct patient on fluid and nutrition as appropriate   Outcome: Completed Date Met: 01/08/18    Goal: Fluid balance maintained  INTERVENTIONS:  - Monitor labs and assess for signs and symptoms of volume excess or deficit  - Monitor I/O and WT  - Instruct patient on fluid and nutrition as appropriate   Outcome: Completed Date Met: 01/08/18      Problem: SKIN/TISSUE INTEGRITY - ADULT  Goal: Skin integrity remains intact  INTERVENTIONS  - Identify patients at risk for skin breakdown  - Assess and monitor skin integrity  - Assess and monitor nutrition and hydration status  - Monitor labs (i e  albumin)  - Assess for incontinence   - Turn and reposition patient  - Assist with mobility/ambulation  - Relieve pressure over bony prominences  - Avoid friction and shearing  - Provide appropriate hygiene as needed including keeping skin clean and dry  - Evaluate need for skin moisturizer/barrier cream  - Collaborate with interdisciplinary team (i e  Nutrition, Rehabilitation, etc )   - Patient/family teaching   Outcome: Completed Date Met: 01/08/18      Problem: MUSCULOSKELETAL - ADULT  Goal: Maintain or return mobility to safest level of function  INTERVENTIONS:  - Assess patient's ability to carry out ADLs; assess patient's baseline for ADL function and identify physical deficits which impact ability to perform ADLs (bathing, care of mouth/teeth, toileting, grooming, dressing, etc )  - Assess/evaluate cause of self-care deficits   - Assess range of motion  - Assess patient's mobility; develop plan if impaired  - Assess patient's need for assistive devices and provide as appropriate  - Encourage maximum independence but intervene and supervise when necessary  - Involve family in performance of ADLs  - Assess for home care needs following discharge   - Request OT consult to assist with ADL evaluation and planning for discharge  - Provide patient education as appropriate   Outcome: Completed Date Met: 01/08/18      Problem: Prexisting or High Potential for Compromised Skin Integrity  Goal: Skin integrity is maintained or improved  INTERVENTIONS:  - Identify patients at risk for skin breakdown  - Assess and monitor skin integrity  - Assess and monitor nutrition and hydration status  - Monitor labs (i e  albumin)  - Assess for incontinence   - Turn and reposition patient  - Assist with mobility/ambulation  - Relieve pressure over bony prominences  - Avoid friction and shearing  - Provide appropriate hygiene as needed including keeping skin clean and dry  - Evaluate need for skin moisturizer/barrier cream  - Collaborate with interdisciplinary team (i e  Nutrition, Rehabilitation, etc )   - Patient/family teaching   Outcome: Completed Date Met: 01/08/18      Problem: Nutrition/Hydration-ADULT  Goal: Nutrient/Hydration intake appropriate for improving, restoring or maintaining nutritional needs  Monitor and assess patient's nutrition/hydration status for malnutrition (ex- brittle hair, bruises, dry skin, pale skin and conjunctiva, muscle wasting, smooth red tongue, and disorientation)  Collaborate with interdisciplinary team and initiate plan and interventions as ordered  Monitor patient's weight and dietary intake as ordered or per policy  Utilize nutrition screening tool and intervene per policy  Determine patient's food preferences and provide high-protein, high-caloric foods as appropriate       INTERVENTIONS:  - Monitor oral intake, urinary output, labs, and treatment plans  - Assess nutrition and hydration status and recommend course of action  - Evaluate amount of meals eaten  - Assist patient with eating if necessary   - Allow adequate time for meals  - Recommend/ encourage appropriate diets, oral nutritional supplements, and vitamin/mineral supplements  - Order, calculate, and assess calorie counts as needed  - Recommend, monitor, and adjust tube feedings and TPN/PPN based on assessed needs  - Assess need for intravenous fluids  - Provide specific nutrition/hydration education as appropriate  - Include patient/family/caregiver in decisions related to nutrition   Outcome: Completed Date Met: 01/08/18

## 2018-01-08 NOTE — DISCHARGE SUMMARY
Discharge Summary - Medical Franco Leiva 80 y o  male MRN: [id number]    [place] [address] MED SURG Room / Bed: [place] [address] /South 2 M* Encounter: [id number]    BRIEF OVERVIEW      Admission Date: 12/26/2017       Discharge Date:  01/08/2018    Primary Diagnoses  Principal Problem:    End stage renal disease ([facility] )  Active Problems:    CKD (chronic kidney disease) stage 4, GFR 15-29 ml/min ([facility])    Hypertension    Severe aortic stenosis    Acute on chronic combined systolic and diastolic CHF (congestive heart failure) ([facility])    Hypothyroidism    GERD (gastroesophageal reflux disease)    Acute kidney injury superimposed on chronic kidney disease ([facility])    Hyponatremia    Anemia    PAF (paroxysmal atrial fibrillation) ([facility] )    Pacemaker  Resolved Problems:    Hyperkalemia      Service:  [facility]  Consulting Providers   Nephrology:  Dr Annette Cabezas Toxicology: Dr Nate Senior    Procedures Performed:  12/28: temporary dialysis catheter placement  1/4: IR permacath placement  Hospital Studies:  1/1 KUB: Nonobstructive bowel gas pattern  1/1 CXR: No evidence of pneumothorax status post line placement    Stable small pleural effusions or pleural thickening  12/28: CXR: Congestive failure and small bilateral pleural effusions      Results from last 7 days  Lab Units 01/08/18  0442 01/04/18  0438 01/02/18  0456   WBC Thousand/uL 10 00 10 13 8 65   HEMOGLOBIN g/dL 11 3* 10 9* 10 5*   HEMATOCRIT % 36 3* 34 2* 32 9*   PLATELETS Thousands/uL 161 148* 132*       Results from last 7 days  Lab Units 01/08/18  0442 01/05/18  0450 01/04/18  0439   SODIUM mmol/L 135* 135* 133*   POTASSIUM mmol/L 4 3 4 1 4 9   CHLORIDE mmol/L 95* 96* 93*   CO2 mmol/L 25 27 24   BUN mg/dL 52* 40* 62*   CREATININE mg/dL 5 70* 4 31* 5 99*   GLUCOSE RANDOM mg/dL 105 110 101   CALCIUM mg/dL 8 7 8 1* 8 3       History and Physical Exam:  Please refer to the Admission H&P note    Hospital Course by Problem  1-end-stage renal disease:  Patient has a history of chronic kidney disease stage 4  He initially presented to the hospital and was diagnosed with acute kidney injury, secondary to ATN from cardiomyopathy  He was also noted to have chronic colchicine toxicity  He was followed by the nephrology service during his hospital stay, he was initiated on dialysis, however has been dialysis dependent since December 29, with poor urine output, and no significant evidence of renal recovery  The nephrology service concluded that given his advanced age, and underlying severe chronic kidney disease, he now likely has end-stage renal disease      -patient had a Perma-Cath placed  He was followed by case management who has established outpatient dialysis chair time  2-severe aortic stenosis:  Patient has a history of severe aortic stenosis  He follows with the cardiologist Dr Yuval Valdez in the office  Patient's volume status will be monitored closely on dialysis  -continue outpatient follow-up    3-acute/chronic systolic and diastolic congestive heart failure exacerbation:  Patient's volume status is currently being controlled by dialysis  He is currently on Lasix 40 mg p o  B i d  -continue to monitor closely  -will decrease this back down to his home dose of Lasix 20 mg p o  b i d   Continue to monitor volume status  4-essential hypertension:  Patient had hypotension  His home carvedilol was held, with improvement in his hypotension  Continue to monitor closely  5-hypothyroidism:  Cont synthroid     6-Asthenia:  due to acute/chronic medical issues  PT/OT recommending STR  Pt will be dc today to STR at [facility]      7- Insomnia- continue restoril       8-poor po appetite: cont to encourage po intake      9-hyponatremia- stable  Cont to follow up as outpt      10- anemia:  Secondary to chronic kidney disease:  Continue venofer as needed   Hb stable at 11     11-history of paroxysmal atrial fibrillation:  Patient is noted to be in normal sinus rhythm  Continue outpatient follow-up with Dr Karina Moore  Beta-blocker currently on hold due to hypotension  12-prior history of pacemaker:  Continue routine pacemaker interrogation, and follow up with Cardiology  Patient does have a previous history of syncope secondary to sinus pauses  13-family:  Updated wife and son at bedside  Case d/w pt's nurse and      VTE Pharmacologic Prophylaxis:   Pharmacologic: Heparin  Mechanical VTE Prophylaxis in Place: Yes      Discharge Condition: Improved  Discharge Disposition:  Inpatient short-term rehab    Discharge Note and Physical Exam:   Patient relates he feels well today  He denies any pain anywhere at all  He denies any headache, chest pain, back pain, abdominal pain  He denies any shortness of breath  He notes he has a scant cough which has improved  He denies any worsening cough or chest congestion or phlegm  He denies any abdominal pain, nausea, vomiting, diarrhea  He notes he still has a poor appetite with poor p o  intake due to the poor appetite  He denies any dizziness or lightheadedness  He denies any numbness or weakness  He notes he feels better than previously  Vitals:    01/08/18 0725   BP: 103/51   Pulse: 69   Resp: 20   Temp: 98 3 °F (36 8 °C)   SpO2: 96%     General:  Pleasant, non-tachypnic, non-dyspnic  Conversant  Sitting up in a chair  Family at the bedside  Heart: Regular rate and rhythm, S1S2 present  No murmur, rub or gallop  Lungs: Clear to auscultation bilaterally, no wheezing, rhonchi, or crackles  Good air movement  No accessory muscle use or respiratory distress  Abdomen: soft, non-tender, non-distended, NABS  No rebound or guarding  No mass or peritoneal signs  Extremities: no clubbing, cyanosis or edema  2+ pedal pulses bilaterally  Neurologic:  Awake, alert, communicative    Moving all 4 extremities symmetrically  Fluent and goal directed speech  Skin: warm and dry  No petechiae, purpura or rash  Discharge Medications   Please see Medical Reconciliation Discharge Form    Discharge Follow Up Appointments:   Ab Kumar DO: 1 week  Dialysis: as arranged as outpt  Dr Robby Gan:  1 week    Discharge  Statement   Total Time Spent today including physical exam, discussion with patient and family, his nurse and , review of his chart, and discharge arrangements/care = 55 minutes      This note has been constructed using a voice recognition system

## 2018-01-09 NOTE — PROGRESS NOTES
Assessment    1  Thyroid disorder (246 9) (E07 9)   2  Congestive heart failure (428 0) (I50 9)   3  Severe anemia (285 9) (D64 9)   4  Severe aortic stenosis (424 1) (I35 0)   5  Combined systolic and diastolic heart failure (844 16) (I50 40)   6  Generalized weakness (780 79) (R53 1)   7  Fatigue (780 79) (R53 83)   8  Melena (578 1) (K92 1)   9  Pleural effusion on left (511 9) (J90)   10  Chronic kidney disease, stage 3 (585 3) (N18 3)    Discussion/Summary    As discussed extensively in the history of present illness, patient had hemoglobin of 12 in December and now has a hemoglobin of 8  He is feeling progressively more weak and tired, he has never had a colonoscopy or an EGD and he has noted melena in his stools  He is being referred back to the [facility] emergency room for transfusion and management of his anemia and determination of the etiology of his anemia  I spent 45 minutes reviewing the hospital records, limited information from [facility] care  I also reviewed the labs that were put in my name but not ordered by me which indicate his severe anemia  He had eye hospital was called and notified of his pending arrival    The treatment plan was reviewed with the patient/guardian  The patient/guardian understands and agrees with the treatment plan   45 minutes was spent counseling  Chief Complaint  Pt is here for a f/u was in rehab for skyler heart failure he was discharged on 01/22/2016  History of Present Illness  Patient presents for followup after hospitalization at [facility] from November 25 to December 11, 2015   He subsequently went to [facility] for rehabilitation his discharge diagnoses from [facility] are acute on chronic combined congestive heart failure, transaminitis, left pleural effusion, moderate to severe pulmonary hypertension, moderate to severe aortic stenosis, left pneumothorax, chronic kidney disease stage III and secondary hyperparathyroidism  While in the hospital he had consults with cardiology, pulmonary, nephrology, CT surgery  He did undergo a thoracentesis  He was admitted with a general decline and shortness of breath  He was noted to be in acute renal failure secondary to cardiorenal syndrome after the thoracentesis cytology was negative for malignancy and was found to be a transudate effusion  He was evaluated by cardiothoracic surgery and pulmonary as well as interventional radiology with regards to pleural effusion, patient had a chest tube was ultimately removed without recurrence  With regards to his severe aortic stenosis and cardiomyopathy he was offered cardiac catheterization, but both the family and cardiology ultimately decided this may be too invasive for him in may not change his overall management  After he was stabilized he was transferred to rehabilitation, palliative/hospice care was mentioned but does not appear that he has this at this time  Admission chest x-ray showed total opacification of the left hemithorax CT of the chest showed large left pleural effusion occupying the entire left hemithorax mild mediastinal shift to the right, small right pleural effusion and mild right basilar compressive atelectasis, he also has moderate atherosclerotic disease of the thoracic aorta  Ultrasound of the kidneys showed bilateral renal cysts and a trabeculated urinary bladder wall  Ultrasound of the abdomen showed moderate gallbladder sludge and mildly thickened gallbladder wall, mild ascites  Post thoracentesis CT scan showed markedly decreased left-sided thoracentesis, however, now showed left pneumothorax  Chest tube was replaced followup chest x-ray showed left-sided hydropneumothorax, left lung remained partially collapsed diffuse consolidation with ultimate slow improvement  He would've   The final chest x-ray showed no significant change in the moderate to large left-sided hydropneumothorax status post removal of left sided pigtail catheter  Consolidation and volume loss of the left lung with mediastinal shift to the left unchanged  I have labs in my presence my name on it as the ordering physician obtained from [facility] care  It shows an elevated creatinine of 1 49 I do not know what his baseline is  He is anemic with a hemoglobin of 8 5  This is a patient who is brand-new to me, he was seen in the office once before by our PA for bilateral cerumen impaction  I am unclear how I am listed as the ordering physician and these labs when this is the first time I have ever seen him  His hemoglobin was 12 8 on December 8th and now is 8 5  He states he feel ok except he feels weak  He contradicts himself when describing events, he is quite confused and does not appear to be appropriate to live at home  He had PT after hospitalization  His wife states she never told anyone I was Don's doctor, she just told him I was her doctor and apparently it was assumed I was Don's doctor but he has not been to see us for anything other than a preop for cataract surgery back sometime in 2011 except for the PA performing the cerumen removal  He has never had a colonoscopy or and EGD ever in his life  He does admit to dark stools for as long as he can remember  Review of Systems    Constitutional: as noted in HPI  Eyes: No complaints of eye pain, no red eyes, no discharge from eyes, no itchy eyes  ENT: no complaints of earache, no hearing loss, no nosebleeds, no nasal discharge, no sore throat, no hoarseness  Cardiovascular: No complaints of slow heart rate, no fast heart rate, no chest pain, no palpitations, no leg claudication, no lower extremity  Respiratory: No complaints of shortness of breath, no wheezing, no cough, no SOB on exertion, no orthopnea or PND  Gastrointestinal: No complaints of abdominal pain, no constipation, no nausea or vomiting, no diarrhea or bloody stools     Genitourinary: No complaints of dysuria, no incontinence, no hesitancy, no nocturia, no genital lesion, no testicular pain  Musculoskeletal: No complaints of arthralgia, no myalgias, no joint swelling or stiffness, no limb pain or swelling  Integumentary: No complaints of skin rash or skin lesions, no itching, no skin wound, no dry skin  Active Problems    1  Impacted cerumen of both ears (380 4) (H61 23)    Social History    · Current Some Day Smoker (305 1)    Current Meds   1  Angelika Advanced Aspirin Reg St 325 MG Oral Tablet; TAKE 1 TABLET DAILY; Therapy: [id number] to Recorded   2  Furosemide 20 MG Oral Tablet; TAKE 1 TABLET TWICE DAILY; Therapy: [id number] to Recorded   3  Levothyroxine Sodium 25 MCG Oral Tablet; Take 1 tablet daily; Therapy: 72CTE9815 to (Evaluate:[id number]) Recorded   4  No Reported Medications Recorded   5  Vitamin D 1000 UNIT Oral Tablet; TAKE 1 TABLET DAILY; Therapy: [id number] to Recorded    Allergies    1  No Known Drug Allergies    Vitals  Vital Signs [Data Includes: Current Encounter]    Recorded: [id number] 01:31PM   Temperature 97 3 F   Heart Rate 73   Systolic 203   Diastolic 62   Height 5 ft 5 5 in   Weight 120 lb    BMI Calculated 19 67   BSA Calculated 1 59   O2 Saturation 97     Physical Exam    Constitutional   General appearance: Abnormal   pallorous  Eyes   Conjunctiva and lids: No swelling, erythema, or discharge  Pupils and irises: Equal, round and reactive to light  Ears, Nose, Mouth, and Throat   External inspection of ears and nose: Normal     Otoscopic examination: Tympanic membrance translucent with normal light reflex  Canals patent without erythema  Oropharynx: Normal with no erythema, edema, exudate or lesions  Pulmonary   Respiratory effort: No increased work of breathing or signs of respiratory distress  Auscultation of lungs: Clear to auscultation, equal breath sounds bilaterally, no wheezes, no rales, no rhonci      Cardiovascular   Palpation of heart: Normal PMI, no thrills  Auscultation of heart: Normal rate and rhythm, normal S1 and S2, without murmurs           Signatures   Electronically signed by : Sagar Eli DO; Feb 2 2016  2:50PM EST                       (Author)

## 2018-01-09 NOTE — CONSULTS
Reason For Visit  You're here for follow-up of chronic kidney disease        History of Present Illness  The patient here for follow-up of chronic kidney disease  Since last seen his been doing well with no hospitalizations or intercurrent illnesses  He's had no real changes in medication although it seems that he had an attack of pseudogout in his foot and was placed on indomethacin at some point  He's now not taking it but says he's taking medication 3 times a week and is unsure when it's called  Review of Systems    Constitutional: no fever, no chills, no anorexia and no fatigue  Integumentary: Nonhealing lesion left cheek  Gastrointestinal: no abdominal pain, no nausea, no diarrhea and no vomiting  Respiratory: no shortness of breath, no cough and not coughing up sputum  Cardiovascular: no orthopnea, no chest pain, no palpitations and no lower extremity edema  Neurological: No complaints of headache, no lightheadedness or dizziness  Genitourinary: no dysuria, no hematuria and no incomplete emptying of bladder  Eyes: No complaints of eyesight problems or dryness of eyes  Current Meds   1  Aspirin 81 MG Oral Tablet Delayed Release; TAKE 1 TABLET DAILY AS DIRECTED; Therapy: 68GMP8419 to Recorded   2  Carvedilol 3 125 MG Oral Tablet; TAKE 1 TABLET TWICE DAILY  Requested for:   79ECX0357; Last Rx:02Mar2017 Ordered   3  Furosemide 20 MG Oral Tablet; TAKE 1 TABLET TWICE DAILY; Therapy: 67Gfd9044 to (Evaluate:02Ium8680)  Requested for: 22IEU0846; Last   Rx:47Zyo4379 Ordered   4  iFerex 150 150 MG Oral Capsule; Take 1 capsule twice daily; Therapy: 09IXS2716 to (Last Rx:06Jan2017)  Requested for: 81PMB2467 Ordered   5  Levothyroxine Sodium 50 MCG Oral Tablet; Take 1 tablet daily; Therapy: 35EFA3383 to [phone])  Requested for: 00MCN7866; Last   Rx:05Jan2017 Ordered   6  Pantoprazole Sodium 40 MG Oral Tablet Delayed Release; take one tablet by mouth   twice daily;    Therapy: "14MEH6831 to (Evaluate:80Ulz8303)  Requested for: 22RIM9503; Last   Rx:03Mar2017 Ordered    Allergies    1  No Known Drug Allergies    Vitals   Recorded: 24SLL5758 11:33AM Recorded: 30ULX6710 10:55AM   Heart Rate 68    Respiration 16    Systolic 158    Diastolic 52    Height  5 ft 8 in   Weight  150 lb    BMI Calculated  22 81   BSA Calculated  1 81     Physical Exam    Constitutional: General appearance: No acute distress, well appearing and well nourished  Eyes: Anicteric sclerae  JVD:  No JVD present  Pulmonary: Respiratory effort: No increased work of breathing or signs of respiratory distress  Auscultation of lungs: Clear to auscultation  Cardiovascular: Auscultation of heart: Abnormal   4/6 systolic murmur  Abdomen: Non-tender, no masses  Extremities: No cyanosis, clubbing or edema  Neurologic: Non Focal      Psychiatric: Orientation to person, place, and time: Normal        Results/Data  Cardiac Device In Clinic 17ZVA6886 07:58PM Elease Neither     Test Name Result Flag Reference   MISCELLANEOUS COMMENT (Report)     DEVICE INTERROGATED IN THE [place] OFFICE  BATTERY VOLTAGE ADEQUATE (9 1 YRS)  AP 91%  <1%  ALL LEAD PARAMETERS WITHIN NORMAL LIMITS  2 AMS EPISODES UP TO 10 SEC = PAT WITH RATES   BPM  NO OTHER SIGNIFICANT HIGH RATE EPISODES  RA CAP CONFIRM PROG "ON, RV AUTO-CAPT PROG "ON", PROGRAMMED TO DEVICE CLINIC DIAGNOSTIC SETTINGS  PACEMAKER FUNCTIONING APPROPRIATELY  CP   Cardiac Electrophysiology Report      [id number]  pdf   DEVICE TYPE Pacemaker       Cardiac Electrophysiology Report [id number] 07:58PM Elease Neither     Test Name Result Flag Reference   Cardiac Electrophysiology Report      [id number]  pdf     (1) BASIC METABOLIC PROFILE 68UVA9908 11:22AM Hoda Larkin" REPORT COMMENT:  FASTING:NO     Test Name Result Flag Reference   GLUCOSE 113 mg/dL H 65-99   Fasting reference interval   UREA NITROGEN (BUN) 56 mg/dL H 7-25   CREATININE 2 96 mg/dL H 0 70-1 11   For patients >52years of age, the reference limit  for Creatinine is approximately 13% higher for people  identified as -American  eGFR NON-AFR  AMERICAN 18 mL/min/1 73m2 L > OR = 60   eGFR AFRICAN AMERICAN 21 mL/min/1 73m2 L > OR = 60   BUN/CREATININE RATIO 19 (calc)  6-22   SODIUM 143 mmol/L  135-146   POTASSIUM 4 8 mmol/L  3 5-5 3   CHLORIDE 101 mmol/L     CARBON DIOXIDE 33 mmol/L H 20-31   CALCIUM 9 3 mg/dL  8 6-10 3       Assessment    1  Chronic renal insufficiency (585 9) (N18 9)    Plan  Chronic renal insufficiency    · (1) BASIC METABOLIC PROFILE; Status:Active; Requested for:29Awd2115;    Perform:[facility]; RYL:44MZP1861; Ordered; For:Chronic renal insufficiency; Ordered By:Phillip Odom;   · (1) BASIC METABOLIC PROFILE; Status:Active; Requested for:35Zlt7049;    Perform:[facility]; OPB:44TDW2718; Ordered; For:Chronic renal insufficiency; Ordered By:Phillip Odom;    As we reviewed your creatinine is 2 9 and it tends to run in the twos  I feel this is mostly related to some aging in the kidney and related to your heart condition and aortic stenosis which is the valvular condition of the heart 2  You look great you have no symptoms no complaints you're not swollen your breathing comfortably so just continue all your medications and we'll continue to follow  Discussion/Summary  Patient's 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 along with fluctuations due to systolic dysfunction and aortic valvular disease  He seems very well compensated clinically but his ejection fraction last estimated was about 30% and he has significant aortic stenosis   This can affect the cardiac output and can see fluctuations renal function but it doesn't appear he really has aggressive intrinsic disease  For now he is well compensated clinically has no swelling or symptoms is very satisfied with the way his life  Continue medical therapy  We'll see back in 4 months with repeat labs [facility] and told him to contact me if there's any problem  He is going to contact me with the police taking 3 times a week second make sure it's safe for the kidneys  Other than that he is seeing a dermatologist for the lesion on his face as well  Future Appointments    Signatures   Electronically signed by :  GUSTAVO Puente ; Mar  9 2017 11:35AM EST                       (Author)

## 2018-01-09 NOTE — RESULT NOTES
Verified Results  (1) CBC/PLT/DIFF [id number] 03:11PM Irlanda Rahman     Test Name Result Flag Reference   WBC COUNT 8 65 Thousand/uL  4 31-10 16   RBC COUNT 4 34 Million/uL  3 88-5 62   HEMOGLOBIN 13 5 g/dL  12 0-17 0   HEMATOCRIT 42 8 %  36 5-49 3   MCV 99 fL H 82-98   MCH 31 1 pg  26 8-34 3   MCHC 31 5 g/dL  31 4-37 4   RDW 13 6 %  11 6-15 1   MPV 11 1 fL  8 9-12 7   PLATELET COUNT 171 Thousands/uL  149-390   nRBC AUTOMATED 0 /100 WBCs     NEUTROPHILS RELATIVE PERCENT 75 %  43-75   LYMPHOCYTES RELATIVE PERCENT 11 % L 14-44   MONOCYTES RELATIVE PERCENT 11 %  4-12   EOSINOPHILS RELATIVE PERCENT 2 %  0-6   BASOPHILS RELATIVE PERCENT 1 %  0-1   NEUTROPHILS ABSOLUTE COUNT 6 52 Thousands/?L  1 85-7 62   LYMPHOCYTES ABSOLUTE COUNT 0 94 Thousands/?L  0 60-4 47   MONOCYTES ABSOLUTE COUNT 0 99 Thousand/?L  0 17-1 22   EOSINOPHILS ABSOLUTE COUNT 0 15 Thousand/?L  0 00-0 61   BASOPHILS ABSOLUTE COUNT 0 04 Thousands/?L  0 00-0 10     (1) URIC ACID [id number] 03:11PM Irlanda Rahman     Test Name Result Flag Reference   URIC ACID 12 3 mg/dL H 4 2-8 0   Specimen collection should occur prior to Metamizole administration due to the potential for falsely depressed results  (1) COMPREHENSIVE METABOLIC PANEL 00YRP1036 14:58FE Irlanda Rahman     Test Name Result Flag Reference   GLUCOSE,RANDM 96 mg/dL     If the patient is fasting, the ADA then defines impaired fasting glucose as > 100 mg/dL and diabetes as > or equal to 123 mg/dL     SODIUM 142 mmol/L  136-145   POTASSIUM 4 7 mmol/L  3 5-5 3   CHLORIDE 100 mmol/L  100-108   CARBON DIOXIDE 36 mmol/L H 21-32   ANION GAP (CALC) 6 mmol/L  4-13   BLOOD UREA NITROGEN 64 mg/dL H 5-25   CREATININE 2 67 mg/dL H 0 60-1 30   Standardized to IDMS reference method   CALCIUM 9 2 mg/dL  8 3-10 1   BILI, TOTAL 0 36 mg/dL  0 20-1 00   ALK PHOSPHATAS 68 U/L     ALT (SGPT) 18 U/L  12-78   AST(SGOT) 11 U/L  5-45   ALBUMIN 3 6 g/dL  3 5-5 0   TOTAL PROTEIN 7 6 g/dL  6 4-8 2   eGFR Non-African American 22 8 ml/min/1 73sq m     [facility] Disease Education Program recommendations are as follows:  GFR calculation is accurate only with a steady state creatinine  Chronic Kidney disease less than 60 ml/min/1 73 sq  meters  Kidney failure less than 15 ml/min/1 73 sq  meters       (1) SED RATE [id number] 03:11PM Alix Marquez     Test Name Result Flag Reference   SED RATE 11 mm/hour H 0-10

## 2018-01-09 NOTE — RESULT NOTES
Verified Results  (1) CBC/PLT/DIFF [id number] 03:11PM Jesus [employer]     Test Name Result Flag Reference   WBC COUNT 8 65 Thousand/uL  4 31-10 16   RBC COUNT 4 34 Million/uL  3 88-5 62   HEMOGLOBIN 13 5 g/dL  12 0-17 0   HEMATOCRIT 42 8 %  36 5-49 3   MCV 99 fL H 82-98   MCH 31 1 pg  26 8-34 3   MCHC 31 5 g/dL  31 4-37 4   RDW 13 6 %  11 6-15 1   MPV 11 1 fL  8 9-12 7   PLATELET COUNT 176 Thousands/uL  149-390   nRBC AUTOMATED 0 /100 WBCs     NEUTROPHILS RELATIVE PERCENT 75 %  43-75   LYMPHOCYTES RELATIVE PERCENT 11 % L 14-44   MONOCYTES RELATIVE PERCENT 11 %  4-12   EOSINOPHILS RELATIVE PERCENT 2 %  0-6   BASOPHILS RELATIVE PERCENT 1 %  0-1   NEUTROPHILS ABSOLUTE COUNT 6 52 Thousands/?L  1 85-7 62   LYMPHOCYTES ABSOLUTE COUNT 0 94 Thousands/?L  0 60-4 47   MONOCYTES ABSOLUTE COUNT 0 99 Thousand/?L  0 17-1 22   EOSINOPHILS ABSOLUTE COUNT 0 15 Thousand/?L  0 00-0 61   BASOPHILS ABSOLUTE COUNT 0 04 Thousands/?L  0 00-0 10     (1) URIC ACID [id number] 03:11PM Jesus Alberto [employer]     Test Name Result Flag Reference   URIC ACID 12 3 mg/dL H 4 2-8 0   Specimen collection should occur prior to Metamizole administration due to the potential for falsely depressed results  (1) COMPREHENSIVE METABOLIC PANEL [id number] 37:84YX Jesus Alberto [employer]     Test Name Result Flag Reference   GLUCOSE,RANDM 96 mg/dL     If the patient is fasting, the ADA then defines impaired fasting glucose as > 100 mg/dL and diabetes as > or equal to 123 mg/dL     SODIUM 142 mmol/L  136-145   POTASSIUM 4 7 mmol/L  3 5-5 3   CHLORIDE 100 mmol/L  100-108   CARBON DIOXIDE 36 mmol/L H 21-32   ANION GAP (CALC) 6 mmol/L  4-13   BLOOD UREA NITROGEN 64 mg/dL H 5-25   CREATININE 2 67 mg/dL H 0 60-1 30   Standardized to IDMS reference method   CALCIUM 9 2 mg/dL  8 3-10 1   BILI, TOTAL 0 36 mg/dL  0 20-1 00   ALK PHOSPHATAS 68 U/L     ALT (SGPT) 18 U/L  12-78   AST(SGOT) 11 U/L  5-45   ALBUMIN 3 6 g/dL  3 5-5 0   TOTAL PROTEIN 7 6 g/dL  6 4-8 2   eGFR Non-African American 22 8 ml/min/1 73sq m     [facility] Disease Education Program recommendations are as follows:  GFR calculation is accurate only with a steady state creatinine  Chronic Kidney disease less than 60 ml/min/1 73 sq  meters  Kidney failure less than 15 ml/min/1 73 sq  meters  (1) SED RATE [id number] 03:11PM Mray Carmen Mata     Test Name Result Flag Reference   SED RATE 11 mm/hour H 0-10     * XR FOOT 3+ VIEW LEFT [id number] 12:36PM Scooetrmely Samaniego Order Number: [id number]     Test Name Result Flag Reference   XR FOOT 3+ VW LEFT (Report)     LEFT FOOT     INDICATION: Lateral dorsal foot pain erythema  COMPARISON: None     VIEWS: 3; 3 images     FINDINGS:     There is no acute fracture or dislocation  Degenerative changes are seen within the first MTP joint  Additional large calcaneal heel spur is noted  No lytic or blastic lesions are seen  Vascular calcifications are noted throughout the foot  IMPRESSION:     No acute osseous abnormality  Workstation performed: [id number]     Signed by:   Lynnda Merlin, MD   2/1/17       Plan  Left foot pain    · Indomethacin 50 MG Oral Capsule; TAKE 1 CAPSULE 3 TIMES DAILY WITH  FOOD   Taper quickly according to resolution of foot pain

## 2018-01-10 NOTE — MISCELLANEOUS
Provider Comments  Provider Comments:   Patient was a no show for today's appointment  Nii Foil      Signatures   Electronically signed by :  GUSTAVO Trujillo ; Jul 18 2016  1:43PM EST

## 2018-01-10 NOTE — RESULT NOTES
Verified Results  (1) CBC/PLT/DIFF [id number] 03:13PM Ronda Muniz Order Number: [id number]  TW Order Number: [id number]  TW Order Number: [id number] Order Number: [id number]     Test Name Result Flag Reference   WBC COUNT 6 62 Thousand/uL  4 31-10 16   RBC COUNT 3 54 Million/uL L 3 88-5 62   HEMOGLOBIN 10 0 g/dL L 12 0-17 0   HEMATOCRIT 32 9 % L 36 5-49 3   MCV 93 fL  82-98   MCH 28 2 pg  26 8-34 3   MCHC 30 4 g/dL L 31 4-37 4   RDW 16 0 % H 11 6-15 1   MPV 11 3 fL  8 9-12 7   PLATELET COUNT 631 Thousands/uL  149-390   nRBC AUTOMATED 0 /100 WBCs     NEUTROPHILS RELATIVE PERCENT 71 %  43-75   LYMPHOCYTES RELATIVE PERCENT 14 %  14-44   MONOCYTES RELATIVE PERCENT 12 %  4-12   EOSINOPHILS RELATIVE PERCENT 2 %  0-6   BASOPHILS RELATIVE PERCENT 1 %  0-1   NEUTROPHILS ABSOLUTE COUNT 4 68 Thousands/?L  1 85-7 62   LYMPHOCYTES ABSOLUTE COUNT 0 93 Thousands/?L  0 60-4 47   MONOCYTES ABSOLUTE COUNT 0 79 Thousand/?L  0 17-1 22   EOSINOPHILS ABSOLUTE COUNT 0 16 Thousand/?L  0 00-0 61   BASOPHILS ABSOLUTE COUNT 0 05 Thousands/?L  0 00-0 10

## 2018-01-11 NOTE — RESULT NOTES
Verified Results  (1) TSH WITH FT4 REFLEX 12Jan2017 01:31PM Balbir Granger   TW Order Number: [id number]     Test Name Result Flag Reference   TSH 1 830 uIU/mL  0 358-3 740   Patients undergoing fluorescein dye angiography may retain small amounts of fluorescein in the body for 48-72 hours post procedure  Samples containing fluorescein can produce falsely depressed TSH values  If the patient had this procedure,a specimen should be resubmitted post fluorescein clearance

## 2018-01-11 NOTE — RESULT NOTES
Verified Results  (1) CBC/PLT/DIFF [id number] 01:47PM Marisa Leal Order Number: [id number]    TW Order Number: [id number]     Test Name Result Flag Reference   WBC COUNT 7 82 Thousand/uL  4 31-10 16   RBC COUNT 3 13 Million/uL L 3 88-5 62   HEMOGLOBIN 8 9 g/dL L 12 0-17 0   HEMATOCRIT 30 1 % L 36 5-49 3   MCV 96 fL  82-98   MCH 28 4 pg  26 8-34 3   MCHC 29 6 g/dL L 31 4-37 4   RDW 17 0 % H 11 6-15 1   MPV 10 7 fL  8 9-12 7   PLATELET COUNT 238 Thousands/uL  149-390   nRBC AUTOMATED 0 /100 WBCs     NEUTROPHILS RELATIVE PERCENT 75 %  43-75   LYMPHOCYTES RELATIVE PERCENT 11 % L 14-44   MONOCYTES RELATIVE PERCENT 11 %  4-12   EOSINOPHILS RELATIVE PERCENT 2 %  0-6   BASOPHILS RELATIVE PERCENT 1 %  0-1   NEUTROPHILS ABSOLUTE COUNT 5 94 Thousands/µL  1 85-7 62   LYMPHOCYTES ABSOLUTE COUNT 0 84 Thousands/µL  0 60-4 47   MONOCYTES ABSOLUTE COUNT 0 84 Thousand/µL  0 17-1 22   EOSINOPHILS ABSOLUTE COUNT 0 15 Thousand/µL  0 00-0 61   BASOPHILS ABSOLUTE COUNT 0 04 Thousands/µL  0 00-0 10   WBC COUNT 7 82 Thousand/uL  4 31-10 16   RBC COUNT 3 13 Million/uL L 3 88-5 62   HEMOGLOBIN 8 9 g/dL L 12 0-17 0   HEMATOCRIT 30 1 % L 36 5-49 3   MCV 96 fL  82-98   MCH 28 4 pg  26 8-34 3   MCHC 29 6 g/dL L 31 4-37 4   RDW 17 0 % H 11 6-15 1   MPV 10 7 fL  8 9-12 7   PLATELET COUNT 039 Thousands/uL  149-390   nRBC AUTOMATED 0 /100 WBCs     NEUTROPHILS RELATIVE PERCENT 75 %  43-75   LYMPHOCYTES RELATIVE PERCENT 11 % L 14-44   MONOCYTES RELATIVE PERCENT 11 %  4-12   EOSINOPHILS RELATIVE PERCENT 2 %  0-6   BASOPHILS RELATIVE PERCENT 1 %  0-1   NEUTROPHILS ABSOLUTE COUNT 5 94 Thousands/µL  1 85-7 62   LYMPHOCYTES ABSOLUTE COUNT 0 84 Thousands/µL  0 60-4 47   MONOCYTES ABSOLUTE COUNT 0 84 Thousand/µL  0 17-1 22   EOSINOPHILS ABSOLUTE COUNT 0 15 Thousand/µL  0 00-0 61   BASOPHILS ABSOLUTE COUNT 0 04 Thousands/µL  0 00-0 10                 (1) COMPREHENSIVE METABOLIC PANEL [id number] 96:18HP Esperanza Samm   TW Order Number: [id number]      National Kidney Disease Education Program recommendations are as follows:  GFR calculation is accurate only with a steady state creatinine  Chronic Kidney disease less than 60 ml/min/1 73 sq  meters  Kidney failure less than 15 ml/min/1 73 sq  meters  Test Name Result Flag Reference   GLUCOSE,RANDM 77 mg/dL     If the patient is fasting, the ADA then defines impaired fasting glucose as > 100 mg/dL and diabetes as > or equal to 123 mg/dL  SODIUM 140 mmol/L  136-145   POTASSIUM 5 2 mmol/L  3 5-5 3   CHLORIDE 106 mmol/L  100-108   CARBON DIOXIDE 29 mmol/L  21-32   ANION GAP (CALC) 5 mmol/L  4-13   BLOOD UREA NITROGEN 34 mg/dL H 5-25   CREATININE 1 59 mg/dL H 0 60-1 30   Standardized to IDMS reference method   CALCIUM 8 0 mg/dL L 8 3-10 1   BILI, TOTAL 0 27 mg/dL  0 20-1 00   ALK PHOSPHATAS 79 U/L     ALT (SGPT) 15 U/L  12-78   AST(SGOT) 17 U/L  5-45   ALBUMIN 2 6 g/dL L 3 5-5 0   TOTAL PROTEIN 6 6 g/dL  6 4-8 2   eGFR Non-African American 41 5 ml/min/1 73sq m     GLUCOSE,RANDM 77 mg/dL     If the patient is fasting, the ADA then defines impaired fasting glucose as > 100 mg/dL and diabetes as > or equal to 123 mg/dL     SODIUM 140 mmol/L  136-145   POTASSIUM 5 2 mmol/L  3 5-5 3   CHLORIDE 106 mmol/L  100-108   CARBON DIOXIDE 29 mmol/L  21-32   ANION GAP (CALC) 5 mmol/L  4-13   BLOOD UREA NITROGEN 34 mg/dL H 5-25   CREATININE 1 59 mg/dL H 0 60-1 30   Standardized to IDMS reference method   CALCIUM 8 0 mg/dL L 8 3-10 1   BILI, TOTAL 0 27 mg/dL  0 20-1 00   ALK PHOSPHATAS 79 U/L     ALT (SGPT) 15 U/L  12-78   AST(SGOT) 17 U/L  5-45   ALBUMIN 2 6 g/dL L 3 5-5 0   TOTAL PROTEIN 6 6 g/dL  6 4-8 2   eGFR Non-African American 41 5 ml/min/1 73sq m                   (1) TSH WITH FT4 REFLEX 02Mar2016 01:47PM Refugio Hash Order Number: [id number]     Test Name Result Flag Reference   TSH 4 200 uIU/mL H 0 358-3 740   T4,FREE 0 70 ng/dL L 0 76-1 46   TSH 4 200 uIU/mL H 0 358-3 740 T4,FREE 0 70 ng/dL L 0 76-1 46                   Plan  Iron deficiency anemia    · (1) CBC/PLT/DIFF; Status:Active; Requested for:28Apr2016;    · (1) IRON; Status:Active; Requested for:28Apr2016; Thyroid disorder    · Levothyroxine Sodium 50 MCG Oral Tablet; Take 1 tablet daily   · (1) TSH WITH FT4 REFLEX; Status:Active;  Requested for:28Apr2016;

## 2018-01-12 NOTE — MISCELLANEOUS
Message   Recorded as Task   Date: 09/01/2016 09:59 AM, Created By: Harman Meade   Task Name: Miscellaneous   Assigned To: Darren Donato   Regarding Patient: Spike Webster, Status: Active   CommentVilinda Shores - 01 Sep 2016 9:59 AM     TASK CREATED  Caller: son  PC from [place]'s son, who he lives with, stating that since starting the Carvedilol 3 125 mgs BID, he is experiencing lethargy, nausea, constipation, lack of appetite, with weight loss  His son thinks it is too much for him  Do you want to decrease to qd or please advise  [address] THIS AM AND FEELS REMARKABLY BETTER  ADVISED D/C COREG COMPLETELY  HE STATES PT DOING FIND ON FUROSEMIDE  WILL CONTINUE THE SAME  PT'S SON ASKED TO CALL NEXT WEEK TO UPDATE US ON PT'S PROGRESS   HE WILL DO SO       Signatures   Electronically signed by : Lee Fan DO; Sep  1 2016  4:23PM EST                       (Author)

## 2018-01-12 NOTE — MISCELLANEOUS
Message   Recorded as Task   Date: 10/06/2016 03:56 PM, Created By: Johann Harrison   Task Name: Call Back   Assigned To: Lennox Ely   Regarding Patient: Montez Pendleton, Status: Active   CommentRosejose Nj - 06 Oct 2016 3:56 PM     TASK CREATED  Caller: son  PC from [place]'s son, who was transporting [place] [address] home from a coming home from a Dr 's appointment  While riding in the passenger seat, he "passed out twice"  The first time was for about three seconds and then a few minutes later he passed out again for about 5 seconds  [place] [address] said he felt fine except he "felt a little hot"  He is at home now and they put him to bed and he is sitting up and feeling fine  No other symptoms at all  He was coming from a routine  Nephrology appt  His BP was a little lower than it usually runs at 120/70  Son called wondering what was happening to him  [phone]   CALLED AND SPOKE W/ PT'S SON, TOBIAS---HE THINKS HIS FATHER MAY HAVE PASSED OUT AS HE DIDN'T HAVE ANYTHING TO EAT AFTER BREAKFAST UP UNTIL 4PM  I HAD A LONG DISCUSSION W/ HIM OVER THE PHONE--MY CONCERN IS THAT THIS 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  RECOMMENDED PT COME TO ED TODAY  HE STATES THEY HAVE "NOT TALKED ABOUT ICD IIMPLANTATION" SINCE THEY LEFT MY OFFICE IN AUGUST, SO DO NOT KNOW IF THEIR FATHER WANTS AN ICD OR INVASIVE TESTIN/INTERVENTION  I ASKED HIM TO SPEAK W/ HIS FATHER ABOUT CONCERN OF POSSIBLE VTACH RESULTING IN SYNCOPE IN THE SETTING OF CARDIOMYOPATHY AND IT WOULD BE SAFER TO COME TO ED  RISK OF RECURRENT SYNCOPE AND SUDDEN DEATH IS HIGHER  IF HE ABSOLUTELY REFUSES, RECOMMENDED HIS SON TO KEEP VERY CLOSE EYE ON HIM THROUGH WEEKEND AND CALL 911 IF ANY RECURRENT PRESYNCOPE OR SYNCOPE OR ANY SYMPTOMS  WILL SEE PT NEXT WEEK OTHERWISE AND DISCUSS OPTION FOR ICD IMPLANTATION  HE IS AGREEABLE TO THIS APPROACH        Signatures   Electronically signed by : Dalia Cerna DO; Oct  7 2016 11:18AM EST (Author)

## 2018-01-12 NOTE — PROGRESS NOTES
Assessment    1  Chronic renal insufficiency (585 9) (N18 9)    Plan  Chronic renal insufficiency    · (1) BASIC METABOLIC PROFILE; Status:Active; Requested for:18Hqc9970;    Perform:[facility]; DE:73XOZ9225; Ordered; For:Chronic renal insufficiency; Ordered By:Phillip Odom;   · (1) BASIC METABOLIC PROFILE; Status:Active; Requested for:91Ett0109;    Perform:[facility]; XPD:80IKA2551; Ordered; For:Chronic renal insufficiency; Ordered By:Phillip Odom;    As we reviewed your creatinine is 2 9 and it tends to run in the twos  I feel this is mostly related to some aging in the kidney and related to your heart condition and aortic stenosis which is the valvular condition of the heart 2  You look great you have no symptoms no complaints you're not swollen your breathing comfortably so just continue all your medications and we'll continue to follow  Discussion/Summary  Patient's 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 along with fluctuations due to systolic dysfunction and aortic valvular disease  He seems very well compensated clinically but his ejection fraction last estimated was about 30% and he has significant aortic stenosis  This can affect the cardiac output and can see fluctuations renal function but it doesn't appear he really has aggressive intrinsic disease  For now he is well compensated clinically has no swelling or symptoms is very satisfied with the way his life  Continue medical therapy  We'll see back in 4 months with repeat labs correction and told him to contact me if there's any problem  He is going to contact me with the police taking 3 times a week second make sure it's safe for the kidneys  Other than that he is seeing a dermatologist for the lesion on his face as well  Reason For Visit  You're here for follow-up of chronic kidney disease        History of Present Illness  The patient here for follow-up of chronic kidney disease   Since last seen his been doing well with no hospitalizations or intercurrent illnesses  He's had no real changes in medication although it seems that he had an attack of pseudogout in his foot and was placed on indomethacin at some point  He's now not taking it but says he's taking medication 3 times a week and is unsure when it's called  Review of Systems    Constitutional: no fever, no chills, no anorexia and no fatigue  Integumentary: Nonhealing lesion left cheek  Gastrointestinal: no abdominal pain, no nausea, no diarrhea and no vomiting  Respiratory: no shortness of breath, no cough and not coughing up sputum  Cardiovascular: no orthopnea, no chest pain, no palpitations and no lower extremity edema  Neurological: No complaints of headache, no lightheadedness or dizziness  Genitourinary: no dysuria, no hematuria and no incomplete emptying of bladder  Eyes: No complaints of eyesight problems or dryness of eyes  Current Meds   1  Aspirin 81 MG Oral Tablet Delayed Release; TAKE 1 TABLET DAILY AS DIRECTED; Therapy: 73JBG0354 to Recorded   2  Carvedilol 3 125 MG Oral Tablet; TAKE 1 TABLET TWICE DAILY  Requested for:   61KOA4679; Last Rx:02Mar2017 Ordered   3  Furosemide 20 MG Oral Tablet; TAKE 1 TABLET TWICE DAILY; Therapy: 24Dkt4043 to (Evaluate:11Vam5714)  Requested for: 44FIV6724; Last   Rx:12Nne5247 Ordered   4  iFerex 150 150 MG Oral Capsule; Take 1 capsule twice daily; Therapy: 64VMQ8232 to (Last Rx:06Jan2017)  Requested for: 08PHL0964 Ordered   5  Levothyroxine Sodium 50 MCG Oral Tablet; Take 1 tablet daily; Therapy: 23AUX6916 to [facility]  Requested for: 04FAM6561; Last   Rx:05Jan2017 Ordered   6  Pantoprazole Sodium 40 MG Oral Tablet Delayed Release; take one tablet by mouth   twice daily; Therapy: 04KPY2810 to (Evaluate:68Gyd1025)  Requested for: 81KIH2582; Last   Rx:03Mar2017 Ordered    Allergies    1   No Known Drug Allergies    Vitals  Vital Signs    Recorded: 83RDP6439 "11:33AM Recorded: 45NGR2817 10:55AM   Heart Rate 68    Respiration 16    Systolic 071    Diastolic 52    Height  5 ft 8 in   Weight  150 lb    BMI Calculated  22 81   BSA Calculated  1 81     Physical Exam    Constitutional: General appearance: No acute distress, well appearing and well nourished  Eyes: Anicteric sclerae  JVD:  No JVD present  Pulmonary: Respiratory effort: No increased work of breathing or signs of respiratory distress  Auscultation of lungs: Clear to auscultation  Cardiovascular: Auscultation of heart: Abnormal   4/6 systolic murmur  Abdomen: Non-tender, no masses  Extremities: No cyanosis, clubbing or edema  Neurologic: Non Focal      Psychiatric: Orientation to person, place, and time: Normal        Results/Data  Cardiac Device In Clinic 17LDY9359 07:58PM Donte Bello     Test Name Result Flag Reference   MISCELLANEOUS COMMENT (Report)     DEVICE INTERROGATED IN THE [facility] OFFICE  BATTERY VOLTAGE ADEQUATE (9 1 YRS)  AP 91%  <1%  ALL LEAD PARAMETERS WITHIN NORMAL LIMITS  2 AMS EPISODES UP TO 10 SEC = PAT WITH RATES   BPM  NO OTHER SIGNIFICANT HIGH RATE EPISODES  RA CAP CONFIRM PROG "ON, RV AUTO-CAPT PROG "ON", PROGRAMMED TO DEVICE CLINIC DIAGNOSTIC SETTINGS  PACEMAKER FUNCTIONING APPROPRIATELY  CP   Cardiac Electrophysiology Report      [id number]  pdf   DEVICE TYPE Pacemaker       Cardiac Electrophysiology Report [id number] 07:58PM Donte Bello     Test Name Result Flag Reference   Cardiac Electrophysiology Report      [id number]  pdf     (1) BASIC METABOLIC PROFILE 23RWP7435 11:22AM Jaime Mccrary   REPORT COMMENT:  FASTING:NO     Test Name Result Flag Reference   GLUCOSE 113 mg/dL H 65-99   Fasting reference interval   UREA NITROGEN (BUN) 56 mg/dL H 7-25 " CREATININE 2 96 mg/dL H 0 70-1 11   For patients >52years of age, the reference limit  for Creatinine is approximately 13% higher for people  identified as -American  eGFR NON-AFR  AMERICAN 18 mL/min/1 73m2 L > OR = 60   eGFR AFRICAN AMERICAN 21 mL/min/1 73m2 L > OR = 60   BUN/CREATININE RATIO 19 (calc)  6-22   SODIUM 143 mmol/L  135-146   POTASSIUM 4 8 mmol/L  3 5-5 3   CHLORIDE 101 mmol/L     CARBON DIOXIDE 33 mmol/L H 20-31   CALCIUM 9 3 mg/dL  8 6-10 3       Future Appointments    Date/Time Provider Specialty Site   03/13/2018 01:00 PM Cardiology, Device Clinic [facility] CARDIOLOGY  [place]   06/08/2017 10:30 AM Cardiology, Device Remote   [address]   09/11/2017 11:00 AM Cardiology, Device Remote   [address]   12/12/2017 01:00 PM Cardiology, Device Remote   [address]   03/22/2017 01:20 PM Vivien Lucero DO Cardiology  CARDIOLOGY  [place]     Signatures   Electronically signed by :  GUSTAVO To ; Mar  9 2017 11:35AM EST                       (Author)

## 2018-01-12 NOTE — CONSULTS
Reason For Visit  follow up chronic kidney disease        History of Present Illness  Here for routine follow up  No acute complaints  No changes in medications  Review of Systems    Constitutional: no fever, no chills and no anorexia  Gastrointestinal: no abdominal pain, no nausea, no diarrhea and no vomiting  Respiratory: no shortness of breath and no cough  Cardiovascular: no orthopnea, no chest pain and no lower extremity edema  Neurological: no headache, no lightheadedness and no dizziness  Genitourinary: nocturia and occasional incontinence ( stress ), but no dysuria  Current Meds   1  Aspirin 81 MG Oral Tablet Delayed Release; TAKE 1 TABLET DAILY AS DIRECTED; Therapy: 56EAR7300 to Recorded   2  Calcitriol 0 25 MCG Oral Capsule; TAKE ONE CAPSULE BY MOUTH ONCE DAILY; Therapy: 76JES4963 to (Evaluate:31Mar2018)  Requested for: 66MIF8812; Last   Rx:02Oct2017 Ordered   3  Carvedilol 3 125 MG Oral Tablet; TAKE 1 TABLET TWICE DAILY  Requested for:   55AGB3437; Last Rx:84Dpm3235 Ordered   4  Ferrex 150 150 MG Oral Capsule; TAKE ONE CAPSULE BY MOUTH TWICE DAILY; Therapy: 59Saf4444 to (Evaluate:50Iag5572)  Requested for: 78Osw7088; Last   Rx:08Wth4073 Ordered   5  Furosemide 20 MG Oral Tablet; TAKE 1 TABLET TWICE DAILY; Therapy: 62Ohn4302 to (Luis M Stevenson)  Requested for: 78Lyu5594; Last   Rx:04Fap7050 Ordered   6  iFerex 150 150 MG Oral Capsule; Take 1 capsule twice daily; Therapy: 35OWS3609 to (Last Rx:06Xjt8877)  Requested for: 52Ifh8116 Ordered   7  Levothyroxine Sodium 50 MCG Oral Tablet; TAKE ONE TABLET BY MOUTH ONCE DAILY; Therapy: 94ANK7541 to (Evaluate:31Mar2018)  Requested for: 02Oct2017; Last   Rx:90Znq4321 Ordered   8  Pantoprazole Sodium 40 MG Oral Tablet Delayed Release; take one tablet by mouth   twice daily; Therapy: 55RXB0819 to (Evaluate:02Zen3281)  Requested for: 45Pgc8695; Last   Rx:48Uqn8370 Ordered    The medication list was reviewed and updated today  Allergies    1  No Known Drug Allergies    Vitals   Recorded: 51WIX4720 03:32PM Recorded: 52AUH0932 03:10PM   Heart Rate 62    Respiration 16    Systolic 848    Diastolic 78    Height  5 ft 8 in   Weight  154 lb    BMI Calculated  23 42   BSA Calculated  1 83     Physical Exam    Constitutional: General appearance: No acute distress, well appearing and well nourished  ENT: External ears and nose appear normal      Eyes: Anicteric sclerae  JVD:  No JVD present  Pulmonary: Respiratory effort: No increased work of breathing or signs of respiratory distress  Auscultation of lungs: Clear to auscultation  Cardiovascular: 2-7/8 systolic murmur Regular  Abdomen: Non-tender, no masses  Extremities: No cyanosis, clubbing or edema  Neurologic: Non Focal      Psychiatric: Orientation to person, place, and time: Normal        Results/Data  (1) BASIC METABOLIC PROFILE 08NUJ0380 10:57AM Saima Cee     Test Name Result Flag Reference   GLUCOSE 120 mg/dL H 65-99   Fasting reference interval     For someone without known diabetes, a glucose value  between 100 and 125 mg/dL is consistent with  prediabetes and should be confirmed with a  follow-up test    UREA NITROGEN (BUN) 63 mg/dL H 7-25   CREATININE 2 90 mg/dL H 0 70-1 11   For patients >52years of age, the reference limit  for Creatinine is approximately 13% higher for people  identified as -American  eGFR NON-AFR   AMERICAN 18 mL/min/1 73m2 L > OR = 60   eGFR AFRICAN AMERICAN 21 mL/min/1 73m2 L > OR = 60   BUN/CREATININE RATIO 22 (calc)  6-22   SODIUM 143 mmol/L  135-146   POTASSIUM 4 8 mmol/L  3 5-5 3   CHLORIDE 102 mmol/L     CARBON DIOXIDE 30 mmol/L  20-31   CALCIUM 9 1 mg/dL  8 6-10 3     (1) CBC/PLT/DIFF 26OOU5591 10:57AM Phillip Odom   REPORT COMMENT:  FASTING:NO     Test Name Result Flag Reference   WHITE BLOOD CELL COUNT 6 5 Thousand/uL  3 8-10 8   RED BLOOD CELL COUNT 4 10 Million/uL L 4 20-5 80   HEMOGLOBIN 12 5 g/dL L 13 2-17 1 HEMATOCRIT 37 8 % L 38 5-50 0   MCV 92 2 fL  80 0-100 0   MCH 30 5 pg  27 0-33 0   MCHC 33 1 g/dL  32 0-36 0   RDW 12 4 %  11 0-15 0   PLATELET COUNT 673 Thousand/uL  140-400   ABSOLUTE NEUTROPHILS 4791 cells/uL  3561-4364   ABSOLUTE LYMPHOCYTES 826 cells/uL L 850-3900   ABSOLUTE MONOCYTES 748 cells/uL  200-950   ABSOLUTE EOSINOPHILS 78 cells/uL     ABSOLUTE BASOPHILS 59 cells/uL  0-200   NEUTROPHILS 73 7 %     LYMPHOCYTES 12 7 %     MONOCYTES 11 5 %     EOSINOPHILS 1 2 %     BASOPHILS 0 9 %     MPV 11 5 fL  7 5-12 5       Assessment    1  Chronic renal insufficiency (585 9) (N18 9)   2  Benign essential hypertension (401 1) (I10)    Plan  Chronic renal insufficiency    · (1) BASIC METABOLIC PROFILE; Status:Active; Requested for:02Apr2018; Perform:[facility]; HJQ:07DYY8688;BJVGYJK; For:Chronic renal insufficiency; Ordered By:Phillip Odom;   · (1) CBC/PLT/DIFF; Status:Active; Requested for:02Apr2018; Perform:[facility]; QWQ:67EAT3211;QQARNTM; For:Chronic renal insufficiency; Ordered By:Phillip Odom;    Stable kidney function  No changes today  Follow up as scheduled  Discussion/Summary  The creatinine is stable in patient baseline range  He has no volume overloaded and is well compensated from a CHF standpoint  Continue current dose of diuretics  No changes  Future Appointments    Signatures   Electronically signed by :  GUSTAVO Vang ; Nov 15 2017  3:33PM EST                       (Author)

## 2018-01-14 NOTE — MISCELLANEOUS
Message  Request from Dr Marietta Herrera to evaluate for the [facility] HF program  Presently the patient is without a high symptom burden and is not frequently hospitalized  Call made to the patient's son, Bard Du, and discussed why at this time the patient is not appropriate for this program  I did thought emphasize that I would be available to assist with any concerns and questions and when the patient's symptom burden does change we could re-evaluate enrollment in the [facility] HF program  I will make routine calls on a monthly basis to "check in" on patient's status  Active Problems    1  Abnormal thyroid blood test (794 5) (R94 6)   2  Acute gastritis with hemorrhage (535 01) (K29 01)   3  Anemia (285 9) (D64 9)   4  Aortic regurgitation (424 1) (I35 1)   5  Benign essential hypertension (401 1) (I10)   6  Chronic renal insufficiency (585 9) (N18 9)   7  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)   8  Congestive heart failure (428 0) (I50 9)   9  Fatigue (780 79) (R53 83)   10  Generalized weakness (780 79) (R53 1)   11  Impacted cerumen of both ears (380 4) (H61 23)   12  Insomnia (780 52) (G47 00)   13  Iron deficiency anemia (280 9) (D50 9)   14  Melena (578 1) (K92 1)   15  Moderate protein-calorie malnutrition (263 0) (E44 0)   16  Need for immunization against influenza (V04 81) (Z23)   17  Nocturia (788 43) (R35 1)   18  Pleural effusion on left (511 9) (J90)   19  Secondary pulmonary hypertension (416 8) (I27 2)   20  Severe anemia (285 9) (D64 9)   21  Severe aortic stenosis (424 1) (I35 0)   22  Thyroid disorder (246 9) (E07 9)    Current Meds   1  Aspirin 81 MG Oral Tablet Delayed Release; TAKE 1 TABLET DAILY AS DIRECTED; Therapy: 99WLU2770 to Recorded   2  Calcitriol 0 25 MCG Oral Capsule; take 1 capsule daily; Therapy: 09SBW1667 to (Last Rx:21Uts7263)  Requested for: 14AOH6953 Ordered   3  Carvedilol 3 125 MG Oral Tablet; Take 1 tablet twice daily;    Therapy: 21Wzt0920 to (Evaluate:06Dtf0713)  Requested for: 51Emz9798; Last   Rx:50Ajo0545 Ordered   4  Furosemide 40 MG Oral Tablet; Take 1 tablet daily; Therapy: 17Aud4528 to (Last Rx:11Fsf7069)  Requested for: 53Vnk8011 Ordered   5  iFerex 150 150 MG Oral Capsule; Take 1 capsule twice daily; Therapy: 34DSP1085 to (Last Rx:42Tqb8882)  Requested for: 94TGJ9428 Ordered   6  Levothyroxine Sodium 50 MCG Oral Tablet; Take 1 tablet daily; Therapy: 38NEA2949 to (Last Rx:01Xdw9706)  Requested for: 12ZTD5149 Ordered   7  Multivitamins TABS; TAKE 1 TABLET DAILY; Therapy: (Recorded:10Mar2016) to Recorded   8  Pantoprazole Sodium 40 MG Oral Tablet Delayed Release; TAKE 1 TABLET TWICE   DAILY; Therapy: 36NRS0872 to (Evaluate:26Oct2016)  Requested for: 29Apr2016; Last   Rx:29Apr2016 Ordered   9  Vitamin C 500 MG Oral Capsule; TAKE 1 CAPSULE DAILY; Therapy: (Recorded:10Mar2016) to Recorded   10  Vitamin D 1000 UNIT Oral Tablet; TAKE 1 TABLET DAILY; Therapy: 34HGU6620 to Recorded    Allergies    1   No Known Drug Allergies    Signatures   Electronically signed by : Aaron Reed, ; Aug 17 2016  2:40PM EST                       (Author)

## 2018-01-14 NOTE — RESULT NOTES
Message  Reviewed bloodwork--increased BUN/Cr/K levels  Called pt and spoke w/ son who states his father is doing ok, taking lasix 40 mg daily  Have advised he hold lasix x 4 days then start taking 20 mg daily  Will recheck BMP in 1 week, prior to nephrology appt  He is agreeable        Signatures   Electronically signed by : Mikaela Maria DO; Sep 28 2016  1:53PM EST                       (Author)

## 2018-01-15 NOTE — MISCELLANEOUS
Message  I called patient on 7/8/16 with results of his blood work, CBC still pending  I told patient that his iron level is up to 43 which is still low but slightly better than the last time  Unfortunately because CBC is still pending I cannot compare most recent hemoglobin level and see how bad his anemia is at present  My bigger concern at this point is the BNP that was checked due to his 11 pound weight gain in 2 months and history of CHF, aortic stenosis and pulmonary hypertension as well as chronic kidney disease, and his reported weakness and decreased energy  The BNP level was 34,599 which obviously is significantly elevated  I explained to patient my concern with his heart and even though he is asymptomatic I feel that he needs to consult with cardiology as to what is going on  I explained to patient what aortic stenosis is as well as congestive heart failure and pulmonary hypertension and that if the aortic valve is severely stenosed excessive blood and fluid can back up into the heart causing these issues  I told him that he should at least consider consultation with cardiology to figure out exactly what is going on and how they can help  Patient was very reluctant and I did review his previous PCP at our office Dr Claudio Arenas note from February 2016, and I see he was referred to both a nephrologist and cardiologist but patient never followed through with seeing a cardiologist  I told patient that certainly his weight gain can also be secondary to better eating post GI ulcer with hemorrhage, but he has not weighed this much since August 2013  I told him that I will not make any medication adjustments due to his kidney dysfunction  Also of note, patient states he was off of his levothyroxine and Calcitrol for about 2 weeks, but I don't feel that has any significant impact on what is currently going on and my current concerns   After much encouraging patient is willing to see cardiology and will refer him to [facility] on [employer]  I was able to call and schedule patient for an appointment with Dr Dandre Narvaez on 7/14/16 and was advised to be there at 3:20 PM  I will call patient and notify him of his upcoming appointment  All pertinent information, notes and testing are in EHR  I don't feel the patient needs to go to the hospital immediately as aside from his generalized weakness and fatigue he is asymptomatic denying chest pain, shortness of breath, chest tightness or productive cough        Signatures   Electronically signed by : Wei Capps, [facility]; Jul 8 2016  2:16PM EST                       (Author)

## 2018-01-15 NOTE — MISCELLANEOUS
Message  I had a call from Dr Riojas Prime - patient was admitted with syncope - probably secondary to bradycardia  Patient's Coreg was discontinued  He sees Dr Gonzalez Cons later this week  He also had a CXR which showed Left apical pneumothorax - stable - Dr Ramakrishna Jones thought it may be probable air trapping  He will see Dr Ramakrishna Jones in two weeks and needs a repeat CXR        Signatures   Electronically signed by : India Castelan DO; Oct 10 2016 11:02AM EST                       (Author)

## 2018-01-16 NOTE — MISCELLANEOUS
Message   Recorded as Task   Date: 04/25/2017 08:48 AM, Created By: Agata Jeffries   Task Name: Follow Up   Assigned To: Darren Donato   Regarding Patient: Logan Casas, Status: Active   Comment:    Catie Hartman - 25 Apr 2017 8:48 AM     TASK CREATED  FYI on remote pacemaker transmission:   [employer] TRANSMISSION ALERT: 1 NSVT EPISODE FOR 11 BEATS, AVG CL~345MS  EF-30% [facility] 8/12/16)  1 AF EPISODE LASTING 4 HRS ON 4/14/17  PT ON ASA 81MG & CARVEDILOL  Will address w/ Dr Elvi Tom since you are on vacation  Thanks  Catie Hartman - 25 Apr 2017 11:57 AM     TASK REASSIGNED: Previously Assigned To Darren Donato  This remote was actually reviewed w/ Dr Michelle Grewal, not Dr Elvi Tom  Thanks  REviewed case and task from pacer clinic  Pt w/ NSVT, is on BB  Reluctant to increase BB due to severe AS  AF x 4 hours noted as well  Called pt and discussed findings, as well as indications/risks/benefits of AC  Mutually, we've decided to hold off on [facility] [place] for this 1-time episode of AF, in light of h/o hemorrhagic gastritis resulting in hospitalization 2/2016  Will f/u as scheduled  Signatures   Electronically signed by : Liliana Rosario DO;  Apr 27 2017  2:02PM EST                       (Author)

## 2018-01-23 PROBLEM — R74.01 TRANSAMINITIS: Status: ACTIVE | Noted: 2018-01-01

## 2018-01-23 PROBLEM — I21.4 NSTEMI (NON-ST ELEVATED MYOCARDIAL INFARCTION) (HCC): Status: ACTIVE | Noted: 2018-01-01

## 2018-01-23 PROBLEM — I95.9 HYPOTENSION: Status: ACTIVE | Noted: 2018-01-01

## 2018-01-23 PROBLEM — E87.2 LACTIC ACIDOSIS: Status: ACTIVE | Noted: 2018-01-01

## 2018-01-23 NOTE — PROGRESS NOTES
Now with increased lactic acid level and patient appears cold and clammy  Consulted cardiology for possible cardiogenic shock  Will give IVF hydration as long as he is able to tolerate from a respiratory standpoint  If needed will start on Dobutamine   Cardiology spoke with family and now a dnr/dni

## 2018-01-23 NOTE — CONSULTS
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      DISCUSSION/RECOMMENDATIONS:  · OK w/ IVF for now--start IV dobutamine for additional BP support  · Had long d/w pt's son and wife--poor px from cardiac standpoint  They would like pt to be DNR/DNI after implications of this were reviewed with them  · Continue hemodynamic support w/ gentle IVF and consideration of Dobutamine gtt  May consider CVVH tomorrow if hemodynamics allow (d/w nephrology, Dr Eugenia Solano)  · Monitor lactic acid LFTs, renal fx, electrolytes  · Emperic Abx, infection workup  · Echo in AM  · Poor px  · D/W T  Magdi Grajeda and Dr Eugenia Solano    ======================================================    HPI:  I am seeing this patient in cardiology consultation for:  Congestive heart failure    Canda Dakins is a 80 y o  male with a significant history of severe cardiomyopathy, ejection fraction 30% with known severe aortic stenosis  He has declined open heart surgery and transcatheter aortic valve replacement in the past secondary to his age, and comorbidities including chronic kidney disease  He also has a history of syncope, with sick sinus syndrome, and status post dual-chamber permanent pacemaker November 2016 and paroxysmal atrial fibrillation, subclinical seen on device check  He has chosen to hold off on anticoagulation in light of a history of hemorrhagic gastritis during hospitalization February 2016    He was last seen by me in the office in November 2017 at which time he was doing well  He was compensated from a heart failure standpoint, with stable chronic exertional dyspnea, no symptoms of angina, maintaining sinus rhythm, and a stable weight around 150 lb  Since then, he has initiated dialysis  His son tells me that he has been quite fatigued over the past month, and over the past week started having worsening lower extremity edema  He has chronic orthopnea and shortness of breath which the patient's son tells me has been fairly stable  Today, after initiation of dialysis, he became hypotensive with ongoing fatigue and lethargy, and was brought to the emergency department  There, he was noted to have acute transaminitis with AST up to 502, , alkaline phosphatase 207  Total bilirubin was 3 2, with troponin 0 98, and lactic acid up to 6 3  He has been initiated on intravenous fluid by Nephrology  INR is 2 36  At this time he appears confused, denies pain, denies shortness of breath          Past Medical History:   Diagnosis Date    [facility] ) 12/27/2017    Aortic stenosis     severe    Cardiomyopathy ([facility] )     [facility]     CKD (chronic kidney disease) stage 3, GFR 30-59 ml/min     Combined systolic and diastolic heart failure [facility]     GERD (gastroesophageal reflux disease)     Heart murmur     Hyperkalemia 12/27/2017    Hypertension     Hypothyroidism     hypothyroid    Pulmonary hypertension     Pulmonary hypertension, moderate to severe     Severe aortic stenosis     Sinus pause     Syncope          Scheduled Meds:  calcium gluconate 50 mL IVPB 1 g Intravenous Once   cefepime 1,000 mg Intravenous Q24H   heparin (porcine) 5,000 Units Subcutaneous Q8H [facility]   [START ON 1/24/2018] levothyroxine 50 mcg Oral Early Morning   [START ON 1/24/2018] pantoprazole 40 mg Oral Daily Before Breakfast   sodium chloride 1,000 mL Intravenous Once   Followed by      sodium chloride 1,000 mL Intravenous Once   vancomycin 15 mg/kg Intravenous Once     Continuous Infusions:   PRN Meds:  No Known Allergies  I reviewed the Home Medication list in the chart  Family History   Problem Relation Age of Onset    Cancer Brother     Heart disease Mother        Social History     Social History    Marital status: /Civil Union     Spouse name: N/A    Number of children: N/A    Years of education: N/A     Occupational History     [employer]     Social History Main Topics    Smoking status: Former Smoker     Packs/day: 1 00     Years: 50 00    Smokeless tobacco: Never Used      Comment: quit 10 years ago    Alcohol use No    Drug use: No    Sexual activity: Not on file     Other Topics Concern    Not on file     Social History Narrative    No narrative on file       Review of Systems - as per HPI, all others reviewed and negative  Vitals:    01/23/18 1710   BP: (!) 86/54   Pulse: 74   Resp: 21   Temp:    SpO2:      I/O       01/21 0701 - 01/22 0700 01/22 0701 - 01/23 0700 01/23 0701 - 01/24 0700    IV Piggyback   500    Total Intake(mL/kg)   500 (7 4)    Net     +500           Unmeasured Stool Occurrence   1 x        Weight (last 2 days)     Date/Time   Weight    01/23/18 1621  67 5 (148 81)    01/23/18 1210  64 (141 09)              GEN: NAD, confused, lethargic, pale  HEENT: Mucus membranes dry, pale conjunctivae  EYES: Pupils equal, sclera anicteric  NECK: ++JVD/HJR, no carotid bruit  CARDIOVASCULAR: RRR, +ODALIS, rub, gallops S1,S2, +parasternal heave/thrill  LUNGS: Clear To auscultation bilaterally  ABDOMEN: Soft, nondistended, no hepatic systolic pulsation  EXTREMITIES/VASCULAR: +++ pitting edema    PSYCH: confused, somewhat disoriented  MSK:  Generally weak    EKG:  Sinus rhythm, LVH with repolarization abnormalities  TELE:  Sinus rhythm  CXR:  Clear lung fields    ECHO:  Ejection fraction 30% from August 2016 with severe aortic stenosis      Results from last 7 days  Lab Units 01/23/18  1230   WBC Thousand/uL 12 39* HEMOGLOBIN g/dL 12 7   HEMATOCRIT % 40 5   PLATELETS Thousands/uL 108*   NEUTROS PCT % 87*   MONOS PCT % 9       Results from last 7 days  Lab Units 01/23/18  1229   SODIUM mmol/L 138   POTASSIUM mmol/L 5 5*   CHLORIDE mmol/L 95*   CO2 mmol/L 26   BUN mg/dL 51*   CREATININE mg/dL 6 26*   GLUCOSE RANDOM mg/dL 113   CALCIUM mg/dL 8 0*       Results from last 7 days  Lab Units 01/23/18  1229   SODIUM mmol/L 138   POTASSIUM mmol/L 5 5*   CHLORIDE mmol/L 95*   CO2 mmol/L 26   BUN mg/dL 51*   CREATININE mg/dL 6 26*   CALCIUM mg/dL 8 0*   TOTAL PROTEIN g/dL 6 5   BILIRUBIN TOTAL mg/dL 3 21*   ALK PHOS U/L 207*   ALT U/L 292*   AST U/L 502*   GLUCOSE RANDOM mg/dL 113     No results found for: TROPONINT        Results from last 7 days  Lab Units 01/23/18  1257   TROPONIN I ng/mL 0 98*           Results from last 7 days  Lab Units 01/23/18  1229   INR  2 36*               I have personally reviewed the EKG, CXR and Telemetry images directly  Patient Active Problem List    Diagnosis Date Noted    Hypotension 01/23/2018    Transaminitis 01/23/2018    Lactic acidosis 01/23/2018    NSTEMI (non-ST elevated myocardial infarction) ([place] [address] ) 01/23/2018    Hyponatremia 01/08/2018    Anemia 01/08/2018    PAF (paroxysmal atrial fibrillation) ([place] [address] ) 01/08/2018    Pacemaker 01/08/2018    End stage renal disease ([place] [address] ) 01/08/2018    Acute kidney injury superimposed on chronic kidney disease ([place] [address] ) 12/27/2017    Syncope and collapse 10/07/2016    CKD (chronic kidney disease) stage 4, GFR 15-29 ml/min ([facility]     Hypertension     Severe aortic stenosis     Acute on chronic combined systolic and diastolic CHF (congestive heart failure) ([place] [address] )     Hypothyroidism     Pulmonary hypertension, moderate to severe     GERD (gastroesophageal reflux disease)        Portions of the record may have been created with voice recognition software   Occasional wrong word or "sound a like" substitutions may have occurred due to the inherent limitations of voice recognition software  Read the chart carefully and recognize, using context, where substitutions have occurred

## 2018-01-23 NOTE — H&P
History & Physical Exam - Critical Care   Demetrius Rust 80 y o  male MRN: [id number]  Unit/Bed#: ED 08 Encounter: [id number]      Assessment/Plan:  1  Acute metabolic encephalopathy  · Will admit to stepdown unit for closer monitoring, will require greater than 2 midnight hospitalization stay  · Mild confusion at times may be related to liver dysfunction, there is no clear source of infection at this time  · Check an ammonia level  · Hold any medications that would alter mental status  2  Hypotension  · New for patient today, given 1 liter of fluid in the emergency department with improvement of systolic blood pressure to the 90's  · Hold on blood pressure medications at home  · Does not appear to have sepsis will hold on sepsis protocol, no source of infection identified  · May need additional fluids  3  Transaminitis with lactic acidosis  · Check acute hepatitis panel  · CT abdomen/pelvis pending   · Check ammonia level  4  Coagulopathy due to liver dysfunction  · Is not on anticoagulation   · Monitor Inr but suspect from liver dysfunction  5  End stage renal disorder on hemodialysis   · Consult nephrology  · Will need dialysis whether CVVH vs hemodialysis  6  NSTEMI type 2  · Trend troponins  · Will consult cardiology due to NSTEMI with t wave inversions and severe aortic stenosis  7  Hypothyroidism  · Resume on synthroid  8  Severe aortic stenosis  9  Stage I sacral decubitus ulcer POA  10  Chronic combined systolic and diastolic congestive heart failure  · Caution on fluids due to history of heart failure  · May need small amount of fluid if remains hypotensiver     _____________________________________________________________________      HPI:    Demetrius Rust is a 80 y o  male who presents to the emergency department from the dialysis center due to hypotension and lethargy    He has a past medical history significant for severe aortic stenosis, pulmonary hypertension, systolic and diastolic congestive heart failure, and end stage renal disease on hemodialysis  He was hospitalized in December 2017 with an acute kidney injury that was suspected due to colchicine  After fluid resuscitation did not have improvement of renal function and required placement of a tunneled dialysis catheter and is now end stage dialysis  Since being discharged he has resided at [facility] and per the son has had a decreased mental status  Family has noticed increased confusion and lethargy since being discharged with a decreased appetite  The past week the family noticed a cough, occasionally nausea with vomiting and increased lower extremity edema  Today the patient was hypotensive at the [facility] prior to going to dialysis  He was on dialysis for approximately 45 minutes before they had to stop due to lethargy and hypotension  Due to this was transferred to the emergency department  When he arrived into the ER his systolic blood pressure was in the 80's  He has intermittent confusion which has been increasing since discharge  Was noted to have transaminitis and INR of 2 36 which is new  He has no history of liver dysfunction and now appears to have jaundice and petechiae  The patient is only complaining of a cough and has no other complaints at this time  Review of Systems:  Review of Systems   Constitutional: Positive for appetite change  HENT: Negative  Eyes: Negative  Respiratory: Positive for cough and shortness of breath  Cardiovascular: Positive for leg swelling  Gastrointestinal: Positive for vomiting  Negative for diarrhea  Endocrine: Negative  Musculoskeletal: Negative  Skin: Positive for color change  Allergic/Immunologic: Negative  Neurological: Positive for weakness  Psychiatric/Behavioral: Positive for confusion  All other systems reviewed and are negative  Full 14 point review of systems was performed   Aside from what was mentioned in the HPI, it is otherwise negative  Historical Information   Past Medical History:   Diagnosis Date    [facility] ) 12/27/2017    Aortic stenosis     severe    [facility]omy[facility] )     [facility]     CKD (chronic kidney disease) stage 3, GFR 30-59 ml/min     Combined systolic and diastolic heart failure (HCC)     GERD (gastroesophageal reflux disease)     Heart murmur     Hyperkalemia 12/27/2017    Hypertension     Hypothyroidism     hypothyroid    Pulmonary hypertension     Pulmonary hypertension, moderate to severe     Severe aortic stenosis     Sinus pause     Syncope      Past Surgical History:   Procedure Laterality Date    ATRIAL CARDIAC PACEMAKER INSERTION      BACK SURGERY      BACK SURGERY      disc surgery    LUNG BIOPSY      pneumothorax     Social History   History   Alcohol Use No     History   Drug Use No     History   Smoking Status    Former Smoker    Packs/day: 1 00    Years: 50 00   Smokeless Tobacco    Never Used     Comment: quit 10 years ago       Family History:   Family History   Problem Relation Age of Onset   [facility] Cancer Brother     Heart disease Mother        Medications:  Pertinent medications were reviewed    sodium chloride 1,000 mL Intravenous Once         No Known Allergies      Vitals:   BP (!) 96/48 (BP Location: Left arm)   Pulse 71   Temp 97 9 °F (36 6 °C) (Oral)   Resp (!) 28   Wt 64 kg (141 lb 1 5 oz)   SpO2 98%   BMI 23 48 kg/m²   Body mass index is 23 48 kg/m²    SpO2: 98 %,   SpO2 Activity: At Rest,   O2 Device: None (Room air)      Intake/Output Summary (Last 24 hours) at 01/23/18 1452  Last data filed at 01/23/18 1431   Gross per 24 hour   Intake              500 ml   Output                0 ml   Net              500 ml     Invasive Devices     Peripheral Intravenous Line            Peripheral IV 01/23/18 Right Antecubital less than 1 day          Line            HD Cath Double 19 days                Physical Exam:  Gen: Alert and oriented x3, NAD  HEENT: PERRL, EOMI, normocephalic, atraumatic, slightly icteric   Neck/lymph: Supple, no JVD, no adenopathy  Chest: Decreased breath sounds  Cor: RRR, +3/6 murmur  Abd: Soft, non-tender, non-distended, bowel sounds present  Ext: GATICA, +2 lower extremity edema  Neuro: Follows commands, CN II-XII grossly intact  Skin: Warm, dry, stage I ulcer on bilateral buttocks POA, petechiae on bilateral lower extremities       Diagnostic Data:  Lab: I have personally reviewed pertinent lab results  ,   CBC:    Results from last 7 days  Lab Units 01/23/18  1230   WBC Thousand/uL 12 39*   HEMOGLOBIN g/dL 12 7   HEMATOCRIT % 40 5   PLATELETS Thousands/uL 108*      CMP: Lab Results   Component Value Date     01/23/2018    K 5 5 (H) 01/23/2018    CL 95 (L) 01/23/2018    CO2 26 01/23/2018    ANIONGAP 17 (H) 01/23/2018    BUN 51 (H) 01/23/2018    CREATININE 6 26 (H) 01/23/2018    GLUCOSE 113 01/23/2018    CALCIUM 8 0 (L) 01/23/2018     (H) 01/23/2018     (H) 01/23/2018    ALKPHOS 207 (H) 01/23/2018    PROT 6 5 01/23/2018    ALBUMIN 2 6 (L) 01/23/2018    BILITOT 3 21 (H) 01/23/2018    EGFR 7 01/23/2018   ,   PT/INR:   Lab Results   Component Value Date    INR 2 36 (H) 01/23/2018   ,   Troponin:   Lab Results   Component Value Date    TROPONINI 0 98 (H) 01/23/2018   ,   Magnesium: No results found for: MAG,  Phosphorous: No results found for: PHOS    ABG: No results found for: PHART, ZBK9DRF, PO2ART, XPZ2EKK, T8EWZJVG, BEART, SOURCE,     Microbiology:  Blood cultures pending    Imaging: I have personally reviewed the pertinent imaging studies on the PACS system  Chest x-ray: No acute pulmonary disease, cardiomegaly  CT head: no acute intracranial hemorrhage, 6 7 x 5 4 cm incidental right frontal arachnoid cyst    EKG/telemetry/Echo:    EKG: NSR with T wave inversions in II, III, AVF, V5-V6      VTE Prophylaxis: Heparin    Code Status: Prior    Portions of the record may have been created with voice recognition software  Occasional wrong word or "sound a like" substitutions may have occurred due to the inherent limitations of voice recognition software  Read the chart carefully and recognize, using context, where substitutions have occurred

## 2018-01-23 NOTE — CONSULTS
Consultation - Nephrology   Vickie Jung 80 y o  male MRN: [id number]  Unit/Bed#: ICU 04 Encounter: [id number]      Assessment/Plan:  1  End-stage renal disease, maintenance hemodialysis, Tuesday Thursday Saturday, recently started at [facility]  2  Hypotension,? Sepsis  3  Cholelithiasis, ultrasound pending  4  Lactic acidosis, likely secondary to hypotension  5  Encephalopathy, likely related to ongoing hypotension, possible sepsis  6  Transaminitis/coagulopathy  7  Non ST elevation MI  8  History of severe aortic stenosis, cardiomyopathy 75%, diastolic dysfunction  9  Sacral decubitus ulcer    Plan:  · Gentle IV fluids, blood pressure appears slightly improved, may need additional pressors if remains hypotension  · Slight hyperkalemia although sample hemolyzed  · Volume status appears stable  · Transition to CVVH if electrolytes or acidosis worsens  · Empiric antibiotics, blood cultures pending    History of Present Illness   Physician Requesting Consult: Kelli Wright MD  Reason for Consult / Principal Problem:  End-stage renal disease  HPI: Vickie Jung is a 80y o  year old male who presents with hypotension  Patient is an 80-year-old male who presents after 45 minutes of dialysis with profound hypotension with blood pressures in the 70s to 80s  According to the son patient has been increasingly confused, not eating with occasional nausea  At dialysis today again he was found to be markedly hypotensive approximately 45 minutes into had dialysis  Blood pressure did not respond IV fluids  Currently patient is confused, does not appear short of breath  Complains of being cold  No abdominal pain  Although patient poor historian, review of systems unreliable    History obtained from child, chart review and unobtainable from patient due to mental status    Review of Systems: pertinent findings as noted above otherwise negative    Historical Information   Patient Active Problem List Diagnosis    CKD (chronic kidney disease) stage 4, GFR 15-29 ml/min ([facility]    Hypertension    Severe aortic stenosis    Acute on chronic combined systolic and diastolic CHF (congestive heart failure) ([facility])    Hypothyroidism    Pulmonary hypertension, moderate to severe    Syncope and collapse    GERD (gastroesophageal reflux disease)    Acute kidney injury superimposed on chronic kidney disease ([facility]    Hyponatremia    Anemia    PAF (paroxysmal atrial fibrillation) ([facility])    Pacemaker    End stage renal disease ([facility])    Hypotension    Transaminitis    Lactic acidosis    NSTEMI (non-ST elevated myocardial infarction) ([facility] )     Past Medical History:   Diagnosis Date    KEO (acute kidney injury) ([facility] ) 12/27/2017    Aortic stenosis     severe    Cardiomyopathy ([facility] )     CHF (congestive heart failure) ([facility])     CKD (chronic kidney disease) stage 3, GFR 30-59 ml/min     Combined systolic and diastolic heart failure ([facility])     GERD (gastroesophageal reflux disease)     Heart murmur     Hyperkalemia 12/27/2017    Hypertension     Hypothyroidism     hypothyroid    Pulmonary hypertension     Pulmonary hypertension, moderate to severe     Severe aortic stenosis     Sinus pause     Syncope      Past Surgical History:   Procedure Laterality Date    ATRIAL CARDIAC PACEMAKER INSERTION      BACK SURGERY      BACK SURGERY      disc surgery    LUNG BIOPSY      pneumothorax     Social History   History   Alcohol Use No     History   Drug Use No     History   Smoking Status    Former Smoker    Packs/day: 1 00    Years: 50 00   Smokeless Tobacco    Never Used     Comment: quit 10 years ago     Family History   Problem Relation Age of Onset    Cancer Brother     Heart disease Mother        Meds/Allergies   current meds:   Current Facility-Administered Medications   Medication Dose Route Frequency    calcium gluconate 1 g in sodium chloride 0 9 % 50 mL IVPB  1 g Intravenous Once    cefepime (MAXIPIME) IVPB (premix) 1,000 mg  1,000 mg Intravenous Q24H    heparin (porcine) subcutaneous injection 5,000 Units  5,000 Units Subcutaneous Q8H [facility]    [START ON 1/24/2018] levothyroxine tablet 50 mcg  50 mcg Oral Early Morning    [START ON 1/24/2018] pantoprazole (PROTONIX) EC tablet 40 mg  40 mg Oral Daily Before Breakfast    sodium chloride 0 9 % bolus 1,000 mL  1,000 mL Intravenous Once    Followed by    sodium chloride 0 9 % bolus 1,000 mL  1,000 mL Intravenous Once    vancomycin (VANCOCIN) IVPB (premix) 1,000 mg  15 mg/kg Intravenous Once       No Known Allergies      Objective   BP (!) 86/54   Pulse 74   Temp 97 9 °F (36 6 °C) (Oral)   Resp 21   Ht 5' 10" (1 778 m)   Wt 67 5 kg (148 lb 13 oz)   SpO2 95%   BMI 21 35 kg/m²     Intake/Output Summary (Last 24 hours) at 01/23/18 1730  Last data filed at 01/23/18 1431   Gross per 24 hour   Intake              500 ml   Output                0 ml   Net              500 ml       Current Weight: Weight - Scale: 67 5 kg (148 lb 13 oz)    PHYSICAL EXAM:  General:  Chronically ill-appearing, no acute distress  HENT:  Mucosa dry  Eyes:   Without icterus  Neck:  Supple  Chest:  Clear to auscultation  CVS:  Regular, systolic ejection murmur  Abdomen:  Soft, noted guarding, no rigidity  Extremities:  Cool bilaterally, +1 edema  Skin:  No rash  Neuro:  Appears nonfocal  Psych:  Appropriate      Lab Results:      Results from last 7 days  Lab Units 01/23/18  1230   WBC Thousand/uL 12 39*   HEMOGLOBIN g/dL 12 7   HEMATOCRIT % 40 5   PLATELETS Thousands/uL 108*       Results from last 7 days  Lab Units 01/23/18  1229   SODIUM mmol/L 138   POTASSIUM mmol/L 5 5*   CHLORIDE mmol/L 95*   CO2 mmol/L 26   BUN mg/dL 51*   CREATININE mg/dL 6 26*   GLUCOSE RANDOM mg/dL 113   CALCIUM mg/dL 8 0*

## 2018-01-23 NOTE — PROCEDURES
Procedures by Ray Hernandez MD  at 12/28/2017  4:18 PM      Author:  Ray Hernandez MD Service:  Interventional Radiology  Author Type:  Physician    Filed:  12/28/2017  4:20 PM Date of Service:  12/28/2017  4:18 PM Status:  Signed    :  Ray Hernandez MD (Physician)        Procedure Orders:       1  CENTRAL LINE [[id number]] ordered by Ray Hernandez MD at 12/28/17 1618                  Central Line  Insertion  Date/Time: 12/28/2017 2:20 PM  Performed by: "[employer] Tanika  Authorized by: [employer]     Patient location:  IR  Consent:     Consent obtained:  Written    Consent given by:  Patient    Risks discussed:  Bleeding and infection    Alternatives discussed:  No treatment  Universal protocol:     Procedure explained and questions answered to patient or proxy's satisfaction: yes      Relevant documents present and verified: yes      Test results available and properly labeled: yes       Imaging studies available: yes      Required blood products, implants, devices, and special equipment available: yes      Site/side marked: yes      Immediately prior to procedure, a time out was called: yes      Patient identity confirmed:  Verbally with patient and arm band  Pre-procedure details:     Hand hygiene: Hand hygiene performed prior to insertion      Sterile barrier technique: All elements of maximal sterile technique followed      Skin preparation:  ChloraPrep    Skin preparation agent: Skin preparation agent completely dried prior to procedure    Indications:     Central line indications: dialysis    Anesthesia (see MAR for exact dosages):      Anesthesia method:  Local infiltration    Local anesthetic:  Lidocaine 1% w/o epi  Procedure details:     Location:  Right internal jugular    Vessel type: vein      Laterality:  Right    Approach: percutaneous technique used      Patient position:  Flat    Catheter type:  Double lumen    Catheter size:  14 Fr    Ultrasound guidance: yes      Sterile ultrasound techniques: Sterile gel and sterile probe covers were used      Number of attempts:  1    Successful placement: yes      Vessel of catheter tip end:  RA/SVC  Post-procedure details:     Post-procedure:  Line sutured and dressing applied    Assessment:  Blood return through all ports, free fluid flow and placement verified by x-ray    Patient tolerance of procedure:   Tolerated well, no immediate complications                 Received for:Dave Watts MD  Dec 28 2017  4:20PM [facility] Standard Time

## 2018-01-23 NOTE — ED PROVIDER NOTES
History  Chief Complaint   Patient presents with    Hypotension     Pt was at dialysis today  Dialysis was started and the patient became hypotensive, lethargic and confused  Dialysis was stopped and ems were called  79 yo male with newly diagnosed ESRD on HD for only about 1 month at this time by tunneled catheter in the R SC, coming from hemodialysis, which he gets T/Th/Sat, for acute hypotension after 45 mins on dialysis, along with confusion  Patient is currently in Skilled rehab, and according to his son who has been getting him to and from HD says he has been deteriorating mentally since getting to rehab, not eating/drinking, and having increasing confusion  Today when he picked him he was told the BP had been low and to make sure they knew that in dialysis  Apparently, it became even lower  Patient arrives listless, protecting airway, follows commands and will speak with me but is disoriented to place time and events  He appears jaundiced        History limited by:  Mental status change and unstable vital signs      Prior to Admission Medications   Prescriptions Last Dose Informant Patient Reported?  Taking?   acetaminophen (TYLENOL) 325 mg tablet   No No   Sig: Take 2 tablets by mouth every 6 (six) hours as needed for mild pain   aspirin (ECOTRIN LOW STRENGTH) 81 mg EC tablet   Yes No   Sig: Take 81 mg by mouth daily   calcitriol (ROCALTROL) 0 25 mcg capsule   Yes Yes   Sig: Take 0 25 mcg by mouth daily   carvedilol (COREG) 3 125 mg tablet   Yes Yes   Sig: Take 3 125 mg by mouth 2 (two) times a day with meals   docusate sodium (COLACE) 100 mg capsule   No No   Sig: Take 1 capsule by mouth 2 (two) times a day   furosemide (LASIX) 20 mg tablet   Yes No   Sig: Take 20 mg by mouth 2 (two) times a day   iron polysaccharides (NIFEREX) 150 mg capsule   Yes Yes   Sig: Take 150 mg by mouth 2 (two) times a day   iron sucrose 100 mg in sodium chloride 0 9 % 100 mL IVPB   No No   Sig: Infuse 100 mg into a venous catheter 3 (three) times a week for 7 doses   levothyroxine 50 mcg tablet   Yes No   Sig: Take 50 mcg by mouth daily   pantoprazole (PROTONIX) 40 mg tablet   No No   Sig: Take 1 tablet by mouth daily      Facility-Administered Medications: None       Past Medical History:   Diagnosis Date    KEO [facility] kidney [facility]) ([email or web address] ) 12/27/2017    Aortic stenosis     severe    Cardiomyopathy ([email or web address] )     [facility]     CKD (chronic kidney disease) stage 3, GFR 30-59 ml/min     Combined systolic and diastolic heart failure ([facility])     GERD (gastroesophageal reflux disease)     Heart murmur     Hyperkalemia 12/27/2017    Hypertension     Hypothyroidism     hypothyroid    Pulmonary hypertension     Pulmonary hypertension, moderate to severe     Severe aortic stenosis     Sinus pause     Syncope        Past Surgical History:   Procedure Laterality Date    ATRIAL CARDIAC PACEMAKER INSERTION      BACK SURGERY      BACK SURGERY      disc surgery    LUNG BIOPSY      pneumothorax       Family History   Problem Relation Age of Onset    Cancer Brother     Heart disease Mother      I have reviewed and agree with the history as documented      Social History   Substance Use Topics    Smoking status: Former Smoker     Packs/day: 1 00     Years: 50 00    Smokeless tobacco: Never Used      Comment: quit 10 years ago    Alcohol use No        Review of Systems   Unable to perform ROS: Mental status change       Physical Exam  ED Triage Vitals [01/23/18 1210]   Temperature Pulse Respirations Blood Pressure SpO2   97 9 °F (36 6 °C) 78 (!) 32 (!) 83/47 100 %      Temp Source Heart Rate Source Patient Position - Orthostatic VS BP Location FiO2 (%)   Oral Monitor Lying Left arm --      Pain Score       No Pain           Orthostatic Vital Signs  Vitals:    01/23/18 1210 01/23/18 1324 01/23/18 1433 01/23/18 1557   BP: (!) 83/47 (!) 83/45 (!) 96/48 (!) 86/45   Pulse: 78 71 71 70   Patient Position - Orthostatic VS: Lying Lying Sitting Sitting       Physical Exam   Constitutional: He appears listless  He has a sickly appearance  Frail appearing,    HENT:   Head: Atraumatic  Mouth/Throat: Mucous membranes are dry  Eyes: Pupils are equal, round, and reactive to light  Cardiovascular: Normal rate  An irregular rhythm present  Exam reveals distant heart sounds and decreased pulses  Pulses:       Dorsalis pedis pulses are 1+ on the right side, and 1+ on the left side  Posterior tibial pulses are 1+ on the right side, and 1+ on the left side  Pulmonary/Chest: Tachypnea noted  He is in respiratory distress (mild)  He has decreased breath sounds  Abdominal: Soft  He exhibits no distension  There is no tenderness  Musculoskeletal: He exhibits edema  Neurological: He appears listless  GCS eye subscore is 4  GCS verbal subscore is 4  GCS motor subscore is 6  Skin: Skin is dry  Capillary refill takes 2 to 3 seconds  Petechiae (possibly BLE) noted  He is not diaphoretic  There is pallor  Icteric appearing   Nursing note and vitals reviewed  ED Medications  Medications   sodium chloride 0 9 % bolus 500 mL (0 mL Intravenous Stopped 1/23/18 1431)   sodium chloride 0 9 % bolus 1,000 mL (1,000 mL Intravenous New Bag 1/23/18 1435)       Diagnostic Studies  Results Reviewed     Procedure Component Value Units Date/Time    Lactic acid x2 Q2H [[id number]] Collected:  01/23/18 1559    Lab Status: In process Specimen:  Blood from Arm, Right Updated:  01/23/18 1604    Blood culture [[id number]] Collected:  01/23/18 1558    Lab Status: In process Specimen:  Blood from Arm, Left Updated:  01/23/18 1604    Hepatitis panel, acute [[id number]] Collected:  01/23/18 1558    Lab Status: In process Specimen:  Blood from Arm, Left Updated:  01/23/18 1604    Calcium, ionized [[id number]] Collected:  01/23/18 1558    Lab Status:   In process Specimen:  Blood from Arm, Left Updated:  01/23/18 1604    Blood culture [[id number]] Collected:  01/23/18 1532    Lab Status: In process Specimen:  Blood from Arm, Right Updated:  01/23/18 1537    Magnesium [[id number]]  (Normal) Collected:  01/23/18 1229    Lab Status:  Final result Specimen:  Blood from Arm, Right Updated:  01/23/18 1524     Magnesium 2 4 mg/dL     Lactic acid x2 Q2H [[id number]]  (Abnormal) Collected:  01/23/18 1410    Lab Status:  Final result Specimen:  Blood from Arm, Right Updated:  01/23/18 1440     LACTIC ACID 5 7 (HH) mmol/L     Narrative:         Result may be elevated if tourniquet was used during collection  Troponin I [[id number]]  (Abnormal) Collected:  01/23/18 1257    Lab Status:  Final result Specimen:  Blood from Arm, Right Updated:  01/23/18 1324     Troponin I 0 98 (H) ng/mL     Narrative:         [employer] Chemistry analyzer 99% cutoff is > 0 04 ng/mL in network labs    o cTnI 99% cutoff is useful only when applied to patients in the clinical setting of myocardial ischemia  o cTnI 99% cutoff should be interpreted in the context of clinical history, ECG findings and possibly cardiac imaging to establish correct diagnosis  o cTnI 99% cutoff may be suggestive but clearly not indicative of a coronary event without the clinical setting of myocardial ischemia  Protime-INR [[id number]]  (Abnormal) Collected:  01/23/18 1229    Lab Status:  Final result Specimen:  Blood from Arm, Right Updated:  01/23/18 1256     Protime 26 1 (H) seconds      INR 2 36 (H)    APTT [[id number]]  (Abnormal) Collected:  01/23/18 1229    Lab Status:  Final result Specimen:  Blood from Arm, Right Updated:  01/23/18 1256     PTT 68 (H) seconds     Narrative:          Therapeutic Heparin Range = 60-90 seconds    Comprehensive metabolic panel [[id number]]  (Abnormal) Collected:  01/23/18 1229    Lab Status:  Final result Specimen:  Blood from Arm, Right Updated:  01/23/18 1251     Sodium 138 mmol/L      Potassium 5 5 (H) mmol/L      Chloride 95 (L) mmol/L      CO2 26 mmol/L      Anion Gap 17 (H) mmol/L      BUN 51 (H) mg/dL      Creatinine 6 26 (H) mg/dL      Glucose 113 mg/dL      Calcium 8 0 (L) mg/dL       (H) U/L       (H) U/L      Alkaline Phosphatase 207 (H) U/L      Total Protein 6 5 g/dL      Albumin 2 6 (L) g/dL      Total Bilirubin 3 21 (H) mg/dL      eGFR 7 ml/min/1 73sq m     Narrative:         National Kidney Disease Education Program recommendations are as follows:  GFR calculation is accurate only with a steady state creatinine  Chronic Kidney disease less than 60 ml/min/1 73 sq  meters  Kidney failure less than 15 ml/min/1 73 sq  meters  POCT troponin [[id number]]  (Abnormal) Collected:  01/23/18 1233    Lab Status:  Final result Updated:  01/23/18 1246     POC Troponin I 0 79 (H) ng/ml      Specimen Type VENOUS    Narrative:         [employer] i-Stat handheld analyzer 99% cutoff is > 0 08ng/mL in network Emergency Departments    o cTnI 99% cutoff is useful only when applied to patients in the clinical setting of myocardial ischemia  o cTnI 99% cutoff should be interpreted in the context of clinical history, ECG findings and possibly cardiac imaging to establish correct diagnosis  o cTnI 99% cutoff may be suggestive but clearly not indicative of a coronary event without the clinical setting of myocardial ischemia      CBC and differential [[id number]]  (Abnormal) Collected:  01/23/18 1230    Lab Status:  Final result Specimen:  Blood from Arm, Right Updated:  01/23/18 1236     WBC 12 39 (H) Thousand/uL      RBC 4 25 Million/uL      Hemoglobin 12 7 g/dL      Hematocrit 40 5 %      MCV 95 fL      MCH 29 9 pg      MCHC 31 4 g/dL      RDW 18 1 (H) %      MPV 12 1 fL      Platelets 695 (L) Thousands/uL      nRBC 2 /100 WBCs      Neutrophils Relative 87 (H) %      Lymphocytes Relative 4 (L) %      Monocytes Relative 9 %      Eosinophils Relative 0 %      Basophils Relative 0 %      Neutrophils Absolute 10 84 (H) Thousands/µL      Lymphocytes Absolute 0 43 (L) Thousands/µL Monocytes Absolute 1 11 Thousand/µL      Eosinophils Absolute 0 00 Thousand/µL      Basophils Absolute 0 01 Thousands/µL                  XR chest 1 view portable   ED Interpretation by Keyshawn Knott MD (01/23 1345)   No acute changes from recent      Final Result by Drake Patiño DO (01/23 1441)      Volume loss of the left hemithorax  Perihilar opacity on the left is nonspecific           Workstation performed: [id number]         CT head without contrast   Final Result by Loulou Kirkland MD (01/23 1430)      No acute intracranial hemorrhage seen   No CT signs of acute infarction   6 7 x 5 4 cm incidental right frontal arachnoid cyst         Workstation performed: [id number]         CT abdomen pelvis wo contrast    (Results Pending)              Procedures  ECG 12 Lead Documentation  Date/Time: 1/23/2018 1:39 PM  Performed by: Umair Alegria  Authorized by: Jordan RIVER     Rate:     ECG rate:  66  Rhythm:     Rhythm: sinus rhythm    ST segments:     ST segments:  Abnormal    Depression:  II, III and aVF    CriticalCare Time  Performed by: Umair Alegria  Authorized by: Umair Alegria     Critical care provider statement:     Critical care time (minutes):  45    Critical care time was exclusive of:  Separately billable procedures and treating other patients and teaching time    Critical care was necessary to treat or prevent imminent or life-threatening deterioration of the following conditions:  Hepatic failure, dehydration and metabolic crisis    Critical care was time spent personally by me on the following activities:  Blood draw for specimens, obtaining history from patient or surrogate, development of treatment plan with patient or surrogate, discussions with consultants, evaluation of patient's response to treatment, examination of patient, interpretation of cardiac output measurements, ordering and performing treatments and interventions, ordering and review of laboratory studies, ordering and review of radiographic studies, re-evaluation of patient's condition and review of old charts    I assumed direction of critical care for this patient from another provider in my specialty: no             Phone Contacts  ED Phone Contact    ED Course  ED Course as of Jan 23 1612   Tue Jan 23, 2018   1317 [facility] on 12/[facility], he appears jaundiced, and INR is elevated which is concerning for hepatic failure, in the setting of renal failure    1318 I discussed end of life decisions, and family indicates he had not made any prior decisions, and they are under the impression he would opt for full resuscitative measures   1357 BP has not improved with a first N 250 mL bolus, will repeat, add lactate level, and admit to ICU    1407 Patient was noted to be essentially anuric as of 12/29    1416 D/W Dr Dominick Nick who agrees admission to ICU in lieu of additional resuscitation with IVF and to add CTAP as a source of the liver failure and/or intrabdominal infection  [facility] patient in ED and he is now off the floor aftter CT so additional orders deferred to critical care                          Initial Sepsis Screening     Row Name 01/23/18 1606                Is the patient's history suggestive of a new or worsening infection? (!)  Yes (Proceed)  -RM        Suspected source of infection suspect infection, source unknown  -RM        Are two or more of the following signs & symptoms of infection both present and new to the patient?         Indicate SIRS criteria Tachypnea > 20 resp per min; Altered mental status  -RM        If the answer is yes to both questions, suspicion of sepsis is present          If severe sepsis is present AND tissue hypoperfusion perists in the hour after fluid resuscitation or lactate > 4, the patient meets criteria for SEPTIC SHOCK          Are any of the following organ dysfunction criteria present within 6 hours of suspected infection and SIRS criteria that are NOT considered to be chronic conditions? (!)  Yes  -RM        Organ dysfunction Bilirubin > 2 0 mg/dL;INR > 1 5 or aPTT > 60 secs; Lactate > 2 0 mmol/L  -RM        Date of presentation of severe sepsis 01/23/18  -RM        Time of presentation of severe sepsis 1607  -RM        Tissue hypoperfusion persists in the hour after crystalloid fluid administration, evidenced, by either:          Was hypotension present within one hour of the conclusion of crystalloid fluid administration?         Date of presentation of septic shock          Time of presentation of septic shock            User Key  (r) = Recorded By, (t) = Taken By, (c) = Cosigned By    [address] Name Provider Type    [address] Haro, MD Physician                  [facility]  CritCare Time    Disposition  Final diagnoses:   Acute liver failure   End stage renal disease ([place] [address] )   Acute encephalopathy     Time reflects when diagnosis was documented in both MDM as applicable and the Disposition within this note     Time User Action Codes Description Comment    1/23/2018  2:00 PM Saud Don Add [K72 00] Acute liver failure     1/23/2018  2:00 PM Marcella RIVER Add [N18 6] End stage renal disease ([place] [address] )     1/23/2018  2:00 PM Villavicencio Don Add [G93 40] Acute encephalopathy     1/23/2018  2:47 PM Bilofsky, Elisa Car Add [I21 4] NSTEMI (non-ST elevated myocardial infarction) ([place] [address] )     1/23/2018  2:47 PM Bilofsky, Elisa Car Modify [I21 4] NSTEMI (non-ST elevated myocardial infarction) ([place] [address] )     1/23/2018  2:47 PM Bilofsky, Elisa Car Add [I35 0] Aortic stenosis       ED Disposition     ED Disposition Condition Comment    Admit  Case was discussed with Dr Bernice Villafana and the patient's admission status was agreed to be Admission Status: inpatient status to the service of Dr Bernice Villafana           Follow-up Information    None       Patient's Medications   Discharge Prescriptions    No medications on file     No discharge procedures on file      ED Provider  Electronically Signed by           Keyshawn Knott MD  01/23/18 1020

## 2018-01-24 NOTE — PROGRESS NOTES
Progress Note - Critical Care   Macy Snow 80 y o  male MRN: [id number]  Unit/Bed#: ICU 04 Encounter: [id number]    Assessment/Plan:  1  Cardiogenic shock likely related to cardiomyopathy secondary to his severe aortic stenosis  · Appreciate cardiology's assistance with the care of this patient  · He remains hypotensive, we will begin peripheral dobutamine  · Overall his prognosis is poor  2  Acute metabolic encephalopathy likely multifactorial related to hypotension, 1   · We will continue to monitor his mental status  3  Transaminitis with lactic acidosis likely secondary to 1   · Right upper quadrant ultrasound is pending  4  End-stage renal disease on hemodialysis  · Will defer decision making regarding CVVH to Nephrology is recommendations  5  Non STEMI type 2  6  Severe aortic stenosis  7  Chronic combined systolic and diastolic congestive heart failure  8  Disposition:  Overall the patient's prognosis is poor given his constellation of comorbidities including severe aortic stenosis, cardiomyopathy, cardiogenic shock, and end-stage renal disease  Dr Hernández Later had a long conversation with the patient's son and wife regarding his poor prognosis from a cardiac standpoint  He is currently a DNR/DNI  We will consider a palliative care consult to assist in goals of care moving forward  Critical Care Time:   Documented critical care time excludes any procedures documented elsewhere  It also excludes any family updates    _____________________________________________________________________    HPI/24hr events:   Events of admission are noted  The patient remains hypotensive despite gentle fluid hydration  Medications:    cefepime 1,000 mg Intravenous Q24H   heparin (porcine) 5,000 Units Subcutaneous Q8H [address]   levothyroxine 50 mcg Oral Early Morning   pantoprazole 40 mg Oral Daily Before Breakfast              Physical exam:  Vitals: Body mass index is 21 35 kg/m²    Blood pressure (!) 82/45, pulse 76, temperature 98 9 °F (37 2 °C), temperature source Temporal, resp  rate (!) 26, height 5' 10" (1 778 m), weight 67 5 kg (148 lb 13 oz), SpO2 97 %  ,  Temp  Min: 97 4 °F (36 3 °C)  Max: 98 9 °F (37 2 °C)  IBW: 73 kg    SpO2: 97 %  SpO2 Activity: At Rest  O2 Device: None (Room air)      Intake/Output Summary (Last 24 hours) at 01/24/18 0515  Last data filed at 01/23/18 1901   Gross per 24 hour   Intake              800 ml   Output                0 ml   Net              800 ml       Invasive/non-invasive ventilation settings:   Respiratory    Lab Data (Last 4 hours)    None         O2/Vent Data (Last 4 hours)    None              Invasive Devices     Peripheral Intravenous Line            Peripheral IV 01/23/18 Left Antecubital less than 1 day    Peripheral IV 01/23/18 Right Antecubital less than 1 day          Line            HD Cath Double 19 days                  Physical Exam:  Gen:  Confused but arousable  HEENT:  Pupils are equal round and reactive to light  Neck:  Supple  Chest:  Coarse with rhonchi throughout both lung fields  Cor:  Regular rate and rhythm  Abd:  Mildly distended but soft  Ext:  2+ pitting edema in his bilateral lower extremities  Neuro:  Confused but arousable, able to move his extremities  Skin:  Cool, dry, sacral decubitus ulcer stage I was present on admission      Diagnostic Data:  Lab: I have personally reviewed pertinent lab results     CBC:     Results from last 7 days  Lab Units 01/23/18  1749 01/23/18  1230   WBC Thousand/uL  --  12 39*   HEMOGLOBIN g/dL  --  12 7   HEMATOCRIT %  --  40 5   PLATELETS Thousands/uL 85* 108*       CMP:     Results from last 7 days  Lab Units 01/23/18  1229   SODIUM mmol/L 138   POTASSIUM mmol/L 5 5*   CHLORIDE mmol/L 95*   CO2 mmol/L 26   BUN mg/dL 51*   CREATININE mg/dL 6 26*   CALCIUM mg/dL 8 0*   TOTAL PROTEIN g/dL 6 5   BILIRUBIN TOTAL mg/dL 3 21*   ALK PHOS U/L 207*   ALT U/L 292*   AST U/L 502*   GLUCOSE RANDOM mg/dL 113     PT/INR:   Lab Results Component Value Date    INR 2 36 (H) 01/23/2018   ,   Magnesium:   Results from last 7 days  Lab Units 01/23/18  1229   MAGNESIUM mg/dL 2 4     Phosphorous:       Microbiology:        Imaging:      Cardiac lab/EKG/telemetry/ECHO:       VTE Prophylaxis:  Heparin    Code Status: Level 3 - DNAR and DNI    Thurlow Cockayne, CRNP    Portions of the record may have been created with voice recognition software  Occasional wrong word or "sound a like" substitutions may have occurred due to the inherent limitations of voice recognition software  Read the chart carefully and recognize, using context, where substitutions have occurred

## 2018-01-24 NOTE — PROGRESS NOTES
Progress Note - Cardiology   Susan Jackson 80 y o  male MRN: [id number]  Unit/Bed#: ICU 04 Encounter: [id number]      Assessment:     1  Probable cardiogenic shock secondary to cardiomyopathy/severe aortic stenosis with moderate aortic regurgitation  2  End-stage renal disease now on dialysis  3  Metabolic encephalopathy    Discussion/Recommendations:    Decreased platelets noted ? DIC  Can increase dobutamine to improve cardiac output but overall prognosis will remain poor  Subjective:  Unable to offer complaints this morning      Physical Exam:  GEN:  NAD  HEENT:  MMM, NCAT, pink conjunctiva, EOMI, nonicteric sclera  CV:  Probable increased JVD, RR, NO M/R/G, +S1/S2, NO PARASTERNAL HEAVE/THRILL, NO LE EDEMA, NO HEPATIC SYSTOLIC PULSATION, WARM EXTREMITIES  RESP:  Diffuse rhonchi bilateral  ABD:  SOFT, NT, NO GROSS ORGANOMEGALY      Vitals:   BP (!) 86/51   Pulse 80   Temp 97 7 °F (36 5 °C) (Temporal)   Resp (!) 23   Ht 5' 10" (1 778 m)   Wt 69 5 kg (153 lb 3 5 oz)   SpO2 96%   BMI 21 98 kg/m²   Vitals:    01/23/18 1621 01/24/18 0552   Weight: 67 5 kg (148 lb 13 oz) 69 5 kg (153 lb 3 5 oz)       Intake/Output Summary (Last 24 hours) at 01/24/18 0848  Last data filed at 01/23/18 1901   Gross per 24 hour   Intake              800 ml   Output                0 ml   Net              800 ml         TELEMETRY:  No significant events  Lab Results:    Results from last 7 days  Lab Units 01/24/18  0536   WBC Thousand/uL 15 49*   HEMOGLOBIN g/dL 13 2   HEMATOCRIT % 43 5   PLATELETS Thousands/uL 89*       Results from last 7 days  Lab Units 01/24/18  0536 01/23/18  1229   SODIUM mmol/L 139 138   POTASSIUM mmol/L 5 7* 5 5*   CHLORIDE mmol/L 97* 95*   CO2 mmol/L 18* 26   BUN mg/dL 63* 51*   CREATININE mg/dL 6 97* 6 26*   CALCIUM mg/dL 8 0* 8 0*   TOTAL PROTEIN g/dL  --  6 5   BILIRUBIN TOTAL mg/dL  --  3 21*   ALK PHOS U/L  --  207*   ALT U/L  --  292*   AST U/L  --  502*   GLUCOSE RANDOM mg/dL 81 113 Results from last 7 days  Lab Units 01/24/18  0536   SODIUM mmol/L 139   POTASSIUM mmol/L 5 7*   CHLORIDE mmol/L 97*   CO2 mmol/L 18*   BUN mg/dL 63*   CREATININE mg/dL 6 97*   GLUCOSE RANDOM mg/dL 81   CALCIUM mg/dL 8 0*             Medications:    Current Facility-Administered Medications:     cefepime (MAXIPIME) IVPB (premix) 1,000 mg, 1,000 mg, Intravenous, Q24H, ROCK Aparicio, Stopped at 01/23/18 1848    DOBUTamine (DOBUTREX) 500 mg in 250 mL infusion (premix), 2 5 mcg/kg/min, Intravenous, Continuous, Kiana ROCK Gill, Last Rate: 5 06 mL/hr at 01/24/18 0609, 2 5 mcg/kg/min at 01/24/18 0609    heparin (porcine) subcutaneous injection 5,000 Units, 5,000 Units, Subcutaneous, Q8H [address], 5,000 Units at 01/24/18 0615 **AND** Platelet count, , , Once, Kristin K Bilofsky, CRNP    levothyroxine tablet 50 mcg, 50 mcg, Oral, Early Morning, Namon Breath Bilofsky, CRNP, 50 mcg at 01/24/18 0614    pantoprazole (PROTONIX) EC tablet 40 mg, 40 mg, Oral, Daily Before Breakfast, Namon Breath Bilofsky, CRNP, 40 mg at 01/24/18 0121    Portions of the record may have been created with voice recognition software  Occasional wrong word or "sound a like" substitutions may have occurred due to the inherent limitations of voice recognition software  Read the chart carefully and recognize, using context, where substitutions have occurred

## 2018-01-24 NOTE — CASE MANAGEMENT
Initial Clinical Review    Admission: Date/Time/Statement: 1/23/18 @ 1427     Orders Placed This Encounter   Procedures    Inpatient Admission (expected length of stay for this patient is greater than two midnights)     Standing Status:   Standing     Number of Occurrences:   1     Order Specific Question:   Admitting Physician     Answer:   Jess Shaffer     Order Specific Question:   Level of Care     Answer:   Critical Care [15]     Order Specific Question:   Estimated length of stay     Answer:   More than 2 Midnights     Order Specific Question:   Certification     Answer:   I certify that inpatient services are medically necessary for this patient for a duration of greater than two midnights  See H&P and MD Progress Notes for additional information about the patient's course of treatment  ED: Date/Time/Mode of Arrival:   ED Arrival Information     Expected Arrival Acuity Means of Arrival Escorted By Service Admission Type    - 1/23/2018 12:07 Emergent Ambulance [facility] Critical Care/ICU Emergency    Arrival Complaint    Hypotention          Chief Complaint:   Chief Complaint   Patient presents with    Hypotension     Pt was at dialysis today  Dialysis was started and the patient became hypotensive, lethargic and confused  Dialysis was stopped and ems were called  History of Illness:    Jake Roblero is a 80 y o  male who presents to the emergency department from the dialysis center due to hypotension and lethargy  He has a past medical history significant for severe aortic stenosis, pulmonary hypertension, systolic and diastolic congestive heart failure, and end stage renal disease on hemodialysis  He was hospitalized in December 2017 with an acute kidney injury that was suspected due to colchicine  After fluid resuscitation did not have improvement of renal function and required placement of a tunneled dialysis catheter and is now end stage dialysis    Since being discharged he has resided at [facility] and per the son has had a decreased mental status  Family has noticed increased confusion and lethargy since being discharged with a decreased appetite  The past week the family noticed a cough, occasionally nausea with vomiting and increased lower extremity edema  Today the patient was hypotensive at the [facility] prior to going to dialysis  He was on dialysis for approximately 45 minutes before they had to stop due to lethargy and hypotension  Due to this was transferred to the emergency department      When he arrived into the ER his systolic blood pressure was in the 80's  He has intermittent confusion which has been increasing since discharge  Was noted to have transaminitis and INR of 2 36 which is new  He has no history of liver dysfunction and now appears to have jaundice and petechiae  The patient is only complaining of a cough and has no other complaints at this time  ED Vital Signs:   ED Triage Vitals [01/23/18 1210]   Temperature Pulse Respirations Blood Pressure SpO2   97 9 °F (36 6 °C) 78 (!) 32 (!) 83/47 100 %      Temp Source Heart Rate Source Patient Position - Orthostatic VS BP Location FiO2 (%)   Oral Monitor Lying Left arm --      Pain Score       No Pain        Wt Readings from Last 1 Encounters:   01/24/18 69 5 kg (153 lb 3 5 oz)       Vital Signs (abnormal):    above    Abnormal Labs/Diagnostic Test Results:   Lactic  Acid   6 3  Troponin  0 98  PT   26 1      INR   2 36      PTT    68  BUN/Creat    51/6 26  AG  17  K  5 5  ALT  292  Alk phos   207  Albumin   2 6  Total  Bili   3 12  WBC   12 39  Abs  neutro    10 84  Ct  Abd/pelvis:    Chronic lingular and left basilar lung changes  2  Moderate cardiomegaly  3  Suspect cholelithiasis with high density within the gallbladder, correlate with gallbladder ultrasound  4  Small amount of perihepatic and left paracolic gutter ascites  5  Colonic diverticulosis   Wall thickening of the proximal transverse colon and descending colon could relate to mild colitis rather than underdistention  6  Enlarged prostate with bladder wall irregularity which could relate to outlet obstruction or cystitis or underdistention  7  Bilateral renal cysts  8  Hepatic steatosis and hepatomegaly  Ct  Head:     No acute intracranial hemorrhage seen  No CT signs of acute infarction  6 7 x 5 4 cm incidental right frontal arachnoid cyst  CXR:      Volume loss of the left hemithorax  Perihilar opacity on the left is nonspecific  ED Treatment:   Medication Administration from 01/23/2018 1207 to 01/23/2018 1619       Date/Time Order Dose Route Action Action by Comments     01/23/2018 1431 sodium chloride 0 9 % bolus 500 mL 0 mL Intravenous Stopped Wilda Degroot RN      01/23/2018 1259 sodium chloride 0 9 % bolus 500 mL 500 mL Intravenous [address] Madelineanabel Vaughn RN      01/23/2018 1435 sodium chloride 0 9 % bolus 1,000 mL 1,000 mL Intravenous New Bag Yadira Le RN           Past Medical/Surgical History:    Active Ambulatory Problems     Diagnosis Date Noted    CKD (chronic kidney disease) stage 4, GFR 15-29 ml/min ([facility])     Hypertension     Severe aortic stenosis     Acute on chronic combined systolic and diastolic CHF (congestive heart failure) ([facility] )     Hypothyroidism     Pulmonary hypertension, moderate to severe     Syncope and collapse 10/07/2016    GERD (gastroesophageal reflux disease)     [facility] superimposed on chronic [facility] disease [facility] 12/27/2017    Hyponatremia 01/08/2018    Anemia 01/08/2018    PAF (paroxysmal atrial fibrillation) ([facility] ) 01/08/2018    Pacemaker 01/08/2018    End stage renal disease ([facility] ) 01/08/2018     Resolved Ambulatory Problems     Diagnosis Date Noted    Hyperkalemia 12/27/2017     Past Medical History:   Diagnosis Date    KEO (acute kidney injury) ([facility] ) 12/27/2017    Aortic stenosis     Cardiomyopathy ([facility] )     CHF (congestive heart failure) ([facility])     CKD (chronic kidney disease) stage 3, GFR 30-59 ml/min     Combined systolic and diastolic heart failure (HCC)     GERD (gastroesophageal reflux disease)     Heart murmur     Hyperkalemia 12/27/2017    Hypertension     Hypothyroidism     Pulmonary hypertension     Pulmonary hypertension, moderate to severe     Severe aortic stenosis     Sinus pause     Syncope        Admitting Diagnosis: End stage renal disease (HCC) [N18 6]  Aortic stenosis [I35 0]  Acute liver failure [K72 00]  NSTEMI (non-ST elevated myocardial infarction) ([facility] ) [I21 4]  Acute encephalopathy [G93 40]  Low blood pressure [I95 9]    Age/Sex: 80 y o  male    1  Assessment/Plan:   Acute metabolic encephalopathy  ? Will admit to stepdown unit for closer monitoring, will require greater than 2 midnight hospitalization stay  ? Mild confusion at times may be related to liver dysfunction, there is no clear source of infection at this time  ? Check an ammonia level  ? Hold any medications that would alter mental status  2  Hypotension  ? New for patient today, given 1 liter of fluid in the emergency department with improvement of systolic blood pressure to the 90's  ? Hold on blood pressure medications at home  ? Does not appear to have sepsis will hold on sepsis protocol, no source of infection identified  ? May need additional fluids  3  Transaminitis with lactic acidosis  ? Check acute hepatitis panel  ? CT abdomen/pelvis pending   ? Check ammonia level  4  Coagulopathy due to liver dysfunction  ? Is not on anticoagulation   ? Monitor Inr but suspect from liver dysfunction  5  End stage renal disorder on hemodialysis   · Consult nephrology  · Will need dialysis whether CVVH vs hemodialysis  6  NSTEMI type 2  ? Trend troponins  ? Will consult cardiology due to NSTEMI with t wave inversions and severe aortic stenosis  7  Hypothyroidism  Resume on synthroid  6  Severe aortic stenosis  7  Stage I sacral decubitus ulcer POA  8   Chronic combined systolic and diastolic congestive heart failure  ? Caution on fluids due to history of heart failure  ? May need small amount of fluid if remains hypotensiver     Admission Orders:    IP   1/23  @   1427  Scheduled Meds:   heparin (porcine) 5,000 Units Subcutaneous Q8H [facility]   levothyroxine 50 mcg Oral Early Morning   pantoprazole 40 mg Oral Daily Before Breakfast     Continuous Infusions:   DOBUTamine in dextrose 5% 2 5 mcg/kg/min Last Rate: 5 mcg/kg/min (01/24/18 1002)     PRN Meds:     Serial troponin  Neuro checks  Q 4 hrs  Cons cardiology  Cons nephrology  BC  Cons  GI  2 DE    PRIOR  ADMISSION:  Admission Date: 12/26/2017       Discharge Date:  01/08/2018     Primary Diagnoses  Principal Problem:    End stage renal disease [facility] )  Active Problems:    CKD (chronic kidney disease) stage 4, GFR 15-29 ml/min ([facility])    Hypertension    Severe aortic stenosis    Acute on chronic combined systolic and diastolic CHF (congestive heart failure) ([facility])    Hypothyroidism    GERD (gastroesophageal reflux disease)    Acute kidney injury superimposed on chronic kidney disease ([facility])    Hyponatremia    Anemia    PAF (paroxysmal atrial fibrillation) ([facility] )    Pacemaker  Resolved Problems:    Hyperkalemia        Service:  [facility], Dr Loy Melendrez and Crow Gordon Providers   Nephrology:  Dr Lucina Stern Toxicology: Dr Ck Mercedes     Procedures Performed:  12/28: temporary dialysis catheter placement  1/4: IR permacath placement      Hospital Studies:  1/1 KUB: Nonobstructive bowel gas pattern  1/1 CXR: No evidence of pneumothorax status post line placement    Stable small pleural effusions or pleural thickening  12/28: CXR: Congestive failure and small bilateral pleural effusions        Results from last 7 days  Lab Units 01/08/18  0442 01/04/18  0438 01/02/18  0456   WBC Thousand/uL 10 00 10 13 8 65   HEMOGLOBIN g/dL 11 3* 10 9* 10 5*   HEMATOCRIT % 36 3* 34 2* 32 9*   PLATELETS Thousands/uL 161 148* 132*       Results from last 7 days  Lab Units 01/08/18  0442 01/05/18  0450 01/04/18  0439   SODIUM mmol/L 135* 135* 133*   POTASSIUM mmol/L 4 3 4 1 4 9   CHLORIDE mmol/L 95* 96* 93*   CO2 mmol/L 25 27 24   BUN mg/dL 52* 40* 62*   CREATININE mg/dL 5 70* 4 31* 5 99*   GLUCOSE RANDOM mg/dL 105 110 101   CALCIUM mg/dL 8 7 8 1* 8 3         History and Physical Exam:  Please refer to the Admission H&P note     Hospital Course by Problem  1-end-stage renal disease:  Patient has a history of chronic kidney disease stage 4  He initially presented to the hospital and was diagnosed with acute kidney injury, secondary to ATN from cardiomyopathy  He was also noted to have chronic colchicine toxicity  He was followed by the nephrology service during his hospital stay, he was initiated on dialysis, however has been dialysis dependent since December 29, with poor urine output, and no significant evidence of renal recovery  The nephrology service concluded that given his advanced age, and underlying severe chronic kidney disease, he now likely has end-stage renal disease                -patient had a Perma-Cath placed  He was followed by case management who has established outpatient dialysis chair time      2-severe aortic stenosis:  Patient has a history of severe aortic stenosis  He follows with the cardiologist Dr Cheri Cheatham in the office  Patient's volume status will be monitored closely on dialysis  -continue outpatient follow-up     3-acute/chronic systolic and diastolic congestive heart failure exacerbation:  Patient's volume status is currently being controlled by dialysis  He is currently on Lasix 40 mg p o  B i d  -continue to monitor closely  -will decrease this back down to his home dose of Lasix 20 mg p o  b i d   Continue to monitor volume status      4-essential hypertension:  Patient had hypotension  His home carvedilol was held, with improvement in his hypotension   Continue to monitor closely      5-hypothyroidism:  Cont synthroid     6-Asthenia:  due to acute/chronic medical issues  PT/OT recommending STR  Pt will be dc today to STR at [facility]      7- Insomnia- continue restoril       8-poor po appetite: cont to encourage po intake      9-hyponatremia- stable  Cont to follow up as outpt      10- anemia:  Secondary to chronic kidney disease:  Continue venofer as needed  Hb stable at 11      11-history of paroxysmal atrial fibrillation:  Patient is noted to be in normal sinus rhythm  Continue outpatient follow-up with Dr Herminio Villalba  Beta-blocker currently on hold due to hypotension     12-prior history of pacemaker:  Continue routine pacemaker interrogation, and follow up with Cardiology  Patient does have a previous history of syncope secondary to sinus pauses       13-family:  Updated wife and son at bedside      Case d/w pt's nurse and

## 2018-01-24 NOTE — DISCHARGE SUMMARY
Discharge Summary - Vickie Jung 80 y o  male MRN: [id number]    Unit/Bed#: ICU 04 Encounter: [id number]    Admission Date: 1/23/2018     Admitting Diagnosis: End stage renal disease ([facility] ) [N18 6]  Aortic stenosis [I35 0]  Acute liver failure [K72 00]  NSTEMI (non-ST elevated myocardial infarction) ([facility] ) [I21 4]  Acute encephalopathy [G93 40]  Low blood pressure [I95 9]    HPI: 80year old male that presented to the emergency department from the dialysis center due to hypotension and lethargy  He has a past medical history significant for severe aortic stenosis, combined systolic and diastolic congestive heart failure, cardiomyopathy, and ESRD on hemodialysis  He was recently hospitalized in December 2017 with KEO due to colchicine but kidney function never returned and was initiated on dialysis  He was discharged to [facility] with a catheter and was receiving dialysis  Since discharge family has noted increased confusion and decreased appetite  Over the past week he began with increased lower extremity edema  Yesterday prior to dialysis he was noted to be hypotensive but went to dialysis  They performed 45 minutes of dialysis and had to stop due to hypotension and lethargy  He was transferred to the emergency department for evaluation  In the ER was noted to be hypotensive with a systolic blood pressure in the 80's and given a fluid bolus  Was noted to have new transaminitis and elevated INR  Was referred to stepdown unit due to the hypotension  Procedures Performed:   Orders Placed This Encounter   Procedures   [employer] ED ECG Documentation Only       Hospital Course: Patient was admitted to the stepdown unit for closer monitoring  A CT abdomen/pelvis was performed to determine cause of transaminitis  Was found to have cholelithiasis but no bile duct dilatation   A consult was placed to cardiology due to continued hypotension with known history of severe aortic stenosis and cardiomyopathy now appearing to be in shock  Patient was noted to be in cardiogenic shock that was not responding to IVF hydration  Discussions were had with the family and they decided that to make the patient a DNR/DNI with no aggressive intervention  In December the patient was able to make decisions for himself and did not want aggressive measures and did not want a TAVR or anticoagulation  Overnight was hypotensive and started on Dobutamine without improvement  Patient continued to worsen and was in need of dialysis  Nephrology spoke with family regarding need for CVVH or possible hospice care  Family would like to hold off on CVVH and consider hospice care  Hospice was consulted and was appropriate for inpatient hospice  Dobutamine has been discontinued and family is aware  Significant Findings, Care, Treatment and Services Provided:   Microbiology:    blood culture- pending    Imagin/23 CT abdomen/pelvis:   1  Chronic lingular and left basilar lung changes  2  Moderate cardiomegaly  3  Suspect cholelithiasis with high density within the gallbladder, correlate with gallbladder ultrasound  4  Small amount of perihepatic and left paracolic gutter ascites  5  Colonic diverticulosis  Wall thickening of the proximal transverse colon and descending colon could relate to mild colitis rather than underdistention  6  Enlarged prostate with bladder wall irregularity which could relate to outlet obstruction or cystitis or underdistention  7  Bilateral renal cysts  8  Hepatic steatosis and hepatomegaly       CT head:  No acute intracranial hemorrhage seen  No CT signs of acute infarction  6 7 x 5 4 cm incidental right frontal arachnoid cyst    Activity: Activity as tolerated by patient    Diet: As tolerated by patient    Complications: None    Discharge Diagnosis: Cardiogenic shock due to end stage heart failure    Condition at Discharge: poor     Discharge instructions/Information to patient and family:   See after visit summary for information provided to patient and family  Provisions for Follow-Up Care:  See after visit summary for information related to follow-up care and any pertinent home health orders  Disposition: [facility]    Planned Readmission: No    Discharge Statement   I spent 28 minutes discharging the patient  This time was spent on the day of discharge  I had direct contact with the patient on the day of discharge  Additional documentation is required if more than 30 minutes were spent on discharge  Discharge Medications:  See after visit summary for reconciled discharge medications provided to patient and family

## 2018-01-24 NOTE — HOSPICE NOTE
Discussed patients symptom burden and expected trajectory with CRNP and hospice physician and decided to present In patient hospice services to the family for discussion  Family had the hopes that hospice would provide more hours in the home and are concerned regarding possible symptom management issues with his respirations already started to become wet  After discussion the family is in agreement with transfer to [facility] for hospice care  Caroline Walters deferred decision making to her sons Mary Rodriguez and Monroe Borrero  Consents signed with son Monroe Borrero and  set up transport for 1700

## 2018-01-24 NOTE — PROGRESS NOTES
NEPHROLOGY PROGRESS NOTE   Michelle Osei 80 y o  male MRN: [id number]  Unit/Bed#: ICU 04 Encounter: [id number]  Reason for Consult:  End-stage renal disease    Assessment/Plan:  1  End-stage renal disease, maintenance hemodialysis, Tuesday Thursday Saturday, recently started at [facility]  2  Hyperkalemia  3  Hypotension/shock, likely cardiogenic  4  Cholelithiasis, ultrasound pending  5  Lactic acidosis, likely secondary to hypotension  6  Encephalopathy, likely related to ongoing hypotension,  7  Transaminitis/coagulopathy  8  Non ST elevation MI  9  History of severe aortic stenosis, cardiomyopathy 91%, diastolic dysfunction  10  Sacral decubitus ulcer    PLAN:  · Currently patient now on dobutamine drip, blood pressure still in the 80s  · Discussed with son at length in regards to goals of care  At this point time given his significant cardiac history advanced age and end-stage renal disease, overall prognosis poor  Agreeable to speak with hospice likely transition to comfort care measures  · Hold off on further measures including CVVH and/or hemodialysis  SUBJECTIVE:  Seen and examined  Patient awake, responsive  Currently on dobutamine drip    Does not appear short of breath, appears fairly comfortable    OBJECTIVE:  Current Weight: Weight - Scale: 69 5 kg (153 lb 3 5 oz)  Vitals:    01/24/18 0743 01/24/18 0800 01/24/18 0851 01/24/18 0900   BP: 92/62   (!) 86/48   Pulse:  86 88    Resp:  21 16    Temp:       TempSrc:       SpO2:  97% 99%    Weight:       Height:           Intake/Output Summary (Last 24 hours) at 01/24/18 1035  Last data filed at 01/24/18 0801   Gross per 24 hour   Intake           809 45 ml   Output                0 ml   Net           809 45 ml     GENERAL :  No acute distress  NECK:  Supple  CV:  Regular  RESP:  Overall clear, few rhonchi  ABD:  Soft  EXT:  1+ edema bilaterally, warm  Psych:  Appropriate  SKIN:  No rash    Medications:    Current Facility-Administered Medications:     DOBUTamine (DOBUTREX) 500 mg in 250 mL infusion (premix), 2 5 mcg/kg/min, Intravenous, Continuous, ROCK Franco, Last Rate: 10 13 mL/hr at 01/24/18 1002, 5 mcg/kg/min at 01/24/18 1002    heparin (porcine) subcutaneous injection 5,000 Units, 5,000 Units, Subcutaneous, Q8H [address], 5,000 Units at 01/24/18 0615 **AND** Platelet count, , , Once, ROCK Rodriguez    levothyroxine tablet 50 mcg, 50 mcg, Oral, Early Morning, Devonte Person Stephanie, ROCK, 50 mcg at 01/24/18 0614    pantoprazole (PROTONIX) EC tablet 40 mg, 40 mg, Oral, Daily Before Breakfast, ROCK Rodriguez, 40 mg at 01/24/18 0616    Laboratory Results:    Results from last 7 days  Lab Units 01/24/18  0536 01/23/18  1749 01/23/18  1230 01/23/18  1229   WBC Thousand/uL 15 49*  --  12 39*  --    HEMOGLOBIN g/dL 13 2  --  12 7  --    HEMATOCRIT % 43 5  --  40 5  --    PLATELETS Thousands/uL 89* 85* 108*  --    SODIUM mmol/L 139  --   --  138   POTASSIUM mmol/L 5 7*  --   --  5 5*   CHLORIDE mmol/L 97*  --   --  95*   CO2 mmol/L 18*  --   --  26   BUN mg/dL 63*  --   --  51*   CREATININE mg/dL 6 97*  --   --  6 26*   CALCIUM mg/dL 8 0*  --   --  8 0*   MAGNESIUM mg/dL  --   --   --  2 4   TOTAL PROTEIN g/dL  --   --   --  6 5   GLUCOSE RANDOM mg/dL 81  --   --  113

## 2018-01-24 NOTE — PLAN OF CARE

## 2018-01-24 NOTE — SOCIAL WORK
Consult received for hospice services - cm sent referral to  hospice, patient approved for ip unit  JOAQUIN set up transport with [facility] for 1700 tonight

## 2018-01-28 LAB
BACTERIA BLD CULT: NORMAL
BACTERIA BLD CULT: NORMAL

## 2018-03-07 NOTE — PROGRESS NOTES
"  Results/Data  Cardiac Device In Clinic [id number] 02:26PM Earnestine Gloss     Test Name Result Flag Reference   MISCELLANEOUS COMMENT (Report)     DEVICE INTERROGATED IN THE [place] OFFICE; BATTERY VOLTAGE ADEQUATE (LENKA - 9 2 YRS); AP = 90%,  = <1%; NO SIGNIFICANT HIGH RATE EPISODES; ALL LEAD PARAMETERS TEST WITHIN NORMAL LIMITS/STABLE; NO PROGRAMMING CHANGES MADE TO DEVICE PARAMETERS  WOUND CHECK: INCISION CLEAN AND DRY WITH EDGES APPROXIMATED; STERI STRIPS REMOVED; WOUND CARE AND RESTRICTIONS REVIEWED WITH PATIENT/FAMILY; PACEMAKER FUNCTIONING APPROPRIATELY  eb   Cardiac Electrophysiology Report      [id number]  pdf   DEVICE TYPE Pacemaker       Cardiac Electrophysiology Report [id number] 02:26PM Earnestine Gloss     Test Name Result Flag Reference   Cardiac Electrophysiology Report      [id number]  pdf     Signatures   Electronically signed by : Jamal Mendez, ; Nov 18 2016  1:17PM EST                       (Author)    Electronically signed by : Issac Merlin, DO; Nov 18 2016  5:13PM EST                          "

## 2018-03-07 NOTE — PROGRESS NOTES
"  Discussion/Summary  Normal device function      Results/Data  Cardiac Device Remote [id number] 05:21PM Cam Schwalbe     Test Name Result Flag Reference   MISCELLANEOUS COMMENT      [facility] TRANSMISSION: BATTERY VOLTAGE ADEQUATE (5 1-5 5 YRS)  AP-99%, <1%  ACAP CONFIRM @ HIGH OUTPUT MODE  ALL OTHER AVAILABLE LEAD PARAMETERS WITHIN NORMAL LIMITS  NO SIGNIFICANT HIGH RATE EPISODES  WILL BRING PT IN TO ASSESS RA LEAD  GV   Cardiac Electrophysiology Report      [id number]  pdf   DEVICE TYPE Pacemaker       Cardiac Electrophysiology Report [id number] 05:21PM Cam Schwalbe     Test Name Result Flag Reference   Cardiac Electrophysiology Report      [id number]  pdf     Signatures   Electronically signed by : Kyle Patel RN; Sep 14 2017  3:10PM EST                       (Author)    Electronically signed by : GUSTAVO Silverman ; Sep 14 2017  4:41PM EST                       (Author)    "

## 2018-03-07 NOTE — PROGRESS NOTES
"  Discussion/Summary  Normal device function      Results/Data  Cardiac Device Remote [id number] 04:18PM Michael Jj     Test Name Result Flag Reference   MISCELLANEOUS COMMENT (Report)     NONBILLABLE- [facility] TRANSMISSION ALERT: 1 NSVT EPISODE FOR 11 BEATS, AVG CL~345MS  EF-30% [facility] 8/12/16)  1 AF EPISODE LASTING 4 HRS ON 4/14/17  PT ON ASA 81MG & CARVEDILOL  AP-94%, <1%  ALL AVAILABLE LEAD PARAMETERS WITHIN NORMAL LIMITS  NORMAL DEVICE FUNCTION  GV   Cardiac Electrophysiology Report      [id number]  pdf   DEVICE TYPE Pacemaker       Cardiac Electrophysiology Report [id number] 04:18PM Michael Jj     Test Name Result Flag Reference   Cardiac Electrophysiology Report      [id number]  pdf     Signatures   Electronically signed by : Vibha Alvarado RN; Apr 25 2017 11:55AM EST                       (Author)    Electronically signed by : Ravinder Singh DO; May  6 2017  3:25PM EST                       (Author)    "

## 2018-03-07 NOTE — PROGRESS NOTES
"  Discussion/Summary  Normal device function     Episode of AT  Results/Data  Cardiac Device Remote [id number] 02:38PM JeremiahLife is Tech     Test Name Result Flag Reference   MISCELLANEOUS COMMENT      MERLIN TRANSMISSION: BATTERY VOLTAGE ADEQUATE (5 4 YRS)  AP-94%, <1%  ALL AVAILABLE LEAD PARAMETERS WITHIN NORMAL LIMITS  NO NEW SIGNIFICANT HIGH RATE EPISODES (AMS & VHR EPISODES PREVIOUSLY ADDRESSED)  NORMAL DEVICE FUNCTION  GV   Cardiac Electrophysiology Report      [id number]  pdf   DEVICE TYPE Pacemaker       Cardiac Electrophysiology Report [id number] 02:38PM Georgiahina Nortal AS     Test Name Result Flag Reference   Cardiac Electrophysiology Report      [id number]  pdf     Signatures   Electronically signed by : Cindy Valentine RN; Jun 8 2017  2:42PM EST                       (Author)    Electronically signed by : GUSTAVO Ventura ; Jun 8 2017  2:59PM EST                       (Author)    "

## 2018-03-07 NOTE — PROGRESS NOTES
"  Discussion/Summary  Normal device function      Results/Data  Cardiac Device In Clinic [id number] 05:29PM Luisana Vallejo     Test Name Result Flag Reference   MISCELLANEOUS COMMENT (Report)     *NONBILLABLE* DEVICE INTERROGATED IN THE [place] OFFICE TO TURN OFF A CAP CONFIRM WHICH HAS BEEN AT 5 V SINCE 4/24/17  BATTERY VOLTAGE ADEQUATE (5 YR)  AP 98%  < 1%  ALL LEAD PARAMETERS WITHIN NORMAL LIMITS  1 NEW AMS EPISODE WITH EGM SHOWING 10 S OF PACs WITH COMPETITIVE ATRIAL PACING  ATRIAL OUTPUT FIXED AT 2 5 V (CAPTURE = 1V/ 4MS)  NO OTHER PROGRAMMING CHANGES MADE TO DEVICE PARAMETERS  NORMAL DEVICE FUNCTION  RG   Cardiac Electrophysiology Report      [id number]  pdf   DEVICE TYPE Pacemaker       Cardiac Electrophysiology Report [id number] 05:29PM Luisana Vallejo     Test Name Result Flag Reference   Cardiac Electrophysiology Report      [id number]  pdf     Signatures   Electronically signed by : Mikhail Mart, ; Oct  3 2017  1:55PM EST                       (Author)    Electronically signed by : GUSTAVO Sellers ; Oct  3 2017  5:38PM EST                       (Author)    "

## 2018-03-07 NOTE — PROGRESS NOTES
"  Discussion/Summary  Normal device function      Results/Data  Cardiac Device Remote [id number] 04:03PM Molinda Curio     Test Name Result Flag Reference   MISCELLANEOUS COMMENT      MERLIN TRANSMISSION: BATTERY VOLTAGE ADEQUATE (9 7 YRS)  AP-95%, <1%  ALL AVAILABLE LEAD PARAMETERS WITHIN NORMAL LIMITS  NO SIGNIFICANT HIGH RATE EPISODES  NORMAL DEVICE FUNCTION  GV   Cardiac Electrophysiology Report      [id number]  pdf   DEVICE TYPE Pacemaker       Cardiac Electrophysiology Report [id number] 04:03PM Molinda CurCelladon     Test Name Result Flag Reference   Cardiac Electrophysiology Report      [id number]  pdf     Signatures   Electronically signed by : Maxine Perdue RN; Dec 13 2017  1:40PM EST                       (Author)    Electronically signed by : GUSTAVO Espana ; Dec 16 2017 11:25AM EST                       (Author)    "

## 2018-03-07 NOTE — PROGRESS NOTES
"  Results/Data  Cardiac Device In Clinic [id number] 07:58PM Deshawn Griffith     Test Name Result Flag Reference   MISCELLANEOUS COMMENT (Report)     DEVICE INTERROGATED IN THE [place] OFFICE  BATTERY VOLTAGE ADEQUATE (9 1 YRS)  AP 91%  <1%  ALL LEAD PARAMETERS WITHIN NORMAL LIMITS  2 AMS EPISODES UP TO 10 SEC = PAT WITH RATES   BPM  NO OTHER SIGNIFICANT HIGH RATE EPISODES  RA CAP CONFIRM PROG "ON, RV AUTO-CAPT PROG "ON", PROGRAMMED TO DEVICE CLINIC DIAGNOSTIC SETTINGS  PACEMAKER FUNCTIONING APPROPRIATELY  CP   Cardiac Electrophysiology Report      [id number]  pdf   DEVICE TYPE Pacemaker       Cardiac Electrophysiology Report [id number] 07:58PM Deshawn Griffith     Test Name Result Flag Reference   Cardiac Electrophysiology Report      [id number]  pdf     Signatures   Electronically signed by : Huey Real, ; Mar  7 2017  3:18PM EST                       (Author)    Electronically signed by : David Irvin DO; Mar  8 2017  5:03PM EST                          "

## 2018-04-02 DIAGNOSIS — N18.9 CHRONIC KIDNEY DISEASE: ICD-10-CM
